# Patient Record
Sex: MALE | Race: WHITE | NOT HISPANIC OR LATINO | Employment: FULL TIME | URBAN - METROPOLITAN AREA
[De-identification: names, ages, dates, MRNs, and addresses within clinical notes are randomized per-mention and may not be internally consistent; named-entity substitution may affect disease eponyms.]

---

## 2017-03-10 ENCOUNTER — TELEPHONE (OUTPATIENT)
Dept: FAMILY MEDICINE | Facility: CLINIC | Age: 47
End: 2017-03-10

## 2017-03-10 NOTE — TELEPHONE ENCOUNTER
Patient called asking to have order for 3 cpap masks signed by PCP.  We do not sign orders for CPAP machines/supplies.  Will forward the fax to Sleep Medicine to address.      Shraddha WILLIS    [facility]

## 2017-04-25 DIAGNOSIS — E78.5 HYPERLIPIDEMIA LDL GOAL <160: ICD-10-CM

## 2017-04-25 NOTE — TELEPHONE ENCOUNTER
simvastatin (ZOCOR) 20 MG     Last Written Prescription Date: 4/25/16  Last Fill Quantity: 90, # refills: 3  Last Office Visit with G, P or [facility] prescribing provider: 4/20/16       Lab Results   Component Value Date    CHOL 160 04/20/2016     Lab Results   Component Value Date    HDL 40 04/20/2016     Lab Results   Component Value Date    LDL 77 04/20/2016     Lab Results   Component Value Date    TRIG 216 04/20/2016     Lab Results   Component Value Date    CHOLHDLRATIO 3.1 03/09/2015

## 2017-04-26 RX ORDER — SIMVASTATIN 20 MG
TABLET ORAL
Qty: 30 TABLET | Refills: 1 | Status: SHIPPED | OUTPATIENT
Start: 2017-04-26 | End: 2020-10-19

## 2017-04-26 NOTE — TELEPHONE ENCOUNTER
Medication is being filled for 1 time refill only due to:  Patient needs to be seen because it has been more than one year since last visit.   Due for annual visit and fasting labs. Please call patient to schedule this.   Rosa Moon RN  Triage Nurse

## 2017-04-28 NOTE — TELEPHONE ENCOUNTER
Called patient.  Informed him of refill and that he needed to come in for a med check and labs.  He stated he will call back to schedule.    Shraddha WILLIS    [facility]

## 2020-02-23 ENCOUNTER — HEALTH MAINTENANCE LETTER (OUTPATIENT)
Age: 50
End: 2020-02-23

## 2020-06-24 ENCOUNTER — TELEPHONE (OUTPATIENT)
Dept: FAMILY MEDICINE | Facility: CLINIC | Age: 50
End: 2020-06-24

## 2020-06-25 NOTE — TELEPHONE ENCOUNTER
Reason for call:  Other   Patient called regarding (reason for call): call back  Additional comments: Patient's Spouse called, the family would like to know if they can return to get care with Venecia Carpenter.     Phone number to reach patient:  Home number on file [phone] (home)     Best Time:  Any     Can we leave a detailed message on this number?  YES     Travel screening: Not Applicable

## 2020-06-25 NOTE — TELEPHONE ENCOUNTER
LVM to Florina, mother to advise that Venecia NOEL is leaving [facility] as of 06/26/20.    Can offer Kyler MACKEY, Faby Allen NP or Padmini MACKEY.

## 2020-10-19 DIAGNOSIS — F41.9 ANXIETY: Primary | ICD-10-CM

## 2020-10-19 DIAGNOSIS — E78.2 MIXED HYPERLIPIDEMIA: ICD-10-CM

## 2020-10-19 NOTE — TELEPHONE ENCOUNTER
Patient is switching from [facility]     There are no more refill remaining. Only has two day left. Wondering if we could get a new script at least enough till his appointment.  Our Pharmacy can not give him any, since he hasn't filled with us.     Kenzie Santos, [facility]  [address] [place] [address] [phone]

## 2020-10-19 NOTE — TELEPHONE ENCOUNTER
simvastatin (ZOCOR) 20 MG tablet   lexapro   Last Written Prescription Date:  4/26/17  Last Fill Quantity: 30,   # refills: 1  Last Office Visit: 4/25/17 Patient is transferring care from [facility] and is asking for a fill to get him to his scheduled visit  Future Office visit:    Next 5 appointments (look out 90 days)    Nov 02, 2020  3:40 PM  PHYSICAL with Padmini Thompson PA-C  [facility] ([address] [address] [phone]           Routing refill request to provider for review/approval because:  Medication is historical    Ita Grajeda RN on 10/19/2020 at 1:38 PM

## 2020-10-20 ENCOUNTER — TELEPHONE (OUTPATIENT)
Dept: FAMILY MEDICINE | Facility: CLINIC | Age: 50
End: 2020-10-20

## 2020-10-20 RX ORDER — SIMVASTATIN 20 MG
TABLET ORAL
Qty: 30 TABLET | Refills: 0 | Status: SHIPPED | OUTPATIENT
Start: 2020-10-20 | End: 2020-11-04

## 2020-10-20 RX ORDER — ESCITALOPRAM OXALATE 10 MG/1
10 TABLET ORAL DAILY
Qty: 30 TABLET | Refills: 0 | Status: SHIPPED | OUTPATIENT
Start: 2020-10-20 | End: 2020-11-02

## 2020-10-20 NOTE — TELEPHONE ENCOUNTER
Reason for call:  Medication   If this is a refill request, has the caller requested the refill from the pharmacy already? No  Will the patient be using a [facility]? Yes  Name of [facility] the [facility]    Name of the medication requested: simvastatin (ZOCOR)    Other request: Pt also requested RX for citalopram  Not listed under current med    Phone number to reach patient:  Cell number on file:    Telephone Information:   Mobile [phone]       Best Time:  Any time    Can we leave a detailed message on this number?  YES    Travel screening: Not Applicable     Naomi Guardado on 10/20/2020 at 1:39 PM

## 2020-10-20 NOTE — TELEPHONE ENCOUNTER
Pt last seen here in 2016.  Would need to go to previous provider for refills until seen here.  Has an appt scheduled 11/2/2020.      Called the pt to advise.

## 2020-11-02 ENCOUNTER — OFFICE VISIT (OUTPATIENT)
Dept: FAMILY MEDICINE | Facility: CLINIC | Age: 50
End: 2020-11-02
Payer: COMMERCIAL

## 2020-11-02 DIAGNOSIS — N50.811 RIGHT TESTICULAR PAIN: ICD-10-CM

## 2020-11-02 DIAGNOSIS — Z00.00 ROUTINE GENERAL MEDICAL EXAMINATION AT A HEALTH CARE FACILITY: Primary | ICD-10-CM

## 2020-11-02 DIAGNOSIS — R03.0 WHITE COAT SYNDROME WITHOUT DIAGNOSIS OF HYPERTENSION: ICD-10-CM

## 2020-11-02 DIAGNOSIS — E78.5 HYPERLIPIDEMIA LDL GOAL <160: ICD-10-CM

## 2020-11-02 DIAGNOSIS — G47.33 OSA ON CPAP: ICD-10-CM

## 2020-11-02 DIAGNOSIS — E66.01 MORBID OBESITY (H): ICD-10-CM

## 2020-11-02 DIAGNOSIS — F41.9 ANXIETY: ICD-10-CM

## 2020-11-02 LAB — HBA1C MFR BLD: 5.4 % (ref 0–5.6)

## 2020-11-02 PROCEDURE — 87389 HIV-1 AG W/HIV-1&-2 AB AG IA: CPT | Performed by: PHYSICIAN ASSISTANT

## 2020-11-02 PROCEDURE — 80061 LIPID PANEL: CPT | Performed by: PHYSICIAN ASSISTANT

## 2020-11-02 PROCEDURE — 86803 HEPATITIS C AB TEST: CPT | Performed by: PHYSICIAN ASSISTANT

## 2020-11-02 PROCEDURE — 36415 COLL VENOUS BLD VENIPUNCTURE: CPT | Performed by: PHYSICIAN ASSISTANT

## 2020-11-02 PROCEDURE — 99386 PREV VISIT NEW AGE 40-64: CPT | Performed by: PHYSICIAN ASSISTANT

## 2020-11-02 PROCEDURE — 84443 ASSAY THYROID STIM HORMONE: CPT | Performed by: PHYSICIAN ASSISTANT

## 2020-11-02 PROCEDURE — 80053 COMPREHEN METABOLIC PANEL: CPT | Performed by: PHYSICIAN ASSISTANT

## 2020-11-02 PROCEDURE — 83036 HEMOGLOBIN GLYCOSYLATED A1C: CPT | Performed by: PHYSICIAN ASSISTANT

## 2020-11-02 PROCEDURE — 99213 OFFICE O/P EST LOW 20 MIN: CPT | Mod: 25 | Performed by: PHYSICIAN ASSISTANT

## 2020-11-02 PROCEDURE — 96127 BRIEF EMOTIONAL/BEHAV ASSMT: CPT | Performed by: PHYSICIAN ASSISTANT

## 2020-11-02 RX ORDER — ESCITALOPRAM OXALATE 10 MG/1
10 TABLET ORAL DAILY
Qty: 90 TABLET | Refills: 3 | Status: SHIPPED | OUTPATIENT
Start: 2020-11-02 | End: 2021-11-16

## 2020-11-02 ASSESSMENT — ENCOUNTER SYMPTOMS
NAUSEA: 0
EYE PAIN: 0
COUGH: 0
ABDOMINAL PAIN: 0
WEAKNESS: 0
DYSURIA: 0
SHORTNESS OF BREATH: 0
PALPITATIONS: 0
CONSTIPATION: 0
FEVER: 0
ARTHRALGIAS: 0
FREQUENCY: 0
MYALGIAS: 0
DIARRHEA: 0
SORE THROAT: 0
JOINT SWELLING: 0
CHILLS: 0
HEADACHES: 0
NERVOUS/ANXIOUS: 0
PARESTHESIAS: 0
HEMATOCHEZIA: 0
DIZZINESS: 0
HEARTBURN: 0
HEMATURIA: 0

## 2020-11-02 ASSESSMENT — ANXIETY QUESTIONNAIRES
7. FEELING AFRAID AS IF SOMETHING AWFUL MIGHT HAPPEN: SEVERAL DAYS
IF YOU CHECKED OFF ANY PROBLEMS ON THIS QUESTIONNAIRE, HOW DIFFICULT HAVE THESE PROBLEMS MADE IT FOR YOU TO DO YOUR WORK, TAKE CARE OF THINGS AT HOME, OR GET ALONG WITH OTHER PEOPLE: NOT DIFFICULT AT ALL
GAD7 TOTAL SCORE: 6
2. NOT BEING ABLE TO STOP OR CONTROL WORRYING: SEVERAL DAYS
5. BEING SO RESTLESS THAT IT IS HARD TO SIT STILL: SEVERAL DAYS
1. FEELING NERVOUS, ANXIOUS, OR ON EDGE: SEVERAL DAYS
3. WORRYING TOO MUCH ABOUT DIFFERENT THINGS: SEVERAL DAYS
6. BECOMING EASILY ANNOYED OR IRRITABLE: NOT AT ALL

## 2020-11-02 ASSESSMENT — PATIENT HEALTH QUESTIONNAIRE - PHQ9
5. POOR APPETITE OR OVEREATING: SEVERAL DAYS
SUM OF ALL RESPONSES TO PHQ QUESTIONS 1-9: 0

## 2020-11-02 ASSESSMENT — MIFFLIN-ST. JEOR: SCORE: 1925.75

## 2020-11-02 NOTE — PROGRESS NOTES
SUBJECTIVE:   CC: Neptali Ospina is an 50 year old male who presents for preventative health visit.     Patient has been advised of split billing requirements and indicates understanding: Yes  Healthy Habits:     Getting at least 3 servings of Calcium per day:  Yes    Bi-annual eye exam:  Yes    Dental care twice a year:  Yes    Sleep apnea or symptoms of sleep apnea:  Sleep apnea    Diet:  Regular (no restrictions)    Frequency of exercise:  2-3 days/week    Duration of exercise:  45-60 minutes    Taking medications regularly:  Yes    Medication side effects:  None    PHQ-2 Total Score: 0    Additional concerns today:  Yes    History of elevated blood pressure readings without diagnosis of HTN. He feels significant anxiety coming to clinic and blood pressures are usually elevated. He did see cardiology in 2018 who felt he had white coat HTN due to anxiety. He monitors his BP at home and readings are variable, sometimes 130s/80s but sometimes higher. He feels that checking his blood pressure at home causes more anxiety and then blood pressure rises which increases anxiety more. BP last night was 135/88. BP was 146/104 at home today - he has been anxious all day anticipating this clinic visit this afternoon. No chest pain, shortness of breath, swelling in the extremities, palpitations, dizziness, or headaches. No blurred vision. He would like to try a 24 hour ambulatory blood pressure monitor to see if he really has high blood pressure or not. He is open to taking medication if needed but wants to know if he actually has high blood pressure or if it is just white coat hypertension or related to anxiety.    BP Readings from Last 4 Encounters:   11/02/20 146/88  09/24/19 150/92 (pulled from [facility])  10/31/18 164/92 (pulled from [facility])  09/06/18 132/92 (pulled from [facility])  03/13/18 188/96 (pulled from [facility])    History of anxiety. Taking Lexapro 10 mg daily with good control. No side  effects. No suicidal or homicidal ideation. Would like to continue medication without change.    PHQ 11/2/2020   PHQ-9 Total Score 0   Q9: Thoughts of better off dead/self-harm past 2 weeks Not at all     WALDO-7 SCORE 6/4/2013 6/4/2013 11/2/2020   Total Score 10 10 -   Total Score - - 6     Today's PHQ-2 Score:   PHQ-2 ( 1999 [employer]) 11/2/2020   Q1: Little interest or pleasure in doing things 0   Q2: Feeling down, depressed or hopeless 0   PHQ-2 Score 0   Q1: Little interest or pleasure in doing things Not at all   Q2: Feeling down, depressed or hopeless Not at all   PHQ-2 Score 0     Reports having occasional mild right testicular pain after working out. No bulges or lumps. No abdominal pain, nausea, vomiting, bowel or urinary changes. His wife is concerned maybe he has a hernia but he is not concerned about this and does not want work up at this time. States he worked out today and has no pain or discomfort.    Abuse: Current or Past(Physical, Sexual or Emotional)- No  Do you feel safe in your environment? Yes        Social History     Tobacco Use     Smoking status: Never Smoker     Smokeless tobacco: Never Used   Substance Use Topics     Alcohol use: Yes     Alcohol/week: 0.0 standard drinks     Comment: very occ.         Alcohol Use 11/2/2020   Prescreen: >3 drinks/day or >7 drinks/week? No   Prescreen: >3 drinks/day or >7 drinks/week? -       Last PSA: No results found for: PSA    Reviewed orders with patient. Reviewed health maintenance and updated orders accordingly - Yes  Lab work is in process  Labs reviewed in EPIC  BP Readings from Last 3 Encounters:   11/02/20 (!) 146/88   04/20/16 162/74   03/09/15 134/86    Wt Readings from Last 3 Encounters:   11/02/20 108.2 kg (238 lb 9.6 oz)   04/20/16 95.3 kg (210 lb 3.2 oz)   03/09/15 93.4 kg (206 lb)                  Patient Active Problem List   Diagnosis     Obesity     DANIELLE on CPAP     Vitamin D deficiency     Left thyroid nodule     Anxiety     White coat  syndrome without diagnosis of hypertension     CARDIOVASCULAR SCREENING; LDL GOAL LESS THAN 160     Morbid obesity (H)     Past Surgical History:   Procedure Laterality Date     PE TUBES       TONSILLECTOMY & ADENOIDECTOMY       VASECTOMY         Social History     Tobacco Use     Smoking status: Never Smoker     Smokeless tobacco: Never Used   Substance Use Topics     Alcohol use: Yes     Alcohol/week: 0.0 standard drinks     Comment: very occ.     Family History   Problem Relation Age of Onset     Circulatory Father         sleep apnea,   in sleep 58     C.A.D. Mother      Cancer Mother         brain tumor     Gynecology Sister         x2         Current Outpatient Medications   Medication Sig Dispense Refill     aspirin 81 MG tablet Take 1 tablet by mouth daily. 90 tablet 3     escitalopram (LEXAPRO) 10 MG tablet Take 1 tablet (10 mg) by mouth daily 90 tablet 3     fish oil-omega-3 fatty acids (OMEGA 3) 1000 MG capsule Take 1 capsule by mouth 2 times daily. Lavazo fish oil 180 capsule 1     fluticasone (FLONASE) 50 MCG/ACT nasal spray Spray 1-2 sprays into both nostrils daily 1 Package 1     Multiple Vitamin (MULTI-VITAMIN) per tablet Take 1 tablet by mouth daily. 90 tablet 3     simvastatin (ZOCOR) 20 MG tablet TAKE ONE TABLET BY MOUTH EVERY NIGHT AT BEDTIME 30 tablet 0     Allergies   Allergen Reactions     Nkda [No Known Drug Allergies]      Recent Labs   Lab Test 20  1628 16  0821 03/09/15  0836 13  0946 13  1005   A1C 5.4 5.5  --  5.3  --    LDL  --  77 66  --  106   HDL  --  40 42  --  36*   TRIG  --  216* 120  --  141   ALT  --  57  --   --   --    CR  --  0.92  --   --   --    GFRESTIMATED  --  89  --   --   --    GFRESTBLACK  --  >90   GFR Calc    --   --   --    POTASSIUM  --  3.5  --   --   --    TSH  --  2.34 2.54  --   --         Reviewed and updated as needed this visit by clinical staff  Tobacco  Allergies  Meds  Problems  Med Hx  Surg Hx  Fam Hx   "Soc Hx          Reviewed and updated as needed this visit by Provider  Tobacco  Allergies  Meds  Problems  Med Hx  Surg Hx  Fam Hx             Review of Systems   Constitutional: Negative for chills and fever.   HENT: Negative for congestion, ear pain, hearing loss and sore throat.    Eyes: Negative for pain and visual disturbance.   Respiratory: Negative for cough and shortness of breath.    Cardiovascular: Negative for chest pain, palpitations and peripheral edema.   Gastrointestinal: Negative for abdominal pain, constipation, diarrhea, heartburn, hematochezia, nausea and vomiting.   Genitourinary: Negative for discharge, dysuria, frequency, genital sores, hematuria, impotence and urgency.   Musculoskeletal: Negative for arthralgias, joint swelling and myalgias.   Skin: Negative for rash.   Neurological: Negative for dizziness, weakness, headaches and paresthesias.   Psychiatric/Behavioral: Negative for mood changes. The patient is not nervous/anxious.        OBJECTIVE:   BP (!) 146/88   Pulse 96   Temp 98  F (36.7  C) (Oral)   Resp 16   Ht 1.734 m (5' 8.25\")   Wt 108.2 kg (238 lb 9.6 oz)   SpO2 99%   BMI 36.01 kg/m      Physical Exam  GENERAL: healthy, alert and no distress  EYES: Eyes grossly normal to inspection, PERRL and conjunctivae and sclerae normal  HENT: ear canals and TM's normal, nose and mouth without ulcers or lesions  NECK: no adenopathy, no asymmetry, masses, or scars and thyroid normal to palpation  RESP: lungs clear to auscultation - no rales, rhonchi or wheezes  CV: regular rate and rhythm, normal S1 S2, no S3 or S4, no murmur, click or rub, no peripheral edema and peripheral pulses strong  ABDOMEN: soft, nontender, no hepatosplenomegaly, no masses and bowel sounds normal   (male): normal male genitalia without lesions or urethral discharge, no hernia  MS: no gross musculoskeletal defects noted, no edema  SKIN: no suspicious lesions or rashes. Several small skin tags under right " axilla. Seborrheic keratosis on abdomen and back.  NEURO: Normal strength and tone, mentation intact and speech normal  PSYCH: mentation appears normal, affect normal/bright, anxious initially but calmed down towards end of visit    Diagnostic Test Results:  Labs reviewed in Epic    ASSESSMENT/PLAN:   1. Routine general medical examination at a health care facility  Reviewed personal and family history. Reviewed age appropriate screenings. Recommended any needed vaccinations. Continue to focus on well balanced diet and exercise.   - Comprehensive metabolic panel (BMP + Alb, Alk Phos, ALT, AST, Total. Bili, TP)  - Lipid panel reflex to direct LDL Fasting  - Hemoglobin A1c  - HIV Antigen Antibody Combo  - **Hepatitis C Screen Reflex to RNA FUTURE anytime  - TSH with Free T4 Reflex    2. Hyperlipidemia LDL goal <160  Chronic issue. Taking Simvastatin 20 mg without side effects. Will check labs, refill meds.  - Lipid panel reflex to direct LDL Fasting    3. DANIELLE on CPAP  Chronic issue, doing well with CPAP.    4. White coat syndrome without diagnosis of hypertension  History of elevated blood pressure readings without diagnosis of HTN. He did see cardiology in 2018 who felt he had white coat HTN due to anxiety (significant anxiety coming to clinic). He monitors his BP at home and readings are variable, sometimes 130s/80s but sometimes higher - seems the more he checks his BP the more anxious he gets and then his BP gets higher. BP in clinic today checked several times throughout the visit and did come down quite a bit on recheck after he was feeling less anxious, though still elevated at 146/88. He would like to try a 24 hour ambulatory blood pressure monitor to see if he really has high blood pressure or not. He is open to taking medication if needed but wants to know if he actually has high blood pressure or if it is just white coat hypertension or related to anxiety.  - 24 Hour Blood Pressure Monitor - Adult;  "Future    5. Morbid obesity (H)  Chronic issue. Discussed healthy diet/exercise, weight loss.    6. Anxiety  Chronic issue, stable. PHQ-9/WALDO-7 updated. Tolerating medication without side effects. Continue present management.  - escitalopram (LEXAPRO) 10 MG tablet; Take 1 tablet (10 mg) by mouth daily  Dispense: 90 tablet; Refill: 3    7. Right testicular pain  Intermittent mild discomfort after working out. No bulging or lumps. No current pain. No evidence of hernia on exam. Discussed options for work up including testicular US but he does not want to pursue work up at this time. Recommend monitoring and follow-up if persistent or worsening.    Patient has been advised of split billing requirements and indicates understanding: Yes  COUNSELING:   Reviewed preventive health counseling, as reflected in patient instructions    Estimated body mass index is 36.01 kg/m  as calculated from the following:    Height as of this encounter: 1.734 m (5' 8.25\").    Weight as of this encounter: 108.2 kg (238 lb 9.6 oz).     Weight management plan: Discussed healthy diet and exercise guidelines    He reports that he has never smoked. He has never used smokeless tobacco.      Counseling Resources:  ATP IV Guidelines  Pooled Cohorts Equation Calculator  FRAX Risk Assessment  ICSI Preventive Guidelines  Dietary Guidelines for Americans, 2010  USDA's MyPlate  ASA Prophylaxis  Lung CA Screening    [facility]  "

## 2020-11-03 VITALS
BODY MASS INDEX: 36.16 KG/M2 | TEMPERATURE: 98 F | WEIGHT: 238.6 LBS | SYSTOLIC BLOOD PRESSURE: 146 MMHG | OXYGEN SATURATION: 99 % | RESPIRATION RATE: 16 BRPM | DIASTOLIC BLOOD PRESSURE: 88 MMHG | HEIGHT: 68 IN | HEART RATE: 96 BPM

## 2020-11-03 LAB
ALBUMIN SERPL-MCNC: 4.2 G/DL (ref 3.4–5)
ALP SERPL-CCNC: 63 U/L (ref 40–150)
ALT SERPL W P-5'-P-CCNC: 84 U/L (ref 0–70)
ANION GAP SERPL CALCULATED.3IONS-SCNC: 4 MMOL/L (ref 3–14)
AST SERPL W P-5'-P-CCNC: 45 U/L (ref 0–45)
BILIRUB SERPL-MCNC: 0.6 MG/DL (ref 0.2–1.3)
BUN SERPL-MCNC: 13 MG/DL (ref 7–30)
CALCIUM SERPL-MCNC: 9.3 MG/DL (ref 8.5–10.1)
CHLORIDE SERPL-SCNC: 103 MMOL/L (ref 94–109)
CHOLEST SERPL-MCNC: 180 MG/DL
CO2 SERPL-SCNC: 31 MMOL/L (ref 20–32)
CREAT SERPL-MCNC: 0.87 MG/DL (ref 0.66–1.25)
GFR SERPL CREATININE-BSD FRML MDRD: >90 ML/MIN/{1.73_M2}
GLUCOSE SERPL-MCNC: 99 MG/DL (ref 70–99)
HDLC SERPL-MCNC: 37 MG/DL
LDLC SERPL CALC-MCNC: 96 MG/DL
NONHDLC SERPL-MCNC: 143 MG/DL
POTASSIUM SERPL-SCNC: 4.1 MMOL/L (ref 3.4–5.3)
PROT SERPL-MCNC: 7.9 G/DL (ref 6.8–8.8)
SODIUM SERPL-SCNC: 138 MMOL/L (ref 133–144)
TRIGL SERPL-MCNC: 235 MG/DL
TSH SERPL DL<=0.005 MIU/L-ACNC: 2.7 MU/L (ref 0.4–4)

## 2020-11-03 ASSESSMENT — ANXIETY QUESTIONNAIRES: GAD7 TOTAL SCORE: 6

## 2020-11-03 ASSESSMENT — ENCOUNTER SYMPTOMS: VOMITING: 0

## 2020-11-04 DIAGNOSIS — E78.2 MIXED HYPERLIPIDEMIA: ICD-10-CM

## 2020-11-04 DIAGNOSIS — R74.01 ELEVATED ALT MEASUREMENT: Primary | ICD-10-CM

## 2020-11-04 LAB
HCV AB SERPL QL IA: NONREACTIVE
HIV 1+2 AB+HIV1 P24 AG SERPL QL IA: NONREACTIVE

## 2020-11-04 RX ORDER — SIMVASTATIN 20 MG
TABLET ORAL
Qty: 90 TABLET | Refills: 3 | Status: SHIPPED | OUTPATIENT
Start: 2020-11-04 | End: 2021-11-16

## 2021-03-12 ENCOUNTER — OFFICE VISIT (OUTPATIENT)
Dept: PODIATRY | Facility: CLINIC | Age: 51
End: 2021-03-12
Payer: COMMERCIAL

## 2021-03-12 VITALS
WEIGHT: 240 LBS | DIASTOLIC BLOOD PRESSURE: 78 MMHG | HEIGHT: 68 IN | BODY MASS INDEX: 36.37 KG/M2 | SYSTOLIC BLOOD PRESSURE: 128 MMHG

## 2021-03-12 DIAGNOSIS — L60.0 INGROWN NAIL OF GREAT TOE OF LEFT FOOT: ICD-10-CM

## 2021-03-12 DIAGNOSIS — M79.672 LEFT FOOT PAIN: Primary | ICD-10-CM

## 2021-03-12 PROCEDURE — 99202 OFFICE O/P NEW SF 15 MIN: CPT | Mod: 25 | Performed by: PODIATRIST

## 2021-03-12 PROCEDURE — 11730 AVULSION NAIL PLATE SIMPLE 1: CPT | Mod: TA | Performed by: PODIATRIST

## 2021-03-12 ASSESSMENT — MIFFLIN-ST. JEOR: SCORE: 1932.1

## 2021-03-12 ASSESSMENT — PAIN SCALES - GENERAL: PAINLEVEL: MODERATE PAIN (5)

## 2021-03-12 NOTE — LETTER
3/12/2021         RE: Neptali Ospina  [address] [address]        Dear Colleague,    Thank you for referring your patient, Neptali Ospina, to the [facility]. Please see a copy of my visit note below.    PATIENT HISTORY:  Neptali Ospina is a 50 year old male who presents to clinic for pain to the left great toenail.  Notes is been going on for about a month.  Pain can be 5 out of 10 at its worst.  Worse with the sheets on it or with pressure.  Denies specific injury.  He thinks he cut his nail wrong as he is never had an ingrown nail before.  Wondering what can be done for it.    Review of Systems:  Patient denies fever, chills, rash, wound, stiffness, limping, numbness, weakness, heart burn, blood in stool, chest pain with activity, calf pain when walking, shortness of breath with activity, chronic cough, easy bleeding/bruising, swelling of ankles, excessive thirst, fatigue, depression, anxiety.       PAST MEDICAL HISTORY:   Past Medical History:   Diagnosis Date     Anxiety      Elevated fasting glucose      High triglycerides      DARLING (nonalcoholic steatohepatitis)     12/10      DANIELLE on CPAP      Strabismus      Thyroid nodule 2/2012    biopsy thyroglossal ductal cyst.  see Endo     Vitamin D deficiency         PAST SURGICAL HISTORY:   Past Surgical History:   Procedure Laterality Date     PE TUBES       TONSILLECTOMY & ADENOIDECTOMY       VASECTOMY          MEDICATIONS:   Current Outpatient Medications:      aspirin 81 MG tablet, Take 1 tablet by mouth daily., Disp: 90 tablet, Rfl: 3     escitalopram (LEXAPRO) 10 MG tablet, Take 1 tablet (10 mg) by mouth daily, Disp: 90 tablet, Rfl: 3     fish oil-omega-3 fatty acids (OMEGA 3) 1000 MG capsule, Take 1 capsule by mouth 2 times daily. Lavazo fish oil, Disp: 180 capsule, Rfl: 1     fluticasone (FLONASE) 50 MCG/ACT nasal spray, Spray 1-2 sprays into both nostrils daily, Disp: 1 Package, Rfl: 1      Multiple Vitamin (MULTI-VITAMIN) per tablet, Take 1 tablet by mouth daily., Disp: 90 tablet, Rfl: 3     simvastatin (ZOCOR) 20 MG tablet, TAKE ONE TABLET BY MOUTH EVERY NIGHT AT BEDTIME, Disp: 90 tablet, Rfl: 3     ALLERGIES:    Allergies   Allergen Reactions     Nkda [No Known Drug Allergies]         SOCIAL HISTORY:   Social History     Socioeconomic History     Marital status:      Spouse name: Not on file     Number of children: 4     Years of education: Not on file     Highest education level: Not on file   Occupational History     Not on file   Social Needs     Financial resource strain: Not on file     Food insecurity     Worry: Not on file     Inability: Not on file     Transportation needs     Medical: Not on file     Non-medical: Not on file   Tobacco Use     Smoking status: Never Smoker     Smokeless tobacco: Never Used   Substance and Sexual Activity     Alcohol use: Yes     Alcohol/week: 0.0 standard drinks     Comment: very occ.     Drug use: No     Sexual activity: Yes     Partners: Female     Birth control/protection: Surgical   Lifestyle     Physical activity     Days per week: Not on file     Minutes per session: Not on file     Stress: Not on file   Relationships     Social connections     Talks on phone: Not on file     Gets together: Not on file     Attends Restoration service: Not on file     Active member of club or organization: Not on file     Attends meetings of clubs or organizations: Not on file     Relationship status: Not on file     Intimate partner violence     Fear of current or ex partner: Not on file     Emotionally abused: Not on file     Physically abused: Not on file     Forced sexual activity: Not on file   Other Topics Concern     Parent/sibling w/ CABG, MI or angioplasty before 65F 55M? Yes   Social History Narrative     Not on file        FAMILY HISTORY:   Family History   Problem Relation Age of Onset     Circulatory Father         sleep apnea,   in sleep 58      "CONSUELOABRANDT Mother      Cancer Mother         brain tumor     Gynecology Sister         x2        EXAM:Vitals: /78   Ht 1.734 m (5' 8.25\")   Wt 108.9 kg (240 lb)   BMI 36.23 kg/m    BMI= Body mass index is 36.23 kg/m .    General appearance: Patient is alert and fully cooperative with history & exam.  No sign of distress is noted during the visit.     Psychiatric: Affect is pleasant & appropriate.  Patient appears motivated to improve health.     Respiratory: Breathing is regular & unlabored while sitting.     HEENT: Hearing is intact to spoken word.  Speech is clear.  No gross evidence of visual impairment that would impact ambulation.     Dermatologic: lateral border of the left great toenail is incurvated.  Localized redness and pain on palpation     Vascular: DP & PT pulses are intact & regular bilaterally.  No significant edema or varicosities noted.  CFT and skin temperature is normal to both lower extremities.     Neurologic: Lower extremity sensation is intact to light touch.  No evidence of weakness or contracture in the lower extremities.  No evidence of neuropathy.     Musculoskeletal: Patient is ambulatory without assistive device or brace.  No gross ankle deformity noted.  No foot or ankle joint effusion is noted.     ASSESSMENT:    Left foot pain  Ingrown nail of great toe of left foot     Medical Decision Making/Plan:  Reviewed patient's chart in [facility]. The potential causes and nature of an ingrown toenail were discussed with the patient.  We reviewed the natural history/prognosis of the condition and potential risks if no treatment is provided.      Treatment options discussed included conservative management (oral antibiotics, soaking of foot, adequate width shoes)  as well as surgical management (partial or total nail removal).  The pros and cons of both forms of treatment were reviewed.      After thorough discussion and answering all questions, the patient elected to have the border removed.  " He will soak the foot twice a day for 2 weeks and apply antibiotic cream and a Band-Aid.  All questions were answered to patient satisfaction he will call further questions or concerns.     Procedure:  After verbal consent, the left big toe was anesthetized with 5cc's of 1% lidocaine plain. A tourniquet was applied to the toe. The medial border was then raised from the nail bed and then cut the length of the nail.  The offending nail border was then removed.  T Bacitracin was applied to the nail bed.  The tourniquet was removed.  Bandage was applied to the toe.  The patient tolerated the procedure and anesthesia well.    .    Patient risk factor: Patient is at low risk for infection.        Thalia Prado DPM, Podiatry/Foot and Ankle Surgery    Recommended to Neptali Ospina to follow up with Primary Care provider regarding elevated blood pressure.          Again, thank you for allowing me to participate in the care of your patient.        Sincerely,        Thalia Prado DPM, Podiatry/Foot and Ankle Surgery

## 2021-03-12 NOTE — PATIENT INSTRUCTIONS
Thank you for choosing [facility] Podiatry / Foot & Ankle Surgery!    DR. TAVARES'S CLINIC:  [facility] SCHEDULE SURGERY: [phone]   [address] BILLING QUESTIONS: [phone]   [place] [address] APPOINTMENTS: [phone]   PH: [phone] CONSUMER PRICE LINE:[phone]   FAX: [phone]      Follow up: as needed  Diagnosis: Left ingrown great toe nail     INGROWN TOENAILS  When a toenail is ingrown, it is curved and grows into the skin, usually at the nail borders (the sides of the nail). This  digging in  of the nail irritates the skin, often creating pain, redness, swelling, and warmth in the toe.  If an ingrown nail causes a break in the skin, bacteria may enter and cause an infection in the area, which is often marked by drainage and a foul odor. However, even if the toe isn t painful, red, swollen, or warm, a nail that curves downward into the skin can progress to an infection.  CAUSES:  Heredity: In many people, the tendency for ingrown toenails is inherited.   Trauma: Sometimes an ingrown toenail is the result of trauma, such as stubbing your toe, having an object fall on your toe, or engaging in activities that involve repeated pressure on the toes, such as kicking or running.   Improper Trimming:  The most common cause of ingrown toenails is cutting your nails too short. This encourages the skin next to the nail to fold over the nail.   Improperly Sized Footwear: Ingrown toenails can result from wearing socks and shoes that are tight or short.   Nail Conditions: Ingrown toenails can be caused by nail problems, such as fungal infections or losing a nail due to trauma.   TREATMENT: Sometimes initial treatment for ingrown toenails can be safely performed at home. However, home treatment is strongly discouraged if an infection is suspected, or for those who have medical conditions that put feet at high risk, such as diabetes, nerve damage in the foot, or poor  circulation.  Home care: If you don t have an infection or any of the above medical conditions, you can soak your foot in room-temperature water (adding Epsom s salt may be recommended by your doctor), and gently massage the side of the nail fold to help reduce the inflammation.  Avoid attempting  bathroom surgery.  Repeated cutting of the nail can cause the condition to worsen over time. If your symptoms fail to improve, it s time to see a foot and ankle surgeon.  Physician care: After examining the toe, the foot and ankle surgeon will select the treatment best suited for you. If an infection is present, an oral antibiotic may be prescribed.  Sometimes a minor surgical procedure, often performed in the office, will ease the pain and remove the offending nail. After applying a local anesthetic, the doctor removes part of the nail s side border. Some nails may become ingrown again, requiring removal of the nail root.  Following the nail procedure, a light bandage will be applied. Most people experience very little pain after surgery and may resume normal activity the next day. If your surgeon has prescribed an oral antibiotic, be sure to take all the medication, even if your symptoms have improved.  PREVENTION:  Proper Trimming: Cut toenails in a fairly straight line, and don t cut them too short. You should be able to get your fingernail under the sides and end of the nail.   Well-fitting Footwear: Don t wear shoes that are short or tight in the toe area. Avoid shoes that are loose, because they too cause pressure on the toes, especially when running or walking briskly.     INGROWN TOENAIL REMOVAL AFTERCARE     Go directly home and elevate the affected foot on one or two pillows for the remainder of the day/evening if possible. Your toe may stay numb anywhere from 2-8 hours.     Take Tylenol, ibuprofen or another anti-inflammatory as needed for pain.     Take antibiotic if that has been prescribed. Finish the entire  prescribed antibiotic even if your symptoms have improved.     The evening of the procedure, soak/wash the affected area in warm water (you may add Epsom salt) for 5 to 10 minutes. Do this twice a day for 2-4 weeks (6-8 weeks if you had phenol) (you may count showering/bathing as one soak).  After soaks, pat the area dry and then allow to airdry for a few minutes. Apply antibiotic ointment to the area and cover with 2 X 2 gauze and paper tape or band-aid.    You may pursue everyday activities as tolerated with either an open toe shoe or cut-out shoe as needed or you may wear regular shoes if no pain is noted.    Watch for any signs and symptoms of infection such as: redness, red streaks going up the foot/leg, swelling, pus or foul odor. Those that have had the phenol procedure, the toe will drain longer and will look like it is infected because it is a chemical burn.     Please call with questions.          Neptali to follow up with Primary Care provider regarding elevated blood pressure. (if equal or above 140/90)

## 2021-03-12 NOTE — PROGRESS NOTES
PATIENT HISTORY:  Neptali Ospina is a 50 year old male who presents to clinic for pain to the left great toenail.  Notes is been going on for about a month.  Pain can be 5 out of 10 at its worst.  Worse with the sheets on it or with pressure.  Denies specific injury.  He thinks he cut his nail wrong as he is never had an ingrown nail before.  Wondering what can be done for it.    Review of Systems:  Patient denies fever, chills, rash, wound, stiffness, limping, numbness, weakness, heart burn, blood in stool, chest pain with activity, calf pain when walking, shortness of breath with activity, chronic cough, easy bleeding/bruising, swelling of ankles, excessive thirst, fatigue, depression, anxiety.       PAST MEDICAL HISTORY:   Past Medical History:   Diagnosis Date     Anxiety      Elevated fasting glucose      High triglycerides      DARLING (nonalcoholic steatohepatitis)     12/10      DANIELLE on CPAP      Strabismus      Thyroid nodule 2/2012    biopsy thyroglossal ductal cyst.  see Endo     Vitamin D deficiency         PAST SURGICAL HISTORY:   Past Surgical History:   Procedure Laterality Date     PE TUBES       TONSILLECTOMY & ADENOIDECTOMY       VASECTOMY          MEDICATIONS:   Current Outpatient Medications:      aspirin 81 MG tablet, Take 1 tablet by mouth daily., Disp: 90 tablet, Rfl: 3     escitalopram (LEXAPRO) 10 MG tablet, Take 1 tablet (10 mg) by mouth daily, Disp: 90 tablet, Rfl: 3     fish oil-omega-3 fatty acids (OMEGA 3) 1000 MG capsule, Take 1 capsule by mouth 2 times daily. Lavazo fish oil, Disp: 180 capsule, Rfl: 1     fluticasone (FLONASE) 50 MCG/ACT nasal spray, Spray 1-2 sprays into both nostrils daily, Disp: 1 Package, Rfl: 1     Multiple Vitamin (MULTI-VITAMIN) per tablet, Take 1 tablet by mouth daily., Disp: 90 tablet, Rfl: 3     simvastatin (ZOCOR) 20 MG tablet, TAKE ONE TABLET BY MOUTH EVERY NIGHT AT BEDTIME, Disp: 90 tablet, Rfl: 3     ALLERGIES:    Allergies   Allergen Reactions     Nkda  "[No Known Drug Allergies]         SOCIAL HISTORY:   Social History     Socioeconomic History     Marital status:      Spouse name: Not on file     Number of children: 4     Years of education: Not on file     Highest education level: Not on file   Occupational History     Not on file   Social Needs     Financial resource strain: Not on file     Food insecurity     Worry: Not on file     Inability: Not on file     Transportation needs     Medical: Not on file     Non-medical: Not on file   Tobacco Use     Smoking status: Never Smoker     Smokeless tobacco: Never Used   Substance and Sexual Activity     Alcohol use: Yes     Alcohol/week: 0.0 standard drinks     Comment: very occ.     Drug use: No     Sexual activity: Yes     Partners: Female     Birth control/protection: Surgical   Lifestyle     Physical activity     Days per week: Not on file     Minutes per session: Not on file     Stress: Not on file   Relationships     Social connections     Talks on phone: Not on file     Gets together: Not on file     Attends Mormonism service: Not on file     Active member of club or organization: Not on file     Attends meetings of clubs or organizations: Not on file     Relationship status: Not on file     Intimate partner violence     Fear of current or ex partner: Not on file     Emotionally abused: Not on file     Physically abused: Not on file     Forced sexual activity: Not on file   Other Topics Concern     Parent/sibling w/ CABG, MI or angioplasty before 65F 55M? Yes   Social History Narrative     Not on file        FAMILY HISTORY:   Family History   Problem Relation Age of Onset     Circulatory Father         sleep apnea,   in sleep 58     C.A.D. Mother      Cancer Mother         brain tumor     Gynecology Sister         x2        EXAM:Vitals: /78   Ht 1.734 m (5' 8.25\")   Wt 108.9 kg (240 lb)   BMI 36.23 kg/m    BMI= Body mass index is 36.23 kg/m .    General appearance: Patient is alert and fully " cooperative with history & exam.  No sign of distress is noted during the visit.     Psychiatric: Affect is pleasant & appropriate.  Patient appears motivated to improve health.     Respiratory: Breathing is regular & unlabored while sitting.     HEENT: Hearing is intact to spoken word.  Speech is clear.  No gross evidence of visual impairment that would impact ambulation.     Dermatologic: lateral border of the left great toenail is incurvated.  Localized redness and pain on palpation     Vascular: DP & PT pulses are intact & regular bilaterally.  No significant edema or varicosities noted.  CFT and skin temperature is normal to both lower extremities.     Neurologic: Lower extremity sensation is intact to light touch.  No evidence of weakness or contracture in the lower extremities.  No evidence of neuropathy.     Musculoskeletal: Patient is ambulatory without assistive device or brace.  No gross ankle deformity noted.  No foot or ankle joint effusion is noted.     ASSESSMENT:    Left foot pain  Ingrown nail of great toe of left foot     Medical Decision Making/Plan:  Reviewed patient's chart in [facility]. The potential causes and nature of an ingrown toenail were discussed with the patient.  We reviewed the natural history/prognosis of the condition and potential risks if no treatment is provided.      Treatment options discussed included conservative management (oral antibiotics, soaking of foot, adequate width shoes)  as well as surgical management (partial or total nail removal).  The pros and cons of both forms of treatment were reviewed.      After thorough discussion and answering all questions, the patient elected to have the border removed.  He will soak the foot twice a day for 2 weeks and apply antibiotic cream and a Band-Aid.  All questions were answered to patient satisfaction he will call further questions or concerns.     Procedure:  After verbal consent, the left big toe was anesthetized with 5cc's of  1% lidocaine plain. A tourniquet was applied to the toe. The medial border was then raised from the nail bed and then cut the length of the nail.  The offending nail border was then removed.  T Bacitracin was applied to the nail bed.  The tourniquet was removed.  Bandage was applied to the toe.  The patient tolerated the procedure and anesthesia well.    .    Patient risk factor: Patient is at low risk for infection.        Thalia Prado DPM, Podiatry/Foot and Ankle Surgery    Recommended to Neptali Ospina to follow up with Primary Care provider regarding elevated blood pressure.

## 2021-03-23 ENCOUNTER — IMMUNIZATION (OUTPATIENT)
Dept: NURSING | Facility: CLINIC | Age: 51
End: 2021-03-23
Payer: COMMERCIAL

## 2021-03-23 PROCEDURE — 0001A PR COVID VAC PFIZER DIL RECON 30 MCG/0.3 ML IM: CPT

## 2021-03-23 PROCEDURE — 91300 PR COVID VAC PFIZER DIL RECON 30 MCG/0.3 ML IM: CPT

## 2021-04-13 ENCOUNTER — IMMUNIZATION (OUTPATIENT)
Dept: NURSING | Facility: CLINIC | Age: 51
End: 2021-04-13
Attending: INTERNAL MEDICINE
Payer: COMMERCIAL

## 2021-04-13 PROCEDURE — 0002A PR COVID VAC PFIZER DIL RECON 30 MCG/0.3 ML IM: CPT

## 2021-04-13 PROCEDURE — 91300 PR COVID VAC PFIZER DIL RECON 30 MCG/0.3 ML IM: CPT

## 2021-09-23 ENCOUNTER — OFFICE VISIT (OUTPATIENT)
Dept: DERMATOLOGY | Facility: CLINIC | Age: 51
End: 2021-09-23
Payer: COMMERCIAL

## 2021-09-23 VITALS — BODY MASS INDEX: 36.23 KG/M2 | WEIGHT: 240 LBS

## 2021-09-23 DIAGNOSIS — D22.9 NEVUS: ICD-10-CM

## 2021-09-23 DIAGNOSIS — L91.8 SKIN TAG: Primary | ICD-10-CM

## 2021-09-23 DIAGNOSIS — D23.9 DERMAL NEVUS: ICD-10-CM

## 2021-09-23 DIAGNOSIS — L82.1 SEBORRHEIC KERATOSIS: ICD-10-CM

## 2021-09-23 DIAGNOSIS — L81.4 LENTIGO: ICD-10-CM

## 2021-09-23 DIAGNOSIS — D18.01 ANGIOMA OF SKIN: ICD-10-CM

## 2021-09-23 DIAGNOSIS — D48.5 NEOPLASM OF UNCERTAIN BEHAVIOR OF SKIN: ICD-10-CM

## 2021-09-23 PROCEDURE — 11102 TANGNTL BX SKIN SINGLE LES: CPT | Performed by: DERMATOLOGY

## 2021-09-23 PROCEDURE — 11200 RMVL SKIN TAGS UP TO&INC 15: CPT | Mod: 59 | Performed by: DERMATOLOGY

## 2021-09-23 PROCEDURE — 88305 TISSUE EXAM BY PATHOLOGIST: CPT | Performed by: PATHOLOGY

## 2021-09-23 PROCEDURE — 99203 OFFICE O/P NEW LOW 30 MIN: CPT | Mod: 25 | Performed by: DERMATOLOGY

## 2021-09-23 PROCEDURE — 88342 IMHCHEM/IMCYTCHM 1ST ANTB: CPT | Performed by: PATHOLOGY

## 2021-09-23 NOTE — LETTER
2021         RE: Neptali Ospina  [address] [address]        Dear Colleague,    Thank you for referring your patient, Neptali Ospina, to the [facility]. Please see a copy of my visit note below.    Neptali Ospina is an extremely pleasant 51 year old year old male patient here today for skin tags on neck.   .   Patient states this has been present for a whiole.  Patient reports the following symptoms:  itching.  Patient reports the following previous treatments none.  These treatments did not work.  Patient reports the following modifying factors none.  Associated symptoms: none.  Patient has no other skin complaints today.  Remainder of the HPI, Meds, PMH, Allergies, FH, and SH was reviewed in chart.      Past Medical History:   Diagnosis Date     Anxiety      Elevated fasting glucose      High triglycerides      DARLING (nonalcoholic steatohepatitis)     12/10      DANIELLE on CPAP      Strabismus      Thyroid nodule 2012    biopsy thyroglossal ductal cyst.  see Endo     Vitamin D deficiency        Past Surgical History:   Procedure Laterality Date     PE TUBES       TONSILLECTOMY & ADENOIDECTOMY       VASECTOMY          Family History   Problem Relation Age of Onset     Circulatory Father         sleep apnea,   in sleep 58     C.A.D. Mother      Cancer Mother         brain tumor     Gynecology Sister         x2       Social History     Socioeconomic History     Marital status:      Spouse name: Not on file     Number of children: 4     Years of education: Not on file     Highest education level: Not on file   Occupational History     Not on file   Tobacco Use     Smoking status: Never Smoker     Smokeless tobacco: Never Used   Substance and Sexual Activity     Alcohol use: Yes     Alcohol/week: 0.0 standard drinks     Comment: very occ.     Drug use: No     Sexual activity: Yes     Partners: Female     Birth control/protection: Surgical    Other Topics Concern     Parent/sibling w/ CABG, MI or angioplasty before 65F 55M? Yes   Social History Narrative     Not on file     Social Determinants of Health     Financial Resource Strain:      Difficulty of Paying Living Expenses:    Food Insecurity:      Worried About Running Out of Food in the Last Year:      Ran Out of Food in the Last Year:    Transportation Needs:      Lack of Transportation (Medical):      Lack of Transportation (Non-Medical):    Physical Activity:      Days of Exercise per Week:      Minutes of Exercise per Session:    Stress:      Feeling of Stress :    Social Connections:      Frequency of Communication with Friends and Family:      Frequency of Social Gatherings with Friends and Family:      Attends Yazidi Services:      Active Member of Clubs or Organizations:      Attends Club or Organization Meetings:      Marital Status:    Intimate Partner Violence:      Fear of Current or Ex-Partner:      Emotionally Abused:      Physically Abused:      Sexually Abused:        Outpatient Encounter Medications as of 9/23/2021   Medication Sig Dispense Refill     aspirin 81 MG tablet Take 1 tablet by mouth daily. 90 tablet 3     escitalopram (LEXAPRO) 10 MG tablet Take 1 tablet (10 mg) by mouth daily 90 tablet 3     fish oil-omega-3 fatty acids (OMEGA 3) 1000 MG capsule Take 1 capsule by mouth 2 times daily. Lavazo fish oil 180 capsule 1     fluticasone (FLONASE) 50 MCG/ACT nasal spray Spray 1-2 sprays into both nostrils daily 1 Package 1     Multiple Vitamin (MULTI-VITAMIN) per tablet Take 1 tablet by mouth daily. 90 tablet 3     simvastatin (ZOCOR) 20 MG tablet TAKE ONE TABLET BY MOUTH EVERY NIGHT AT BEDTIME 90 tablet 3     No facility-administered encounter medications on file as of 9/23/2021.             O:   NAD, WDWN, Alert & Oriented, Mood & Affect wnl, Vitals stable   Here today alone   Wt 108.9 kg (240 lb)   BMI 36.23 kg/m     General appearance normal   Vitals stable   Alert,  oriented and in no acute distress      Following lymph nodes palpated: Occipital, Cervical, Supraclavicular no lad   R lower back irregularly pigmented macule  Tags on neck   Pigmented macule son trunk and ext with regular borders and pigment networks      Stuck on papules and brown macules on trunk and ext   Red papules on trunk  Flesh colored papules on trunk     The remainder of the full exam was normal; the following areas were examined:  conjunctiva/lids, oral mucosa, neck, peripheral vascular system, abdomen, lymph nodes, digits/nails, eccrine and apocrine glands, scalp/hair, face, neck, chest, abdomen, buttocks, back, RUE, LUE, RLE, LLE       Eyes: Conjunctivae/lids:Normal     ENT: Lips, buccal mucosa, tongue: normal    MSK:Normal    Cardiovascular: peripheral edema none    Pulm: Breathing Normal    Lymph Nodes: No Head and Neck Lymphadenopathy     Neuro/Psych: Orientation:Alert and Orientedx3 ; Mood/Affect:normal       A/P:  1. Seborrheic keratosis, lentigo, angioma, dermal nevus, nevi   2. Tags on neck x12  LN2:  Treated with LN2 for 5s for 1-2 cycles. Warned risks of blistering, pain, pigment change, scarring, and incomplete resolution.  Advised patient to return if lesions do not completely resolve.  Wound care sheet given.  3. R lower back r/o atypical nevus  TANGENTIAL BIOPSY SENT OUT:  After consent, anesthesia with LEC and prep, tangential excision performed and specimen sent out for permanent section histology.  No complications and routine wound care. Patient told to call our office in 1-2 weeks for result.         It was a pleasure speaking to Neptali Ospina today.  Nature and genetics of benign skin lesions dicussed with patient.  Signs and Symptoms of skin cancer discussed with patient.  Patient encouraged to perform monthly skin exams.  UV precautions reviewed with patient.  Risks of non-melanoma skin cancer discussed with patient   Return to clinic pending path         Again, thank you for  allowing me to participate in the care of your patient.        Sincerely,        Jimi Becerra MD

## 2021-09-23 NOTE — PATIENT INSTRUCTIONS
Wound Care Instructions     FOR SUPERFICIAL WOUNDS     [facility] [phone]                       AFTER 24 HOURS YOU SHOULD REMOVE THE BANDAGE AND BEGIN DAILY DRESSING CHANGES AS FOLLOWS:     1) Remove Dressing.     2) Clean and dry the area with tap water using a Q-tip or sterile gauze pad.     3) Apply Vaseline, Aquaphor, Polysporin ointment or Bacitracin ointment over entire wound.  Do NOT use Neosporin ointment.     4) Cover the wound with a band-aid, or a sterile non-stick gauze pad and micropore paper tape      REPEAT THESE INSTRUCTIONS AT LEAST ONCE A DAY UNTIL THE WOUND HAS COMPLETELY HEALED.    It is an old wives tale that a wound heals better when it is exposed to air and allowed to dry out. The wound will heal faster with a better cosmetic result if it is kept moist with ointment and covered with a bandage.    **Do not let the wound dry out.**      Supplies Needed:      *Cotton tipped applicators (Q-tips)    *Polysporin Ointment or Bacitracin Ointment (NOT NEOSPORIN)    *Band-aids or non-stick gauze pads and micropore paper tape.      PATIENT INFORMATION:    During the healing process you will notice a number of changes. All wounds develop a small halo of redness surrounding the wound.  This means healing is occurring. Severe itching with extensive redness usually indicates sensitivity to the ointment or bandage tape used to dress the wound.  You should call our office if this develops.      Swelling  and/or discoloration around your surgical site is common, particularly when performed around the eye.    All wounds normally drain.  The larger the wound the more drainage there will be.  After 7-10 days, you will notice the wound beginning to shrink and new skin will begin to grow.  The wound is healed when you can see skin has formed over the entire area.  A healed wound has a healthy, shiny look to the surface and is red to dark pink in color to normalize.  Wounds may take  approximately 4-6 weeks to heal.  Larger wounds may take 6-8 weeks.  After the wound is healed you may discontinue dressing changes.    You may experience a sensation of tightness as your wound heals. This is normal and will gradually subside.    Your healed wound may be sensitive to temperature changes. This sensitivity improves with time, but if you re having a lot of discomfort, try to avoid temperature extremes.    Patients frequently experience itching after their wound appears to have healed because of the continue healing under the skin.  Plain Vaseline will help relieve the itching.        POSSIBLE COMPLICATIONS    BLEEDIN. Leave the bandage in place.  2. Use tightly rolled up gauze or a cloth to apply direct pressure over the bandage for 30  minutes.  3. Reapply pressure for an additional 30 minutes if necessary  4. Use additional gauze and tape to maintain pressure once the bleeding has stopped.

## 2021-09-23 NOTE — PROGRESS NOTES
Neptali Ospina is an extremely pleasant 51 year old year old male patient here today for skin tags on neck.   .   Patient states this has been present for a whiole.  Patient reports the following symptoms:  itching.  Patient reports the following previous treatments none.  These treatments did not work.  Patient reports the following modifying factors none.  Associated symptoms: none.  Patient has no other skin complaints today.  Remainder of the HPI, Meds, PMH, Allergies, FH, and SH was reviewed in chart.      Past Medical History:   Diagnosis Date     Anxiety      Elevated fasting glucose      High triglycerides      DARLING (nonalcoholic steatohepatitis)     12/10      DANIELLE on CPAP      Strabismus      Thyroid nodule 2012    biopsy thyroglossal ductal cyst.  see Endo     Vitamin D deficiency        Past Surgical History:   Procedure Laterality Date     PE TUBES       TONSILLECTOMY & ADENOIDECTOMY       VASECTOMY          Family History   Problem Relation Age of Onset     Circulatory Father         sleep apnea,   in sleep 58     C.A.D. Mother      Cancer Mother         brain tumor     Gynecology Sister         x2       Social History     Socioeconomic History     Marital status:      Spouse name: Not on file     Number of children: 4     Years of education: Not on file     Highest education level: Not on file   Occupational History     Not on file   Tobacco Use     Smoking status: Never Smoker     Smokeless tobacco: Never Used   Substance and Sexual Activity     Alcohol use: Yes     Alcohol/week: 0.0 standard drinks     Comment: very occ.     Drug use: No     Sexual activity: Yes     Partners: Female     Birth control/protection: Surgical   Other Topics Concern     Parent/sibling w/ CABG, MI or angioplasty before 65F 55M? Yes   Social History Narrative     Not on file     Social Determinants of Health     Financial Resource Strain:      Difficulty of Paying Living Expenses:    Food Insecurity:       Worried About Running Out of Food in the Last Year:      Ran Out of Food in the Last Year:    Transportation Needs:      Lack of Transportation (Medical):      Lack of Transportation (Non-Medical):    Physical Activity:      Days of Exercise per Week:      Minutes of Exercise per Session:    Stress:      Feeling of Stress :    Social Connections:      Frequency of Communication with Friends and Family:      Frequency of Social Gatherings with Friends and Family:      Attends Hindu Services:      Active Member of Clubs or Organizations:      Attends Club or Organization Meetings:      Marital Status:    Intimate Partner Violence:      Fear of Current or Ex-Partner:      Emotionally Abused:      Physically Abused:      Sexually Abused:        Outpatient Encounter Medications as of 9/23/2021   Medication Sig Dispense Refill     aspirin 81 MG tablet Take 1 tablet by mouth daily. 90 tablet 3     escitalopram (LEXAPRO) 10 MG tablet Take 1 tablet (10 mg) by mouth daily 90 tablet 3     fish oil-omega-3 fatty acids (OMEGA 3) 1000 MG capsule Take 1 capsule by mouth 2 times daily. Lavazo fish oil 180 capsule 1     fluticasone (FLONASE) 50 MCG/ACT nasal spray Spray 1-2 sprays into both nostrils daily 1 Package 1     Multiple Vitamin (MULTI-VITAMIN) per tablet Take 1 tablet by mouth daily. 90 tablet 3     simvastatin (ZOCOR) 20 MG tablet TAKE ONE TABLET BY MOUTH EVERY NIGHT AT BEDTIME 90 tablet 3     No facility-administered encounter medications on file as of 9/23/2021.             O:   NAD, WDWN, Alert & Oriented, Mood & Affect wnl, Vitals stable   Here today alone   Wt 108.9 kg (240 lb)   BMI 36.23 kg/m     General appearance normal   Vitals stable   Alert, oriented and in no acute distress      Following lymph nodes palpated: Occipital, Cervical, Supraclavicular no lad   R lower back irregularly pigmented macule  Tags on neck   Pigmented macule son trunk and ext with regular borders and pigment networks      Stuck on  papules and brown macules on trunk and ext   Red papules on trunk  Flesh colored papules on trunk     The remainder of the full exam was normal; the following areas were examined:  conjunctiva/lids, oral mucosa, neck, peripheral vascular system, abdomen, lymph nodes, digits/nails, eccrine and apocrine glands, scalp/hair, face, neck, chest, abdomen, buttocks, back, RUE, LUE, RLE, LLE       Eyes: Conjunctivae/lids:Normal     ENT: Lips, buccal mucosa, tongue: normal    MSK:Normal    Cardiovascular: peripheral edema none    Pulm: Breathing Normal    Lymph Nodes: No Head and Neck Lymphadenopathy     Neuro/Psych: Orientation:Alert and Orientedx3 ; Mood/Affect:normal       A/P:  1. Seborrheic keratosis, lentigo, angioma, dermal nevus, nevi   2. Tags on neck x12  LN2:  Treated with LN2 for 5s for 1-2 cycles. Warned risks of blistering, pain, pigment change, scarring, and incomplete resolution.  Advised patient to return if lesions do not completely resolve.  Wound care sheet given.  3. R lower back r/o atypical nevus  TANGENTIAL BIOPSY SENT OUT:  After consent, anesthesia with LEC and prep, tangential excision performed and specimen sent out for permanent section histology.  No complications and routine wound care. Patient told to call our office in 1-2 weeks for result.         It was a pleasure speaking to Neptali Ospina today.  Nature and genetics of benign skin lesions dicussed with patient.  Signs and Symptoms of skin cancer discussed with patient.  Patient encouraged to perform monthly skin exams.  UV precautions reviewed with patient.  Risks of non-melanoma skin cancer discussed with patient   Return to clinic pending path

## 2021-09-23 NOTE — NURSING NOTE
"Initial Wt 108.9 kg (240 lb)   BMI 36.23 kg/m   Estimated body mass index is 36.23 kg/m  as calculated from the following:    Height as of 3/12/21: 1.734 m (5' 8.25\").    Weight as of this encounter: 108.9 kg (240 lb).     DOMONIQUE Edwards-BSN-N  [facility]  [phone]  "

## 2021-09-25 ENCOUNTER — HEALTH MAINTENANCE LETTER (OUTPATIENT)
Age: 51
End: 2021-09-25

## 2021-09-29 LAB
PATH REPORT.COMMENTS IMP SPEC: NORMAL
PATH REPORT.COMMENTS IMP SPEC: NORMAL
PATH REPORT.FINAL DX SPEC: NORMAL
PATH REPORT.GROSS SPEC: NORMAL
PATH REPORT.MICROSCOPIC SPEC OTHER STN: NORMAL
PATH REPORT.RELEVANT HX SPEC: NORMAL

## 2021-11-16 ENCOUNTER — OFFICE VISIT (OUTPATIENT)
Dept: FAMILY MEDICINE | Facility: CLINIC | Age: 51
End: 2021-11-16
Payer: COMMERCIAL

## 2021-11-16 VITALS
HEIGHT: 68 IN | TEMPERATURE: 98.1 F | BODY MASS INDEX: 36.98 KG/M2 | OXYGEN SATURATION: 98 % | SYSTOLIC BLOOD PRESSURE: 144 MMHG | HEART RATE: 88 BPM | RESPIRATION RATE: 18 BRPM | DIASTOLIC BLOOD PRESSURE: 92 MMHG | WEIGHT: 244 LBS

## 2021-11-16 DIAGNOSIS — Z23 COVID-19 VACCINE ADMINISTERED: ICD-10-CM

## 2021-11-16 DIAGNOSIS — R00.2 PALPITATIONS: ICD-10-CM

## 2021-11-16 DIAGNOSIS — G47.33 OSA ON CPAP: ICD-10-CM

## 2021-11-16 DIAGNOSIS — E78.2 MIXED HYPERLIPIDEMIA: ICD-10-CM

## 2021-11-16 DIAGNOSIS — R03.0 WHITE COAT SYNDROME WITHOUT DIAGNOSIS OF HYPERTENSION: ICD-10-CM

## 2021-11-16 DIAGNOSIS — E66.01 MORBID OBESITY (H): ICD-10-CM

## 2021-11-16 DIAGNOSIS — F41.9 ANXIETY: ICD-10-CM

## 2021-11-16 DIAGNOSIS — Z00.00 ROUTINE GENERAL MEDICAL EXAMINATION AT A HEALTH CARE FACILITY: Primary | ICD-10-CM

## 2021-11-16 DIAGNOSIS — Z12.5 SCREENING FOR PROSTATE CANCER: ICD-10-CM

## 2021-11-16 DIAGNOSIS — Z23 ENCOUNTER FOR ADMINISTRATION OF VACCINE: ICD-10-CM

## 2021-11-16 DIAGNOSIS — Z12.11 SCREEN FOR COLON CANCER: ICD-10-CM

## 2021-11-16 LAB
ALBUMIN SERPL-MCNC: 4 G/DL (ref 3.4–5)
ALP SERPL-CCNC: 63 U/L (ref 40–150)
ALT SERPL W P-5'-P-CCNC: 72 U/L (ref 0–70)
ANION GAP SERPL CALCULATED.3IONS-SCNC: 3 MMOL/L (ref 3–14)
AST SERPL W P-5'-P-CCNC: 40 U/L (ref 0–45)
BILIRUB SERPL-MCNC: 0.6 MG/DL (ref 0.2–1.3)
BUN SERPL-MCNC: 18 MG/DL (ref 7–30)
CALCIUM SERPL-MCNC: 9.9 MG/DL (ref 8.5–10.1)
CHLORIDE BLD-SCNC: 107 MMOL/L (ref 94–109)
CHOLEST SERPL-MCNC: 183 MG/DL
CO2 SERPL-SCNC: 30 MMOL/L (ref 20–32)
CREAT SERPL-MCNC: 0.94 MG/DL (ref 0.66–1.25)
FASTING STATUS PATIENT QL REPORTED: YES
GFR SERPL CREATININE-BSD FRML MDRD: >90 ML/MIN/1.73M2
GLUCOSE BLD-MCNC: 108 MG/DL (ref 70–99)
HBA1C MFR BLD: 5.5 % (ref 0–5.6)
HDLC SERPL-MCNC: 34 MG/DL
LDLC SERPL CALC-MCNC: 96 MG/DL
NONHDLC SERPL-MCNC: 149 MG/DL
POTASSIUM BLD-SCNC: 4.6 MMOL/L (ref 3.4–5.3)
PROT SERPL-MCNC: 7.9 G/DL (ref 6.8–8.8)
PSA SERPL-MCNC: 0.56 UG/L (ref 0–4)
SODIUM SERPL-SCNC: 140 MMOL/L (ref 133–144)
TRIGL SERPL-MCNC: 267 MG/DL
TSH SERPL DL<=0.005 MIU/L-ACNC: 2.49 MU/L (ref 0.4–4)

## 2021-11-16 PROCEDURE — G0103 PSA SCREENING: HCPCS | Performed by: PHYSICIAN ASSISTANT

## 2021-11-16 PROCEDURE — 84443 ASSAY THYROID STIM HORMONE: CPT | Performed by: PHYSICIAN ASSISTANT

## 2021-11-16 PROCEDURE — 99214 OFFICE O/P EST MOD 30 MIN: CPT | Mod: 25 | Performed by: PHYSICIAN ASSISTANT

## 2021-11-16 PROCEDURE — 80053 COMPREHEN METABOLIC PANEL: CPT | Performed by: PHYSICIAN ASSISTANT

## 2021-11-16 PROCEDURE — 0004A COVID-19,PF,PFIZER (12+ YRS): CPT | Performed by: PHYSICIAN ASSISTANT

## 2021-11-16 PROCEDURE — 90682 RIV4 VACC RECOMBINANT DNA IM: CPT | Performed by: PHYSICIAN ASSISTANT

## 2021-11-16 PROCEDURE — 80061 LIPID PANEL: CPT | Performed by: PHYSICIAN ASSISTANT

## 2021-11-16 PROCEDURE — 90750 HZV VACC RECOMBINANT IM: CPT | Performed by: PHYSICIAN ASSISTANT

## 2021-11-16 PROCEDURE — 99396 PREV VISIT EST AGE 40-64: CPT | Mod: 25 | Performed by: PHYSICIAN ASSISTANT

## 2021-11-16 PROCEDURE — 90471 IMMUNIZATION ADMIN: CPT | Performed by: PHYSICIAN ASSISTANT

## 2021-11-16 PROCEDURE — 83036 HEMOGLOBIN GLYCOSYLATED A1C: CPT | Performed by: PHYSICIAN ASSISTANT

## 2021-11-16 PROCEDURE — 36415 COLL VENOUS BLD VENIPUNCTURE: CPT | Performed by: PHYSICIAN ASSISTANT

## 2021-11-16 PROCEDURE — 90472 IMMUNIZATION ADMIN EACH ADD: CPT | Performed by: PHYSICIAN ASSISTANT

## 2021-11-16 PROCEDURE — 91300 COVID-19,PF,PFIZER (12+ YRS): CPT | Performed by: PHYSICIAN ASSISTANT

## 2021-11-16 RX ORDER — ESCITALOPRAM OXALATE 10 MG/1
10 TABLET ORAL DAILY
Qty: 90 TABLET | Refills: 3 | Status: SHIPPED | OUTPATIENT
Start: 2021-11-16 | End: 2022-12-12

## 2021-11-16 RX ORDER — SIMVASTATIN 20 MG
TABLET ORAL
Qty: 90 TABLET | Refills: 3 | Status: SHIPPED | OUTPATIENT
Start: 2021-11-16 | End: 2022-12-15

## 2021-11-16 ASSESSMENT — ENCOUNTER SYMPTOMS
HEARTBURN: 0
NAUSEA: 0
SORE THROAT: 0
EYE PAIN: 0
JOINT SWELLING: 0
WEAKNESS: 0
CHILLS: 0
FEVER: 0
ARTHRALGIAS: 0
ABDOMINAL PAIN: 0
HEADACHES: 0
MYALGIAS: 0
DIZZINESS: 0
HEMATURIA: 0
SHORTNESS OF BREATH: 0
PALPITATIONS: 0
CONSTIPATION: 0
NERVOUS/ANXIOUS: 1
PARESTHESIAS: 0
HEMATOCHEZIA: 0
COUGH: 0
DIARRHEA: 0
DYSURIA: 0
FREQUENCY: 0

## 2021-11-16 ASSESSMENT — PAIN SCALES - GENERAL: PAINLEVEL: NO PAIN (0)

## 2021-11-16 ASSESSMENT — MIFFLIN-ST. JEOR: SCORE: 1945.25

## 2021-11-16 NOTE — PROGRESS NOTES
SUBJECTIVE:   CC: Neptali Ospina is an 51 year old male who presents for preventative health visit.     Patient has been advised of split billing requirements and indicates understanding: Yes  Healthy Habits:     Getting at least 3 servings of Calcium per day:  Yes    Bi-annual eye exam:  Yes    Dental care twice a year:  Yes    Sleep apnea or symptoms of sleep apnea:  Sleep apnea    Diet:  Regular (no restrictions)    Frequency of exercise:  4-5 days/week    Duration of exercise:  Greater than 60 minutes    Taking medications regularly:  Yes    Medication side effects:  None    PHQ-2 Total Score: 0    Additional concerns today:  Yes      History of elevated blood pressure readings without diagnosis of HTN. He feels significant anxiety coming to clinic and blood pressures are usually elevated. He did see cardiology in 2018 who felt he had white coat HTN due to anxiety. He monitors his BP at home and readings are variable, sometimes 130s/80s but sometimes higher. He feels that checking his blood pressure at home causes more anxiety and then blood pressure rises which increases anxiety more. BP last night was 116/70. BP was 144/88 at home today - he has been anxious all day anticipating this clinic visit this morning. No chest pain, shortness of breath, swelling in the extremities, dizziness, blurred vision, or headaches. He would like to try a 24 hour ambulatory blood pressure monitor to see if he really has high blood pressure or not. He is open to taking medication if needed but wants to know if he actually has high blood pressure or if it is just white coat hypertension or related to anxiety.    He does notice some palpitations at times when he is anxious. No chest pain, shortness of breath, dizziness, lightheadedness. He exercises regularly without symptoms.     BP Readings from Last 4 Encounters:   11/16/21 170/104, 144/92 on recheck  11/02/20 146/88  09/24/19 150/92 (pulled from [facility])  10/31/18  164/92 (pulled from [employer])  09/06/18 132/92 (pulled from [employer])  03/13/18 188/96 (pulled from [employer])     History of anxiety. Taking Lexapro 10 mg daily with good control. No side effects. No suicidal or homicidal ideation. Would like to continue medication without change.    Today's PHQ-2 Score:   PHQ-2 ( 1999 Pfizer) 11/16/2021   Q1: Little interest or pleasure in doing things 0   Q2: Feeling down, depressed or hopeless 0   PHQ-2 Score 0   Q1: Little interest or pleasure in doing things Not at all   Q2: Feeling down, depressed or hopeless Not at all   PHQ-2 Score 0       Abuse: Current or Past(Physical, Sexual or Emotional)- No  Do you feel safe in your environment? Yes    Have you ever done Advance Care Planning? (For example, a Health Directive, POLST, or a discussion with a medical provider or your loved ones about your wishes): No, advance care planning information given to patient to review.  Patient declined advance care planning discussion at this time.    Social History     Tobacco Use     Smoking status: Never Smoker     Smokeless tobacco: Never Used   Substance Use Topics     Alcohol use: Yes     Alcohol/week: 0.0 standard drinks     Comment: very occ.     If you drink alcohol do you typically have >3 drinks per day or >7 drinks per week? No    Alcohol Use 11/16/2021   Prescreen: >3 drinks/day or >7 drinks/week? No   Prescreen: >3 drinks/day or >7 drinks/week? -       Last PSA: No results found for: PSA    Reviewed orders with patient. Reviewed health maintenance and updated orders accordingly - Yes  Lab work is in process  Labs reviewed in EPIC  BP Readings from Last 3 Encounters:   11/16/21 (!) 144/92   03/12/21 128/78   11/02/20 (!) 146/88    Wt Readings from Last 3 Encounters:   11/16/21 110.7 kg (244 lb)   09/23/21 108.9 kg (240 lb)   03/12/21 108.9 kg (240 lb)                  Patient Active Problem List   Diagnosis     Obesity     DANIELLE on CPAP     Vitamin D deficiency      Left thyroid nodule     Anxiety     White coat syndrome without diagnosis of hypertension     CARDIOVASCULAR SCREENING; LDL GOAL LESS THAN 160     Morbid obesity (H)     Past Surgical History:   Procedure Laterality Date     PE TUBES       TONSILLECTOMY & ADENOIDECTOMY       VASECTOMY         Social History     Tobacco Use     Smoking status: Never Smoker     Smokeless tobacco: Never Used   Substance Use Topics     Alcohol use: Yes     Alcohol/week: 0.0 standard drinks     Comment: very occ.     Family History   Problem Relation Age of Onset     C.A.D. Mother      Cancer Mother         brain tumor     Circulatory Father         sleep apnea,   in sleep 58     Gynecology Sister         x2         Current Outpatient Medications   Medication Sig Dispense Refill     aspirin 81 MG tablet Take 1 tablet by mouth daily. 90 tablet 3     escitalopram (LEXAPRO) 10 MG tablet Take 1 tablet (10 mg) by mouth daily 90 tablet 3     fish oil-omega-3 fatty acids (OMEGA 3) 1000 MG capsule Take 1 capsule by mouth 2 times daily. Lavazo fish oil 180 capsule 1     fluticasone (FLONASE) 50 MCG/ACT nasal spray Spray 1-2 sprays into both nostrils daily 1 Package 1     Multiple Vitamin (MULTI-VITAMIN) per tablet Take 1 tablet by mouth daily. 90 tablet 3     simvastatin (ZOCOR) 20 MG tablet TAKE ONE TABLET BY MOUTH EVERY NIGHT AT BEDTIME 90 tablet 3     Allergies   Allergen Reactions     Nkda [No Known Drug Allergies]      Recent Labs   Lab Test 21  0927 20  1628 16  0821 03/09/15  0836   A1C 5.5 5.4 5.5  --    LDL  --  96 77 66   HDL  --  37* 40 42   TRIG  --  235* 216* 120   ALT  --  84* 57  --    CR  --  0.87 0.92  --    GFRESTIMATED  --  >90 89  --    GFRESTBLACK  --  >90 >90  African American GFR Calc    --    POTASSIUM  --  4.1 3.5  --    TSH  --  2.70 2.34 2.54        Reviewed and updated as needed this visit by clinical staff  Tobacco  Allergies  Meds  Problems  Med Hx  Surg Hx  Fam Hx         Reviewed and  "updated as needed this visit by Provider  Tobacco  Allergies  Meds  Problems  Med Hx  Surg Hx  Fam Hx            Review of Systems   Constitutional: Negative for chills and fever.   HENT: Negative for congestion, ear pain, hearing loss and sore throat.    Eyes: Negative for pain and visual disturbance.   Respiratory: Negative for cough and shortness of breath.    Cardiovascular: Negative for chest pain, palpitations and peripheral edema.   Gastrointestinal: Negative for abdominal pain, constipation, diarrhea, heartburn, hematochezia and nausea.   Genitourinary: Negative for dysuria, frequency, genital sores, hematuria, impotence, penile discharge and urgency.   Musculoskeletal: Negative for arthralgias, joint swelling and myalgias.   Skin: Negative for rash.   Neurological: Negative for dizziness, weakness, headaches and paresthesias.   Psychiatric/Behavioral: Negative for mood changes. The patient is nervous/anxious.        OBJECTIVE:   BP (!) 144/92   Pulse 88   Temp 98.1  F (36.7  C) (Oral)   Resp 18   Ht 1.734 m (5' 8.25\")   Wt 110.7 kg (244 lb)   SpO2 98%   BMI 36.83 kg/m      Physical Exam  GENERAL: healthy, alert and no distress  EYES: Eyes grossly normal to inspection, PERRL and conjunctivae and sclerae normal  HENT: ear canals and TM's normal, nose and mouth without ulcers or lesions  NECK: no adenopathy, no asymmetry, masses, or scars and thyroid normal to palpation  RESP: lungs clear to auscultation - no rales, rhonchi or wheezes  CV: regular rate and rhythm, normal S1 S2, no S3 or S4, no murmur, click or rub, no peripheral edema and peripheral pulses strong  ABDOMEN: soft, nontender, no hepatosplenomegaly, no masses and bowel sounds normal  MS: no gross musculoskeletal defects noted, no edema  SKIN: no suspicious lesions or rashes  NEURO: Normal strength and tone, mentation intact and speech normal  PSYCH: mentation appears normal, affect normal/bright    Diagnostic Test Results:  Labs " reviewed in Epic    ASSESSMENT/PLAN:   1. Routine general medical examination at a health care facility  Reviewed personal and family history. Reviewed age appropriate screenings. Recommended any needed vaccinations. Continue to focus on well balanced diet and exercise.   - Lipid panel reflex to direct LDL Fasting; Future  - Comprehensive metabolic panel (BMP + Alb, Alk Phos, ALT, AST, Total. Bili, TP); Future  - Hemoglobin A1c; Future  - TSH with free T4 reflex; Future    2. Screen for colon cancer  - Adult Gastro Ref - Procedure Only; Future    3. Palpitations  Intermittent palpitations that seem related to anxiety without associated red flag symptoms. Will check heart monitor and follow-up with results. ER if any red flag symptoms we discussed.  - Leadless EKG Monitor 3 to 7 Days; Future    4. White coat syndrome without diagnosis of hypertension  History of elevated blood pressure readings without diagnosis of HTN. He did see cardiology in 2018 who felt he had white coat HTN due to anxiety (significant anxiety coming to clinic). He monitors his BP at home and readings are variable, sometimes 130s/80s but sometimes higher - seems the more he checks his BP the more anxious he gets and then his BP gets higher. BP in clinic today checked throughout the visit and did come down quite a bit on recheck after he was feeling less anxious, though still elevated at 144/92. He would like to try a 24 hour ambulatory blood pressure monitor to see if he really has high blood pressure or not. He is open to taking medication if needed but wants to know if he actually has high blood pressure or if it is just white coat hypertension or related to anxiety.  - 24 Hour Blood Pressure Monitor - Adult; Future    5. Mixed hyperlipidemia  Chronic issue. Taking Simvastatin 20 mg without side effects. Will check labs, refill meds.  - Lipid panel reflex to direct LDL Fasting; Future  - Comprehensive metabolic panel (BMP + Alb, Alk Phos, ALT,  "AST, Total. Bili, TP); Future  - simvastatin (ZOCOR) 20 MG tablet; TAKE ONE TABLET BY MOUTH EVERY NIGHT AT BEDTIME  Dispense: 90 tablet; Refill: 3    6. Anxiety  Chronic, stable. Refills given.  - escitalopram (LEXAPRO) 10 MG tablet; Take 1 tablet (10 mg) by mouth daily  Dispense: 90 tablet; Refill: 3    7. Screening for prostate cancer  Discussed risk vs benefit of screening and patient would like to proceed.  - PSA, screen; Future    8. COVID-19 vaccine administered  - COVID-19,PF,PFIZER (12+ Yrs PURPLE LABEL)    9. Encounter for administration of vaccine  - ZOSTER VACCINE RECOMBINANT ADJUVANTED IM NJX    10. Morbid obesity (H)  Discussed healthy lifestyle    11. DANIELLE on CPAP  Chronic issue, doing well with CPAP.      Patient has been advised of split billing requirements and indicates understanding: Yes  COUNSELING:   Reviewed preventive health counseling, as reflected in patient instructions    Estimated body mass index is 36.83 kg/m  as calculated from the following:    Height as of this encounter: 1.734 m (5' 8.25\").    Weight as of this encounter: 110.7 kg (244 lb).     Weight management plan: Discussed healthy diet and exercise guidelines    He reports that he has never smoked. He has never used smokeless tobacco.      Counseling Resources:  ATP IV Guidelines  Pooled Cohorts Equation Calculator  FRAX Risk Assessment  ICSI Preventive Guidelines  Dietary Guidelines for Americans, 2010  USDA's MyPlate  ASA Prophylaxis  Lung CA Screening    [facility]  "

## 2021-11-23 ENCOUNTER — TRANSFERRED RECORDS (OUTPATIENT)
Dept: HEALTH INFORMATION MANAGEMENT | Facility: CLINIC | Age: 51
End: 2021-11-23

## 2021-11-23 ENCOUNTER — HOSPITAL ENCOUNTER (OUTPATIENT)
Dept: CARDIOLOGY | Facility: CLINIC | Age: 51
Discharge: HOME | End: 2021-11-23
Attending: PHYSICIAN ASSISTANT | Admitting: PHYSICIAN ASSISTANT
Payer: COMMERCIAL

## 2021-11-23 DIAGNOSIS — R00.2 PALPITATIONS: ICD-10-CM

## 2021-11-23 PROCEDURE — 93244 EXT ECG>48HR<7D REV&INTERPJ: CPT | Performed by: INTERNAL MEDICINE

## 2021-11-23 PROCEDURE — 93242 EXT ECG>48HR<7D RECORDING: CPT

## 2021-12-05 DIAGNOSIS — Z11.59 ENCOUNTER FOR SCREENING FOR OTHER VIRAL DISEASES: ICD-10-CM

## 2021-12-09 ENCOUNTER — TELEPHONE (OUTPATIENT)
Dept: CARDIOLOGY | Facility: CLINIC | Age: 51
End: 2021-12-09
Payer: COMMERCIAL

## 2021-12-09 ENCOUNTER — DOCUMENTATION ONLY (OUTPATIENT)
Dept: CARDIOLOGY | Facility: CLINIC | Age: 51
End: 2021-12-09

## 2021-12-09 ENCOUNTER — OFFICE VISIT (OUTPATIENT)
Dept: CARDIOLOGY | Facility: CLINIC | Age: 51
End: 2021-12-09
Attending: INTERNAL MEDICINE
Payer: COMMERCIAL

## 2021-12-09 ENCOUNTER — HOSPITAL ENCOUNTER (INPATIENT)
Facility: CLINIC | Age: 51
LOS: 6 days | Discharge: HOME | DRG: 274 | End: 2021-12-15
Attending: EMERGENCY MEDICINE | Admitting: STUDENT IN AN ORGANIZED HEALTH CARE EDUCATION/TRAINING PROGRAM
Payer: COMMERCIAL

## 2021-12-09 VITALS
HEART RATE: 112 BPM | DIASTOLIC BLOOD PRESSURE: 89 MMHG | BODY MASS INDEX: 36.22 KG/M2 | WEIGHT: 239 LBS | SYSTOLIC BLOOD PRESSURE: 192 MMHG | HEIGHT: 68 IN

## 2021-12-09 DIAGNOSIS — I47.29 PAROXYSMAL VENTRICULAR TACHYCARDIA (H): ICD-10-CM

## 2021-12-09 DIAGNOSIS — I10 BENIGN ESSENTIAL HYPERTENSION: Primary | ICD-10-CM

## 2021-12-09 DIAGNOSIS — R00.2 PALPITATIONS: Primary | ICD-10-CM

## 2021-12-09 DIAGNOSIS — R00.2 PALPITATIONS: ICD-10-CM

## 2021-12-09 LAB
ALBUMIN SERPL-MCNC: 4 G/DL (ref 3.4–5)
ALP SERPL-CCNC: 63 U/L (ref 40–150)
ALT SERPL W P-5'-P-CCNC: 67 U/L (ref 0–70)
ANION GAP SERPL CALCULATED.3IONS-SCNC: 8 MMOL/L (ref 3–14)
AST SERPL W P-5'-P-CCNC: 32 U/L (ref 0–45)
ATRIAL RATE - MUSE: 106 BPM
BASOPHILS # BLD AUTO: 0 10E3/UL (ref 0–0.2)
BASOPHILS NFR BLD AUTO: 0 %
BILIRUB SERPL-MCNC: 0.4 MG/DL (ref 0.2–1.3)
BUN SERPL-MCNC: 18 MG/DL (ref 7–30)
CALCIUM SERPL-MCNC: 9.4 MG/DL (ref 8.5–10.1)
CHLORIDE BLD-SCNC: 107 MMOL/L (ref 94–109)
CO2 SERPL-SCNC: 26 MMOL/L (ref 20–32)
CREAT SERPL-MCNC: 0.86 MG/DL (ref 0.66–1.25)
DIASTOLIC BLOOD PRESSURE - MUSE: NORMAL MMHG
EOSINOPHIL # BLD AUTO: 0 10E3/UL (ref 0–0.7)
EOSINOPHIL NFR BLD AUTO: 0 %
ERYTHROCYTE [DISTWIDTH] IN BLOOD BY AUTOMATED COUNT: 12.2 % (ref 10–15)
GFR SERPL CREATININE-BSD FRML MDRD: >90 ML/MIN/1.73M2
GLUCOSE BLD-MCNC: 124 MG/DL (ref 70–99)
HCT VFR BLD AUTO: 44.9 % (ref 40–53)
HGB BLD-MCNC: 15.1 G/DL (ref 13.3–17.7)
IMM GRANULOCYTES # BLD: 0.1 10E3/UL
IMM GRANULOCYTES NFR BLD: 1 %
INTERPRETATION ECG - MUSE: NORMAL
LYMPHOCYTES # BLD AUTO: 1.1 10E3/UL (ref 0.8–5.3)
LYMPHOCYTES NFR BLD AUTO: 10 %
MAGNESIUM SERPL-MCNC: 2 MG/DL (ref 1.6–2.3)
MCH RBC QN AUTO: 29.7 PG (ref 26.5–33)
MCHC RBC AUTO-ENTMCNC: 33.6 G/DL (ref 31.5–36.5)
MCV RBC AUTO: 88 FL (ref 78–100)
MONOCYTES # BLD AUTO: 0.6 10E3/UL (ref 0–1.3)
MONOCYTES NFR BLD AUTO: 6 %
NEUTROPHILS # BLD AUTO: 8.6 10E3/UL (ref 1.6–8.3)
NEUTROPHILS NFR BLD AUTO: 83 %
NRBC # BLD AUTO: 0 10E3/UL
NRBC BLD AUTO-RTO: 0 /100
P AXIS - MUSE: 41 DEGREES
PLATELET # BLD AUTO: 178 10E3/UL (ref 150–450)
POTASSIUM BLD-SCNC: 3.8 MMOL/L (ref 3.4–5.3)
PR INTERVAL - MUSE: 184 MS
PROT SERPL-MCNC: 7.8 G/DL (ref 6.8–8.8)
QRS DURATION - MUSE: 68 MS
QT - MUSE: 336 MS
QTC - MUSE: 446 MS
R AXIS - MUSE: 25 DEGREES
RBC # BLD AUTO: 5.08 10E6/UL (ref 4.4–5.9)
SARS-COV-2 RNA RESP QL NAA+PROBE: NEGATIVE
SODIUM SERPL-SCNC: 141 MMOL/L (ref 133–144)
SYSTOLIC BLOOD PRESSURE - MUSE: NORMAL MMHG
T AXIS - MUSE: 54 DEGREES
TROPONIN I SERPL HS-MCNC: 6 NG/L
TSH SERPL DL<=0.005 MIU/L-ACNC: 2.38 MU/L (ref 0.4–4)
VENTRICULAR RATE- MUSE: 106 BPM
WBC # BLD AUTO: 10.3 10E3/UL (ref 4–11)

## 2021-12-09 PROCEDURE — 99285 EMERGENCY DEPT VISIT HI MDM: CPT

## 2021-12-09 PROCEDURE — 84484 ASSAY OF TROPONIN QUANT: CPT | Performed by: EMERGENCY MEDICINE

## 2021-12-09 PROCEDURE — 87635 SARS-COV-2 COVID-19 AMP PRB: CPT | Performed by: EMERGENCY MEDICINE

## 2021-12-09 PROCEDURE — 99223 1ST HOSP IP/OBS HIGH 75: CPT | Mod: AI | Performed by: STUDENT IN AN ORGANIZED HEALTH CARE EDUCATION/TRAINING PROGRAM

## 2021-12-09 PROCEDURE — 36415 COLL VENOUS BLD VENIPUNCTURE: CPT | Performed by: EMERGENCY MEDICINE

## 2021-12-09 PROCEDURE — 84443 ASSAY THYROID STIM HORMONE: CPT | Performed by: EMERGENCY MEDICINE

## 2021-12-09 PROCEDURE — 250N000013 HC RX MED GY IP 250 OP 250 PS 637: Performed by: EMERGENCY MEDICINE

## 2021-12-09 PROCEDURE — 210N000002 HC R&B HEART CARE

## 2021-12-09 PROCEDURE — 93005 ELECTROCARDIOGRAM TRACING: CPT

## 2021-12-09 PROCEDURE — 80053 COMPREHEN METABOLIC PANEL: CPT | Performed by: EMERGENCY MEDICINE

## 2021-12-09 PROCEDURE — C9803 HOPD COVID-19 SPEC COLLECT: HCPCS

## 2021-12-09 PROCEDURE — 83735 ASSAY OF MAGNESIUM: CPT | Performed by: EMERGENCY MEDICINE

## 2021-12-09 PROCEDURE — 250N000013 HC RX MED GY IP 250 OP 250 PS 637: Performed by: STUDENT IN AN ORGANIZED HEALTH CARE EDUCATION/TRAINING PROGRAM

## 2021-12-09 PROCEDURE — 85004 AUTOMATED DIFF WBC COUNT: CPT | Performed by: EMERGENCY MEDICINE

## 2021-12-09 PROCEDURE — 93000 ELECTROCARDIOGRAM COMPLETE: CPT | Performed by: INTERNAL MEDICINE

## 2021-12-09 PROCEDURE — 5A09357 ASSISTANCE WITH RESPIRATORY VENTILATION, LESS THAN 24 CONSECUTIVE HOURS, CONTINUOUS POSITIVE AIRWAY PRESSURE: ICD-10-PCS | Performed by: STUDENT IN AN ORGANIZED HEALTH CARE EDUCATION/TRAINING PROGRAM

## 2021-12-09 PROCEDURE — 99203 OFFICE O/P NEW LOW 30 MIN: CPT | Performed by: INTERNAL MEDICINE

## 2021-12-09 RX ORDER — ONDANSETRON 2 MG/ML
4 INJECTION INTRAMUSCULAR; INTRAVENOUS EVERY 6 HOURS PRN
Status: DISCONTINUED | OUTPATIENT
Start: 2021-12-09 | End: 2021-12-15 | Stop reason: HOSPADM

## 2021-12-09 RX ORDER — ONDANSETRON 4 MG/1
4 TABLET, ORALLY DISINTEGRATING ORAL EVERY 6 HOURS PRN
Status: DISCONTINUED | OUTPATIENT
Start: 2021-12-09 | End: 2021-12-15 | Stop reason: HOSPADM

## 2021-12-09 RX ORDER — ESCITALOPRAM OXALATE 10 MG/1
10 TABLET ORAL EVERY EVENING
Status: DISCONTINUED | OUTPATIENT
Start: 2021-12-09 | End: 2021-12-10

## 2021-12-09 RX ORDER — ACETAMINOPHEN 500 MG
1000 TABLET ORAL ONCE
Status: COMPLETED | OUTPATIENT
Start: 2021-12-09 | End: 2021-12-09

## 2021-12-09 RX ORDER — LORAZEPAM 0.5 MG/1
0.5 TABLET ORAL EVERY 4 HOURS PRN
Status: DISCONTINUED | OUTPATIENT
Start: 2021-12-09 | End: 2021-12-15 | Stop reason: HOSPADM

## 2021-12-09 RX ORDER — SIMVASTATIN 20 MG
20 TABLET ORAL AT BEDTIME
Status: DISCONTINUED | OUTPATIENT
Start: 2021-12-09 | End: 2021-12-15 | Stop reason: HOSPADM

## 2021-12-09 RX ADMIN — ESCITALOPRAM OXALATE 10 MG: 10 TABLET ORAL at 21:41

## 2021-12-09 RX ADMIN — ACETAMINOPHEN 1000 MG: 500 TABLET, FILM COATED ORAL at 18:49

## 2021-12-09 RX ADMIN — SIMVASTATIN 20 MG: 20 TABLET, FILM COATED ORAL at 21:41

## 2021-12-09 RX ADMIN — LORAZEPAM 0.5 MG: 0.5 TABLET ORAL at 16:09

## 2021-12-09 ASSESSMENT — ACTIVITIES OF DAILY LIVING (ADL)
ADLS_ACUITY_SCORE: 4

## 2021-12-09 ASSESSMENT — ENCOUNTER SYMPTOMS
LIGHT-HEADEDNESS: 1
PALPITATIONS: 1

## 2021-12-09 ASSESSMENT — MIFFLIN-ST. JEOR
SCORE: 1922.85
SCORE: 1923.13

## 2021-12-09 NOTE — TELEPHONE ENCOUNTER
Received message from [employer] regarding an urgent report. Patient had 7 day Zio patch monitor placed by primary Dr for palpitations. Per report patient had 494 VT runs with an average rate of 263bpm.     Reviewed episodes with Dr Hutton. Dr Hutton would like patient to be seen in EP clinic today. Attempted to reach patient and wife, voicemail left with call back number provided.    When patient calls back will review Zio patch findings, Dr Hutton's request for clinic visit today and patient should not drive or operated machinery at this time.     ++Addendum++  Wife(Florina) returned call to clinic. Above information was reviewed with the patient. Patient and wife will come to [facility] [place] location at 11:15am.

## 2021-12-09 NOTE — PROGRESS NOTES
Called patient placement attempted to get patient directly admitted per Dr. Hutton's request. Unfortunately no cardiac telemetry beds were available at this time. After that update Dr. Hutton requested patient to brought down to the emergency department.    Called [facility] Emergency Department and gave them a heads up that a patient would be brought down from the heart clinic as he was unable to be directly admitted. Additionally a copy of patient's zio patch results was provided as final copy is not yet available to view in patient's chart.    Will place final signed copy in stat scan bin today.

## 2021-12-09 NOTE — PROGRESS NOTES
"HPI and Plan:   Delightful but quite anxious patient with known history of PVCs but otherwise in excellent state of health on no meds. Exercises daily with weights lifting without any sob, cp, or palpitations. Recently, noted to have \"fluttering along with flushed sensation down in his thighs\" requested zio for documentation.     I was made aware of Zio finding of sustained fast VT by one of our RNs and asked the patient to see me on an urgent basis. Pt triggered multiple times for VT with duration from 10-15 beats to over 3 minutes long with avg rate of ~250 bpm or higher. Did not have lightheadedness or cp during these episodes. ECG here shows sinus rhythm with singlet pvc consists of LBBB and inferior axis. Sustained VT was triggered by a different PVCs.     Recurrent sustained monomorphic VT quite rapid > 250 bpm in patient with unknown PVCs. Unclear if sustained VT episodes are from same PVCs. Pt does have a family hx of SCD - his father  suddenly in his sleep in his 60's and was told to be from a MI. In light of the above findings it would be best for patient to be admitted directly to [facility] for evaluation as VT was quite rapid and potentially could be unstable hemodynamically if lasing longer.    Pt would benefit from echo, coronary angiogram and EP study with potential ablation if VT is considered idiopathic and easily inducible provided normal cardiac structure and function.    Today's clinic visit entailed:  The following tests were independently interpreted by me as noted in my documentation: ecg, zio  20 minutes spent on the date of the encounter doing chart review   Provider  Link to [facility] Help Grid     The level of medical decision making during this visit was of moderate complexity.      Orders Placed This Encounter   Procedures     EKG 12-lead complete w/read - Clinics (performed today)       No orders of the defined types were placed in this encounter.      There are no discontinued " medications.      Encounter Diagnosis   Name Primary?     Palpitations        CURRENT MEDICATIONS:  Current Outpatient Medications   Medication Sig Dispense Refill     aspirin 81 MG tablet Take 1 tablet by mouth daily. 90 tablet 3     escitalopram (LEXAPRO) 10 MG tablet Take 1 tablet (10 mg) by mouth daily 90 tablet 3     fish oil-omega-3 fatty acids (OMEGA 3) 1000 MG capsule Take 1 capsule by mouth 2 times daily. Lavazo fish oil 180 capsule 1     fluticasone (FLONASE) 50 MCG/ACT nasal spray Spray 1-2 sprays into both nostrils daily 1 Package 1     Multiple Vitamin (MULTI-VITAMIN) per tablet Take 1 tablet by mouth daily. 90 tablet 3     simvastatin (ZOCOR) 20 MG tablet TAKE ONE TABLET BY MOUTH EVERY NIGHT AT BEDTIME 90 tablet 3       ALLERGIES     Allergies   Allergen Reactions     Nkda [No Known Drug Allergies]        PAST MEDICAL HISTORY:  Past Medical History:   Diagnosis Date     Anxiety      Elevated fasting glucose      High triglycerides      DARLING (nonalcoholic steatohepatitis)     12/10      DANIELLE on CPAP      Strabismus      Thyroid nodule 2012    biopsy thyroglossal ductal cyst.  see Endo     Vitamin D deficiency        PAST SURGICAL HISTORY:  Past Surgical History:   Procedure Laterality Date     PE TUBES       TONSILLECTOMY & ADENOIDECTOMY       VASECTOMY         FAMILY HISTORY:  Family History   Problem Relation Age of Onset     C.A.D. Mother      Cancer Mother         brain tumor     Circulatory Father         sleep apnea,   in sleep 58     Gynecology Sister         x2       SOCIAL HISTORY:  Social History     Socioeconomic History     Marital status:      Spouse name: None     Number of children: 4     Years of education: None     Highest education level: None   Occupational History     None   Tobacco Use     Smoking status: Never Smoker     Smokeless tobacco: Never Used   Substance and Sexual Activity     Alcohol use: Yes     Alcohol/week: 0.0 standard drinks     Comment: very occ.      "Drug use: No     Sexual activity: Yes     Partners: Female     Birth control/protection: Surgical   Other Topics Concern     Parent/sibling w/ CABG, MI or angioplasty before 65F 55M? Yes   Social History Narrative     None     Social Determinants of Health     Financial Resource Strain: Not on file   Food Insecurity: Not on file   Transportation Needs: Not on file   Physical Activity: Not on file   Stress: Not on file   Social Connections: Not on file   Intimate Partner Violence: Not on file   Housing Stability: Not on file       Review of Systems:  Skin:  Negative       Eyes:  Negative      ENT:  Negative      Respiratory:  Negative       Cardiovascular:  Negative for;chest pain palpitations;Positive for    Gastroenterology: Negative      Genitourinary:  Negative      Musculoskeletal:  Negative      Neurologic:  Negative      Psychiatric:  Negative      Heme/Lymph/Imm:  Negative      Endocrine:  Negative        Physical Exam:  Vitals: BP (!) 192/89   Pulse 112   Ht 1.734 m (5' 8.27\")   Wt 108.4 kg (239 lb)   BMI 36.06 kg/m      Constitutional:  cooperative, alert and oriented, well developed, well nourished, in no acute distress        Skin:  warm and dry to the touch, no apparent skin lesions or masses noted          Head:  normocephalic, no masses or lesions        Eyes:  pupils equal and round, conjunctivae and lids unremarkable, sclera white, no xanthalasma, EOMS intact, no nystagmus        Lymph:No Cervical lymphadenopathy present     ENT:           Neck:  carotid pulses are full and equal bilaterally, JVP normal, no carotid bruit        Respiratory:  normal breath sounds, clear to auscultation, normal A-P diameter, normal symmetry, normal respiratory excursion, no use of accessory muscles         Cardiac: regular rhythm;normal S1 and S2 frequent premature beats              pulses full and equal, no bruits auscultated                                        GI:  abdomen soft, non-tender, BS normoactive, no " mass, no HSM, no bruits obese      Extremities and Muscular Skeletal:  no deformities, clubbing, cyanosis, erythema observed              Neurological:  no gross motor deficits        Psych:  Alert and Oriented x 3        CC  Uzair Fletcher Hutton MD  [address]  [place] [id number]

## 2021-12-09 NOTE — H&P
[facility]    History and Physical - Hospitalist Service       Date of Admission:  12/9/2021    Assessment & Plan      Neptali Ospina is a 51 year old male with past medical history significant for anxiety, dyslipidemia, DANIELLE, DARLING, and obesity admitted on 12/9/2021 with ventricular tachycardia.      Sustained Monomorphic Ventricular Tachycardia   The patient was initially seen in clinic on 11/16 with anxiety and palpitations. He was placed on a Zio patch at that time. The zio patch data was interpreted today and he was found to have 494 runs of VT with duration from 10 beats to over three minutes, with an average rate of ~250bmp. Dr. Hutton with EP saw the patient urgently in clinic today and recommended admission to the hospital for evaluation with ECHO, coronary angiogram and EP study. EKG here shows sinus rhythm with occasional PVCs.  - Cardiac monitoring  - Cardiology consulted   - TTE tomorrow   - NPO at midnight for possible angiogram     Anxiety   Pt reports a hx of severe anxiety, that is currently exacerbated by his current situation and hospitalization.   - Continue PTA escitalopram   - Lorazepam 0.5mg po q4h PRN for anxiety while in the hospital   - Psych consulted     Dyslipidemia  Low HDL and elevated triglycerides on recent testing   - Continue PTA ASA and simvastatin when verified     Elevated blood pressures  No previous diagnosis of HTN, not currently on any medications. BP elevated to 177/107 in ED. ?white coat hypertension given severe anxiety.   - Monitor     Obesity   DANIELLE  Body mass index is 36.49 kg/m .   - CPAP at night     Diet: Regular diet, NPO at midnight   DVT Prophylaxis: Pneumatic Compression Devices  Thomas Catheter: Not present  Central Lines: None  Code Status: Full Code     Disposition Plan   Expected Discharge: TBD  Anticipated discharge location:  Awaiting care coordination huddle    The patient's care was discussed with the Patient.    Jaja MOSS  [facility]  Securely message with the [employer] Web Console (learn more here)  Text page via [employer] Paging/Directory        ______________________________________________________________________    Chief Complaint   Palpitations     History is obtained from the patient    History of Present Illness   Neptali Ospina is a 51 year old male who was initially seen in clinic on 11/16 with anxiety and palpitations. He was placed on a Zio patch at that time. The zio patch data was interpreted today and he was found to have 494 runs of VT with duration from 10 beats to over three minutes, with an average rate of ~250bmp. Dr. Hutton with EP saw the patient urgently in clinic today and recommended admission to the hospital for urgent evaluation.     The patient is currently feeling ok other than being very anxious. He reports a long history of brief palpitations that have not been particularly symptomatic otherwise. He denies associated chest pain, shortness of breath or light-headedness. He has no known prior cardiac disease. He denies tobacco or significant alcohol use. He drinks one large espresso drink daily. No herbs or supplements. He works out several times per week and is very active.     Review of Systems    The 10 point Review of Systems is negative other than noted in the HPI or here.     Past Medical History    I have reviewed this patient's medical history and updated it with pertinent information if needed.   Past Medical History:   Diagnosis Date     Anxiety      Elevated fasting glucose      High triglycerides      DARLING (nonalcoholic steatohepatitis)     12/10      DANIELLE on CPAP      Strabismus      Thyroid nodule 2/2012    biopsy thyroglossal ductal cyst.  see Endo     Vitamin D deficiency        Past Surgical History   I have reviewed this patient's surgical history and updated it with pertinent information if needed.  Past Surgical History:   Procedure Laterality Date     PE  TUBES       TONSILLECTOMY & ADENOIDECTOMY       VASECTOMY         Social History   I have reviewed this patient's social history and updated it with pertinent information if needed.  Social History     Tobacco Use     Smoking status: Former Smoker     Smokeless tobacco: Never Used   Substance Use Topics     Alcohol use: Yes     Alcohol/week: 0.0 standard drinks     Comment: very occ.     Drug use: No       Family History   I have reviewed this patient's family history and updated it with pertinent information if needed.  Family History   Problem Relation Age of Onset     C.A.D. Mother      Cancer Mother         brain tumor     Circulatory Father         sleep apnea,   in sleep 58     Gynecology Sister         x2       Prior to Admission Medications   Prior to Admission Medications   Prescriptions Last Dose Informant Patient Reported? Taking?   Multiple Vitamin (MULTI-VITAMIN) per tablet   Yes No   Sig: Take 1 tablet by mouth daily.   aspirin 81 MG tablet   Yes No   Sig: Take 1 tablet by mouth daily.   escitalopram (LEXAPRO) 10 MG tablet   No No   Sig: Take 1 tablet (10 mg) by mouth daily   fish oil-omega-3 fatty acids (OMEGA 3) 1000 MG capsule   No No   Sig: Take 1 capsule by mouth 2 times daily. Lavazo fish oil   fluticasone (FLONASE) 50 MCG/ACT nasal spray   No No   Sig: Spray 1-2 sprays into both nostrils daily   simvastatin (ZOCOR) 20 MG tablet   No No   Sig: TAKE ONE TABLET BY MOUTH EVERY NIGHT AT BEDTIME      Facility-Administered Medications: None     Allergies   Allergies   Allergen Reactions     Nkda [No Known Drug Allergies]        Physical Exam   Vital Signs: Temp: 98.1  F (36.7  C) Temp src: Temporal BP: (!) 177/107 Pulse: 110   Resp: 18 SpO2: 97 % O2 Device: None (Room air)    Weight: 240 lbs 0 oz    Constitutional: Awake, alert, cooperative, no apparent distress.  Eyes: Conjunctiva and pupils examined and normal.  Respiratory: Clear to auscultation bilaterally, no crackles or  wheezing.  Cardiovascular: tachycardic and irregular, normal S1 and S2, and no murmur noted.  GI: Soft, non-distended, non-tender, normal bowel sounds.  Lymph/Hematologic: No anterior cervical or supraclavicular adenopathy.  Skin: No rashes, no cyanosis, no edema.  Musculoskeletal: No joint swelling, erythema or tenderness.  Neurologic: Cranial nerves 2-12 intact, normal strength and sensation.  Psychiatric: Alert, oriented to person, place and time, no obvious anxiety or depression.      Data   Data reviewed today: I reviewed all medications, new labs and imaging results over the last 24 hours. I personally reviewed the EKG tracing showing normal sinus rhythm with occasional PVCs .    Recent Labs   Lab 12/09/21  1321   WBC 10.3   HGB 15.1   MCV 88         POTASSIUM 3.8   CHLORIDE 107   CO2 26   BUN 18   CR 0.86   ANIONGAP 8   TD 9.4   *   ALBUMIN 4.0   PROTTOTAL 7.8   BILITOTAL 0.4   ALKPHOS 63   ALT 67   AST 32     No results found for this or any previous visit (from the past 24 hour(s)).

## 2021-12-09 NOTE — LETTER
"2021    [facility]  [address]  [place] [address]    RE: Neptali Ospina       Dear Colleague,     I had the pleasure of seeing Neptali Ospina in the [facility].  HPI and Plan:   Delightful but quite anxious patient with known history of PVCs but otherwise in excellent state of health on no meds. Exercises daily with weights lifting without any sob, cp, or palpitations. Recently, noted to have \"fluttering along with flushed sensation down in his thighs\" requested zio for documentation.     I was made aware of Zio finding of sustained fast VT by one of our RNs and asked the patient to see me on an urgent basis. Pt triggered multiple times for VT with duration from 10-15 beats to over 3 minutes long with avg rate of ~250 bpm or higher. Did not have lightheadedness or cp during these episodes. ECG here shows sinus rhythm with singlet pvc consists of LBBB and inferior axis. Sustained VT was triggered by a different PVCs.     Recurrent sustained monomorphic VT quite rapid > 250 bpm in patient with unknown PVCs. Unclear if sustained VT episodes are from same PVCs. Pt does have a family hx of SCD - his father  suddenly in his sleep in his 60's and was told to be from a MI. In light of the above findings it would be best for patient to be admitted directly to [facility] for evaluation as VT was quite rapid and potentially could be unstable hemodynamically if lasing longer.    Pt would benefit from echo, coronary angiogram and EP study with potential ablation if VT is considered idiopathic and easily inducible provided normal cardiac structure and function.    Today's clinic visit entailed:  The following tests were independently interpreted by me as noted in my documentation: ecg, zio  20 minutes spent on the date of the encounter doing chart review   Provider  Link to [facility] Help Grid     The level of medical decision making during this visit was of moderate " complexity.      Orders Placed This Encounter   Procedures     EKG 12-lead complete w/read - Clinics (performed today)       No orders of the defined types were placed in this encounter.      There are no discontinued medications.      Encounter Diagnosis   Name Primary?     Palpitations        CURRENT MEDICATIONS:  Current Outpatient Medications   Medication Sig Dispense Refill     aspirin 81 MG tablet Take 1 tablet by mouth daily. 90 tablet 3     escitalopram (LEXAPRO) 10 MG tablet Take 1 tablet (10 mg) by mouth daily 90 tablet 3     fish oil-omega-3 fatty acids (OMEGA 3) 1000 MG capsule Take 1 capsule by mouth 2 times daily. Lavazo fish oil 180 capsule 1     fluticasone (FLONASE) 50 MCG/ACT nasal spray Spray 1-2 sprays into both nostrils daily 1 Package 1     Multiple Vitamin (MULTI-VITAMIN) per tablet Take 1 tablet by mouth daily. 90 tablet 3     simvastatin (ZOCOR) 20 MG tablet TAKE ONE TABLET BY MOUTH EVERY NIGHT AT BEDTIME 90 tablet 3       ALLERGIES     Allergies   Allergen Reactions     Nkda [No Known Drug Allergies]        PAST MEDICAL HISTORY:  Past Medical History:   Diagnosis Date     Anxiety      Elevated fasting glucose      High triglycerides      DARLING (nonalcoholic steatohepatitis)     12/10      DANIELLE on CPAP      Strabismus      Thyroid nodule 2012    biopsy thyroglossal ductal cyst.  see Endo     Vitamin D deficiency        PAST SURGICAL HISTORY:  Past Surgical History:   Procedure Laterality Date     PE TUBES       TONSILLECTOMY & ADENOIDECTOMY       VASECTOMY         FAMILY HISTORY:  Family History   Problem Relation Age of Onset     C.A.D. Mother      Cancer Mother         brain tumor     Circulatory Father         sleep apnea,   in sleep 58     Gynecology Sister         x2       SOCIAL HISTORY:  Social History     Socioeconomic History     Marital status:      Spouse name: None     Number of children: 4     Years of education: None     Highest education level: None   Occupational  "History     None   Tobacco Use     Smoking status: Never Smoker     Smokeless tobacco: Never Used   Substance and Sexual Activity     Alcohol use: Yes     Alcohol/week: 0.0 standard drinks     Comment: very occ.     Drug use: No     Sexual activity: Yes     Partners: Female     Birth control/protection: Surgical   Other Topics Concern     Parent/sibling w/ CABG, MI or angioplasty before 65F 55M? Yes   Social History Narrative     None     Social Determinants of Health     Financial Resource Strain: Not on file   Food Insecurity: Not on file   Transportation Needs: Not on file   Physical Activity: Not on file   Stress: Not on file   Social Connections: Not on file   Intimate Partner Violence: Not on file   Housing Stability: Not on file       Review of Systems:  Skin:  Negative       Eyes:  Negative      ENT:  Negative      Respiratory:  Negative       Cardiovascular:  Negative for;chest pain palpitations;Positive for    Gastroenterology: Negative      Genitourinary:  Negative      Musculoskeletal:  Negative      Neurologic:  Negative      Psychiatric:  Negative      Heme/Lymph/Imm:  Negative      Endocrine:  Negative        Physical Exam:  Vitals: BP (!) 192/89   Pulse 112   Ht 1.734 m (5' 8.27\")   Wt 108.4 kg (239 lb)   BMI 36.06 kg/m      Constitutional:  cooperative, alert and oriented, well developed, well nourished, in no acute distress        Skin:  warm and dry to the touch, no apparent skin lesions or masses noted          Head:  normocephalic, no masses or lesions        Eyes:  pupils equal and round, conjunctivae and lids unremarkable, sclera white, no xanthalasma, EOMS intact, no nystagmus        Lymph:No Cervical lymphadenopathy present     ENT:           Neck:  carotid pulses are full and equal bilaterally, JVP normal, no carotid bruit        Respiratory:  normal breath sounds, clear to auscultation, normal A-P diameter, normal symmetry, normal respiratory excursion, no use of accessory muscles     "     Cardiac: regular rhythm;normal S1 and S2 frequent premature beats              pulses full and equal, no bruits auscultated                                        GI:  abdomen soft, non-tender, BS normoactive, no mass, no HSM, no bruits obese      Extremities and Muscular Skeletal:  no deformities, clubbing, cyanosis, erythema observed              Neurological:  no gross motor deficits        Psych:  Alert and Oriented x 3          Uzair Sherman MD   [facility]

## 2021-12-09 NOTE — ED TRIAGE NOTES
Pt sent to ED for admission for intermittent bouts of V tach. Pt finished ZIO patch and sent it in- which is how this was discovered. Pt denies any chest pain, SOB or palpitations at this time.

## 2021-12-09 NOTE — ED PROVIDER NOTES
"  History   Chief Complaint:  Palpitations       The history is provided by the patient.      Neptali Ospina is a 51 year old male with history of anxiety, DANIELLE, and hyperlipidemia who presents with palpitations. The patient was seen at his primary clinic 3 weeks ago and mentioned to his physician that he has been experiencing palpitations for the past couple years. He asked if he could wear a hear monitor to check his rhythms. He wore a monitor for 1 week and turned it in 2 weeks ago. Today the patient received a call from his cardiologist explaining that his results showed he had intermittent ventricular tachycardia and was referred to the ED for admission. Upon arrival he reports some minor light headedness with his palpitations, but denies any syncope or chest pain. In the past the patient's dad did pass away suddenly in his sleep for unknown reasons.     Review of Systems   Cardiovascular: Positive for palpitations. Negative for chest pain.   Neurological: Positive for light-headedness. Negative for syncope.   All other systems reviewed and are negative.        Allergies:  No Known Drug Allergies    Medications:  aspirin 81 mg  escitalopram   fish oil-omega-3 fatty acids   fluticasone   simvastatin     Past Medical History:     Anxiety  High triglycerides  DARLING (nonalcoholic steatohepatitis)  DANIELLE on CPAP  Strabismus  Vitamin D deficiency  Obesity  Left thyroid nodule    Adiposity  Hyperlipidemia    Past Surgical History:    PE tubes  Tonsillectomy & adenoidectomy  Vasectomy      Family History:    CAD  Brain cancer  Sleep apnea  Gynecology   Coronary artery disease  Hyperlipidemia  Hypertension  Diabetes type II    Social History:  Presents with wife.   PCP: Stephanie Fisher   Denies tobacco usage.     Physical Exam     Patient Vitals for the past 24 hrs:   BP Temp Temp src Pulse Resp SpO2 Height Weight   12/09/21 1246 (!) 177/107 98.1  F (36.7  C) Temporal 110 18 97 % 1.727 m (5' 8\") 108.9 kg (240 " lb)       Physical Exam    GENERAL: well developed, pleasant  HEAD: atraumatic  EYES: pupils reactive, extraocular muscles intact, conjunctivae normal  ENT:  mucus membranes moist  NECK:  trachea midline, normal range of motion  RESPIRATORY: no tachypnea, breath sounds clear to auscultation   CVS: normal S1/S2, no murmurs, intact distal pulses  ABDOMEN: soft, nontender, nondistention  MUSCULOSKELETAL: no deformities  SKIN: warm and dry, no acute rashes or ulceration  NEURO: GCS 15, cranial nerves intact, alert and oriented x3  PSYCH:  Mood/affect normal    Emergency Department Course   ECG  ECG obtained at 1257, ECG read at 1304  Sinus tachycardia with occasional premature ventricular complexes. Possible left atrial enlargement. Borderline ECG.   No prior ECG to compare.   Rate 106 bpm. NV interval 184 ms. QRS duration 68 ms. QT/QTc 336/446 ms. P-R-T axes 41 25 54.     Laboratory:  Labs Ordered and Resulted from Time of ED Arrival to Time of ED Departure   CBC WITH PLATELETS AND DIFFERENTIAL - Abnormal       Result Value    WBC Count 10.3      RBC Count 5.08      Hemoglobin 15.1      Hematocrit 44.9      MCV 88      MCH 29.7      MCHC 33.6      RDW 12.2      Platelet Count 178      % Neutrophils 83      % Lymphocytes 10      % Monocytes 6      % Eosinophils 0      % Basophils 0      % Immature Granulocytes 1      NRBCs per 100 WBC 0      Absolute Neutrophils 8.6 (*)     Absolute Lymphocytes 1.1      Absolute Monocytes 0.6      Absolute Eosinophils 0.0      Absolute Basophils 0.0      Absolute Immature Granulocytes 0.1      Absolute NRBCs 0.0     COMPREHENSIVE METABOLIC PANEL   TROPONIN I   MAGNESIUM   TSH WITH FREE T4 REFLEX   COVID-19 VIRUS (CORONAVIRUS) BY PCR       Emergency Department Course:    Reviewed:  I reviewed nursing notes, vitals, past medical history and Care Everywhere    Assessments:  1306 I obtained history and examined the patient as noted above.    I rechecked the patient and explained findings.      Consults:  4596 I consulted with Dr. Sanchez of the hospitalist services who is in agreement to accept the patient for admission.     Disposition:  The patient was admitted to the hospital under the care of Dr. Sanchez.     Impression & Plan   CMS Diagnoses:       Medical Decision Making:  Presents with intermittent palpitations that sounds have been longstanding.  He recently wore out monitor and the monitor results show intermittent VT several beats long but nothing sustained.  He did see cardiology today and given his history and family history of sudden death work-up was ensued.  He was directed here for admission.  He has had a few beats here of for 5 beats of nonsustained monomorphic VT.  Spoke with the hospitalist regarding admission.      Diagnosis:    ICD-10-CM    1. Paroxysmal ventricular tachycardia (H)  I47.2        Scribe Disclosure:  I, Sanaz Hinton, am serving as a scribe at 1:05 PM on 12/9/2021 to document services personally performed by Leonard Beckham MD based on my observations and the provider's statements to me.            Leonard Beckham MD  12/09/21 1057

## 2021-12-09 NOTE — ED NOTES
[facility]  ED Nurse Handoff Report    ED Chief complaint: Palpitations      ED Diagnosis:   Final diagnoses:   Paroxysmal ventricular tachycardia (H)       Code Status: not discussed by ED RN     Allergies:   Allergies   Allergen Reactions     Nkda [No Known Drug Allergies]        Patient Story: 51M sent in after having a zio patch a month ago that showed pt going into runs of vtach  Focused Assessment:  Pt A&Ox4. No CP. NS on the monitor with occasional 4-8 beats of vtach.     Treatments and/or interventions provided:   Patient's response to treatments and/or interventions:     To be done/followed up on inpatient unit:      Does this patient have any cognitive concerns?: N/A    Activity level - Baseline/Home:  Independent  Activity Level - Current:   Independent    Patient's Preferred language: English   Needed?: No    Isolation: None  Infection: Not Applicable    Patient tested for COVID 19 prior to admission: YES  Bariatric?: No    Vital Signs:   Vitals:    12/09/21 1345 12/09/21 1400 12/09/21 1430 12/09/21 1445   BP:  134/89 (!) 145/85    Pulse: 100 103 99 88   Resp: 14 10 16 15   Temp:       TempSrc:       SpO2: 92% 95% 94% 94%   Weight:       Height:           Cardiac Rhythm:     Was the PSS-3 completed:   Yes  What interventions are required if any?               Family Comments: wife at bedside  OBS brochure/video discussed/provided to patient/family: N/A              Name of person given brochure if not patient:               Relationship to patient:     For the majority of the shift this patient's behavior was Green.   Behavioral interventions performed were .    ED NURSE PHONE NUMBER: [phone]

## 2021-12-09 NOTE — PHARMACY-ADMISSION MEDICATION HISTORY
Pharmacy Medication History  Admission medication history interview status for the 12/9/2021  admission is complete. See EPIC admission navigator for prior to admission medications     Location of Interview: Patient room  Medication history sources: Patient, spouse, and outside med report    Significant changes made to the medication list:  None    In the past week, patient estimated taking medication this percent of the time: greater than 90%    Additional medication history information:   None    Medication reconciliation completed by provider prior to medication history? No    Time spent in this activity: 20 mins    Prior to Admission medications    Medication Sig Last Dose Taking? Auth Provider   aspirin 81 MG tablet Take 1 tablet by mouth daily. 12/9/2021 at am Yes Beth Alvarado MD   escitalopram (LEXAPRO) 10 MG tablet Take 1 tablet (10 mg) by mouth daily 12/8/2021 at pm Yes Padmini Thompson PA-C   fish oil-omega-3 fatty acids (OMEGA 3) 1000 MG capsule Take 1 capsule by mouth 2 times daily. Lavazo fish oil 12/9/2021 at am Yes Beth Alvarado MD   fluticasone (FLONASE) 50 MCG/ACT nasal spray Spray 1-2 sprays into both nostrils daily  Patient taking differently: Spray 1-2 sprays into both nostrils daily as needed  prn at prn Yes Mary Stewart MD   Magnesium Oxide 200mg Take 1 tablet by mouth daily. 12/9/2021 at am Yes    Multiple Vitamin (MULTI-VITAMIN) per tablet Take 1 tablet by mouth daily. 12/9/2021 at am Yes Beth Alvarado MD   simvastatin (ZOCOR) 20 MG tablet TAKE ONE TABLET BY MOUTH EVERY NIGHT AT BEDTIME 12/8/2021 at pm Yes Padmini Thompson PA-C       The information provided in this note is only as accurate as the sources available at the time of update(s)

## 2021-12-10 ENCOUNTER — APPOINTMENT (OUTPATIENT)
Dept: CARDIOLOGY | Facility: CLINIC | Age: 51
DRG: 274 | End: 2021-12-10
Attending: STUDENT IN AN ORGANIZED HEALTH CARE EDUCATION/TRAINING PROGRAM
Payer: COMMERCIAL

## 2021-12-10 LAB
CREAT SERPL-MCNC: 0.93 MG/DL (ref 0.66–1.25)
GFR SERPL CREATININE-BSD FRML MDRD: >90 ML/MIN/1.73M2
LVEF ECHO: NORMAL

## 2021-12-10 PROCEDURE — B2111ZZ FLUOROSCOPY OF MULTIPLE CORONARY ARTERIES USING LOW OSMOLAR CONTRAST: ICD-10-PCS | Performed by: INTERNAL MEDICINE

## 2021-12-10 PROCEDURE — 258N000003 HC RX IP 258 OP 636: Performed by: STUDENT IN AN ORGANIZED HEALTH CARE EDUCATION/TRAINING PROGRAM

## 2021-12-10 PROCEDURE — 250N000011 HC RX IP 250 OP 636: Performed by: INTERNAL MEDICINE

## 2021-12-10 PROCEDURE — 99222 1ST HOSP IP/OBS MODERATE 55: CPT | Mod: 25 | Performed by: INTERNAL MEDICINE

## 2021-12-10 PROCEDURE — 99222 1ST HOSP IP/OBS MODERATE 55: CPT | Performed by: NURSE PRACTITIONER

## 2021-12-10 PROCEDURE — C1760 CLOSURE DEV, VASC: HCPCS | Performed by: INTERNAL MEDICINE

## 2021-12-10 PROCEDURE — 999N000208 ECHOCARDIOGRAM COMPLETE

## 2021-12-10 PROCEDURE — 99232 SBSQ HOSP IP/OBS MODERATE 35: CPT | Mod: 25 | Performed by: INTERNAL MEDICINE

## 2021-12-10 PROCEDURE — 82565 ASSAY OF CREATININE: CPT | Performed by: INTERNAL MEDICINE

## 2021-12-10 PROCEDURE — 250N000009 HC RX 250: Performed by: INTERNAL MEDICINE

## 2021-12-10 PROCEDURE — 93454 CORONARY ARTERY ANGIO S&I: CPT | Mod: 26 | Performed by: INTERNAL MEDICINE

## 2021-12-10 PROCEDURE — 99152 MOD SED SAME PHYS/QHP 5/>YRS: CPT | Performed by: INTERNAL MEDICINE

## 2021-12-10 PROCEDURE — 250N000013 HC RX MED GY IP 250 OP 250 PS 637: Performed by: STUDENT IN AN ORGANIZED HEALTH CARE EDUCATION/TRAINING PROGRAM

## 2021-12-10 PROCEDURE — 255N000002 HC RX 255 OP 636: Performed by: STUDENT IN AN ORGANIZED HEALTH CARE EDUCATION/TRAINING PROGRAM

## 2021-12-10 PROCEDURE — 210N000002 HC R&B HEART CARE

## 2021-12-10 PROCEDURE — 93306 TTE W/DOPPLER COMPLETE: CPT | Mod: 26 | Performed by: INTERNAL MEDICINE

## 2021-12-10 PROCEDURE — 272N000001 HC OR GENERAL SUPPLY STERILE: Performed by: INTERNAL MEDICINE

## 2021-12-10 PROCEDURE — 250N000013 HC RX MED GY IP 250 OP 250 PS 637: Performed by: INTERNAL MEDICINE

## 2021-12-10 PROCEDURE — 93454 CORONARY ARTERY ANGIO S&I: CPT | Performed by: INTERNAL MEDICINE

## 2021-12-10 PROCEDURE — 99232 SBSQ HOSP IP/OBS MODERATE 35: CPT | Performed by: INTERNAL MEDICINE

## 2021-12-10 PROCEDURE — 99207 PR CONSULT E&M CHANGED TO INITIAL LEVEL: CPT | Performed by: NURSE PRACTITIONER

## 2021-12-10 PROCEDURE — C1894 INTRO/SHEATH, NON-LASER: HCPCS | Performed by: INTERNAL MEDICINE

## 2021-12-10 PROCEDURE — 36415 COLL VENOUS BLD VENIPUNCTURE: CPT | Performed by: INTERNAL MEDICINE

## 2021-12-10 PROCEDURE — 250N000013 HC RX MED GY IP 250 OP 250 PS 637: Performed by: NURSE PRACTITIONER

## 2021-12-10 DEVICE — CLOSURE ANGIOSEAL 6FR 610130: Type: IMPLANTABLE DEVICE | Status: FUNCTIONAL

## 2021-12-10 RX ORDER — LIDOCAINE 40 MG/G
CREAM TOPICAL
Status: DISCONTINUED | OUTPATIENT
Start: 2021-12-10 | End: 2021-12-10

## 2021-12-10 RX ORDER — FLUMAZENIL 0.1 MG/ML
0.2 INJECTION, SOLUTION INTRAVENOUS
Status: ACTIVE | OUTPATIENT
Start: 2021-12-10 | End: 2021-12-10

## 2021-12-10 RX ORDER — NALOXONE HYDROCHLORIDE 0.4 MG/ML
0.2 INJECTION, SOLUTION INTRAMUSCULAR; INTRAVENOUS; SUBCUTANEOUS
Status: DISCONTINUED | OUTPATIENT
Start: 2021-12-10 | End: 2021-12-15 | Stop reason: HOSPADM

## 2021-12-10 RX ORDER — NALOXONE HYDROCHLORIDE 0.4 MG/ML
0.4 INJECTION, SOLUTION INTRAMUSCULAR; INTRAVENOUS; SUBCUTANEOUS
Status: DISCONTINUED | OUTPATIENT
Start: 2021-12-10 | End: 2021-12-15 | Stop reason: HOSPADM

## 2021-12-10 RX ORDER — LIDOCAINE 40 MG/G
CREAM TOPICAL
Status: DISCONTINUED | OUTPATIENT
Start: 2021-12-10 | End: 2021-12-13

## 2021-12-10 RX ORDER — LISINOPRIL 5 MG/1
5 TABLET ORAL DAILY
Status: DISCONTINUED | OUTPATIENT
Start: 2021-12-10 | End: 2021-12-15

## 2021-12-10 RX ORDER — LORAZEPAM 0.5 MG/1
0.5 TABLET ORAL
Status: DISCONTINUED | OUTPATIENT
Start: 2021-12-10 | End: 2021-12-10 | Stop reason: HOSPADM

## 2021-12-10 RX ORDER — ASPIRIN 81 MG/1
81 TABLET ORAL DAILY
Status: DISCONTINUED | OUTPATIENT
Start: 2021-12-10 | End: 2021-12-11

## 2021-12-10 RX ORDER — OXYCODONE HYDROCHLORIDE 5 MG/1
5 TABLET ORAL EVERY 4 HOURS PRN
Status: DISCONTINUED | OUTPATIENT
Start: 2021-12-10 | End: 2021-12-15 | Stop reason: HOSPADM

## 2021-12-10 RX ORDER — NALOXONE HYDROCHLORIDE 0.4 MG/ML
0.2 INJECTION, SOLUTION INTRAMUSCULAR; INTRAVENOUS; SUBCUTANEOUS
Status: DISCONTINUED | OUTPATIENT
Start: 2021-12-10 | End: 2021-12-10

## 2021-12-10 RX ORDER — ASPIRIN 81 MG/1
243 TABLET, CHEWABLE ORAL ONCE
Status: COMPLETED | OUTPATIENT
Start: 2021-12-10 | End: 2021-12-10

## 2021-12-10 RX ORDER — SODIUM CHLORIDE 9 MG/ML
INJECTION, SOLUTION INTRAVENOUS CONTINUOUS
Status: DISCONTINUED | OUTPATIENT
Start: 2021-12-10 | End: 2021-12-10 | Stop reason: HOSPADM

## 2021-12-10 RX ORDER — POTASSIUM CHLORIDE 1500 MG/1
20 TABLET, EXTENDED RELEASE ORAL
Status: COMPLETED | OUTPATIENT
Start: 2021-12-10 | End: 2021-12-10

## 2021-12-10 RX ORDER — FENTANYL CITRATE 50 UG/ML
25 INJECTION, SOLUTION INTRAMUSCULAR; INTRAVENOUS
Status: DISCONTINUED | OUTPATIENT
Start: 2021-12-10 | End: 2021-12-15 | Stop reason: HOSPADM

## 2021-12-10 RX ORDER — CARVEDILOL 3.12 MG/1
3.12 TABLET ORAL 2 TIMES DAILY WITH MEALS
Status: DISCONTINUED | OUTPATIENT
Start: 2021-12-10 | End: 2021-12-10

## 2021-12-10 RX ORDER — IOPAMIDOL 755 MG/ML
INJECTION, SOLUTION INTRAVASCULAR
Status: DISCONTINUED | OUTPATIENT
Start: 2021-12-10 | End: 2021-12-10 | Stop reason: HOSPADM

## 2021-12-10 RX ORDER — ATROPINE SULFATE 0.1 MG/ML
0.5 INJECTION INTRAVENOUS
Status: ACTIVE | OUTPATIENT
Start: 2021-12-10 | End: 2021-12-10

## 2021-12-10 RX ORDER — SODIUM CHLORIDE 9 MG/ML
75 INJECTION, SOLUTION INTRAVENOUS CONTINUOUS
Status: ACTIVE | OUTPATIENT
Start: 2021-12-10 | End: 2021-12-10

## 2021-12-10 RX ORDER — LISINOPRIL 10 MG/1
10 TABLET ORAL DAILY
Status: DISCONTINUED | OUTPATIENT
Start: 2021-12-10 | End: 2021-12-10

## 2021-12-10 RX ORDER — HEPARIN SODIUM 10000 [USP'U]/100ML
100-1000 INJECTION, SOLUTION INTRAVENOUS CONTINUOUS PRN
Status: DISCONTINUED | OUTPATIENT
Start: 2021-12-10 | End: 2021-12-10 | Stop reason: HOSPADM

## 2021-12-10 RX ORDER — ASPIRIN 325 MG
325 TABLET ORAL ONCE
Status: DISCONTINUED | OUTPATIENT
Start: 2021-12-10 | End: 2021-12-10

## 2021-12-10 RX ORDER — ACETAMINOPHEN 325 MG/1
650 TABLET ORAL EVERY 4 HOURS PRN
Status: DISCONTINUED | OUTPATIENT
Start: 2021-12-10 | End: 2021-12-15 | Stop reason: HOSPADM

## 2021-12-10 RX ORDER — NALOXONE HYDROCHLORIDE 0.4 MG/ML
0.4 INJECTION, SOLUTION INTRAMUSCULAR; INTRAVENOUS; SUBCUTANEOUS
Status: DISCONTINUED | OUTPATIENT
Start: 2021-12-10 | End: 2021-12-10

## 2021-12-10 RX ORDER — LORAZEPAM 2 MG/ML
0.5 INJECTION INTRAMUSCULAR
Status: DISCONTINUED | OUTPATIENT
Start: 2021-12-10 | End: 2021-12-10 | Stop reason: HOSPADM

## 2021-12-10 RX ORDER — SODIUM CHLORIDE 9 MG/ML
INJECTION, SOLUTION INTRAVENOUS CONTINUOUS
Status: DISCONTINUED | OUTPATIENT
Start: 2021-12-10 | End: 2021-12-15 | Stop reason: HOSPADM

## 2021-12-10 RX ORDER — FENTANYL CITRATE 50 UG/ML
INJECTION, SOLUTION INTRAMUSCULAR; INTRAVENOUS
Status: DISCONTINUED | OUTPATIENT
Start: 2021-12-10 | End: 2021-12-10 | Stop reason: HOSPADM

## 2021-12-10 RX ORDER — OXYCODONE HYDROCHLORIDE 5 MG/1
10 TABLET ORAL EVERY 4 HOURS PRN
Status: DISCONTINUED | OUTPATIENT
Start: 2021-12-10 | End: 2021-12-15 | Stop reason: HOSPADM

## 2021-12-10 RX ADMIN — LORAZEPAM 0.5 MG: 0.5 TABLET ORAL at 12:48

## 2021-12-10 RX ADMIN — SIMVASTATIN 20 MG: 20 TABLET, FILM COATED ORAL at 20:25

## 2021-12-10 RX ADMIN — SODIUM CHLORIDE: 9 INJECTION, SOLUTION INTRAVENOUS at 11:27

## 2021-12-10 RX ADMIN — ASPIRIN 81 MG: 81 TABLET, COATED ORAL at 11:29

## 2021-12-10 RX ADMIN — HUMAN ALBUMIN MICROSPHERES AND PERFLUTREN 9 ML: 10; .22 INJECTION, SOLUTION INTRAVENOUS at 11:10

## 2021-12-10 RX ADMIN — POTASSIUM CHLORIDE 20 MEQ: 1500 TABLET, EXTENDED RELEASE ORAL at 12:41

## 2021-12-10 RX ADMIN — LISINOPRIL 5 MG: 5 TABLET ORAL at 13:54

## 2021-12-10 RX ADMIN — ESCITALOPRAM 15 MG: 5 TABLET, FILM COATED ORAL at 20:25

## 2021-12-10 RX ADMIN — ASPIRIN 81 MG CHEWABLE TABLET 243 MG: 81 TABLET CHEWABLE at 12:41

## 2021-12-10 ASSESSMENT — ACTIVITIES OF DAILY LIVING (ADL)
ADLS_ACUITY_SCORE: 4

## 2021-12-10 NOTE — PROGRESS NOTES
"Cardiology Progress Note   Date of service: 12/10/21       Assessment and Plan:     1. Paroxysmal rapid VT    Saw Dr. Hutton who recommended inpatient eval, echo, cath and possible EP study if cath unrevealing for source of VT.    Hopefully cath today and possible EP study Monday. Dr. Saavedra notified.                   Interval History:   Discussed his visit yesterday with Dr. Hutton regarding VT and ischemic eval              Review of Systems:   As per subjective, otherwise 5 systems reviewed and negative.           Physical Exam:     Vitals were reviewed  Blood pressure (!) 153/99, pulse 84, temperature 98.1  F (36.7  C), temperature source Oral, resp. rate 8, height 1.727 m (5' 8\"), weight 108.9 kg (240 lb), SpO2 96 %.  Temperatures:  Current - Temp: 98.1  F (36.7  C); Max - Temp  Av.1  F (36.7  C)  Min: 98.1  F (36.7  C)  Max: 98.1  F (36.7  C)  Respiration range: Resp  Av.8  Min: 0  Max: 20  Pulse range: Pulse  Av.9  Min: 61  Max: 112  Blood pressure range: Systolic (24hrs), Av , Min:123 , Max:192   ; Diastolic (24hrs), Av, Min:73, Max:110    Pulse oximetry range: SpO2  Av.4 %  Min: 89 %  Max: 97 %    Intake/Output Summary (Last 24 hours) at 12/10/2021 1046  Last data filed at 2021 1900  Gross per 24 hour   Intake 200 ml   Output --   Net 200 ml       Constitutional:   awake, alert, cooperative, no apparent distress, and appears stated age     Eyes:   Lids and lashes normal, pupils equal, round and reactive to light, extra ocular muscles intact, sclera clear, conjunctiva normal     Neck:   supple, symmetrical, trachea midline, no JVD     Back:   symmetric     Lungs:   symmetric     Cardiovascular:   RRR     Abdomen:   benign     Musculoskeletal:   motor strength is 5 out of 5 all extremities bilaterally     Neurologic:   Grossly nonfocal     Skin:   normal skin color, texture, turgor                Medications:     Current Facility-Administered Medications Ordered in Epic   Medication " Dose Route Frequency Last Rate Last Admin     aspirin EC tablet 81 mg  81 mg Oral Daily         escitalopram (LEXAPRO) tablet 10 mg  10 mg Oral QPM   10 mg at 12/09/21 2141     lidocaine (LMX4) cream   Topical Q1H PRN         lidocaine 1 % 0.1-1 mL  0.1-1 mL Other Q1H PRN         LORazepam (ATIVAN) tablet 0.5 mg  0.5 mg Oral Q4H PRN   0.5 mg at 12/09/21 1609     melatonin tablet 1 mg  1 mg Oral At Bedtime PRN         ondansetron (ZOFRAN-ODT) ODT tab 4 mg  4 mg Oral Q6H PRN        Or     ondansetron (ZOFRAN) injection 4 mg  4 mg Intravenous Q6H PRN         simvastatin (ZOCOR) tablet 20 mg  20 mg Oral At Bedtime   20 mg at 12/09/21 2141     sodium chloride (PF) 0.9% PF flush 3 mL  3 mL Intracatheter Q8H         sodium chloride (PF) 0.9% PF flush 3 mL  3 mL Intracatheter q1 min prn         sodium chloride 0.9% infusion   Intravenous Continuous         No current Epic-ordered outpatient medications on file.                Data:   All laboratory data reviewed  Results for orders placed or performed during the hospital encounter of 12/09/21 (from the past 24 hour(s))   EKG 12-lead, tracing only   Result Value Ref Range    Systolic Blood Pressure  mmHg    Diastolic Blood Pressure  mmHg    Ventricular Rate 106 BPM    Atrial Rate 106 BPM    NM Interval 184 ms    QRS Duration 68 ms     ms    QTc 446 ms    P Axis 41 degrees    R AXIS 25 degrees    T Axis 54 degrees    Interpretation ECG       Sinus tachycardia with occasional Premature ventricular complexes  Possible Left atrial enlargement  Borderline ECG  No previous ECGs available  Confirmed by GENERATED REPORT, COMPUTER ([id number]),  DEIRDRE DAVISON ([id number]) on 12/9/2021 1:50:48 PM     CBC with platelets differential    Narrative    The following orders were created for panel order CBC with platelets differential.  Procedure                               Abnormality         Status                     ---------                               -----------         ------                      CBC with platelets and d...[[id number]]  Abnormal            Final result                 Please view results for these tests on the individual orders.   Comprehensive metabolic panel   Result Value Ref Range    Sodium 141 133 - 144 mmol/L    Potassium 3.8 3.4 - 5.3 mmol/L    Chloride 107 94 - 109 mmol/L    Carbon Dioxide (CO2) 26 20 - 32 mmol/L    Anion Gap 8 3 - 14 mmol/L    Urea Nitrogen 18 7 - 30 mg/dL    Creatinine 0.86 0.66 - 1.25 mg/dL    Calcium 9.4 8.5 - 10.1 mg/dL    Glucose 124 (H) 70 - 99 mg/dL    Alkaline Phosphatase 63 40 - 150 U/L    AST 32 0 - 45 U/L    ALT 67 0 - 70 U/L    Protein Total 7.8 6.8 - 8.8 g/dL    Albumin 4.0 3.4 - 5.0 g/dL    Bilirubin Total 0.4 0.2 - 1.3 mg/dL    GFR Estimate >90 >60 mL/min/1.73m2   Troponin I   Result Value Ref Range    Troponin I High Sensitivity 6 <79 ng/L   Magnesium   Result Value Ref Range    Magnesium 2.0 1.6 - 2.3 mg/dL   TSH with free T4 reflex   Result Value Ref Range    TSH 2.38 0.40 - 4.00 mU/L   CBC with platelets and differential   Result Value Ref Range    WBC Count 10.3 4.0 - 11.0 10e3/uL    RBC Count 5.08 4.40 - 5.90 10e6/uL    Hemoglobin 15.1 13.3 - 17.7 g/dL    Hematocrit 44.9 40.0 - 53.0 %    MCV 88 78 - 100 fL    MCH 29.7 26.5 - 33.0 pg    MCHC 33.6 31.5 - 36.5 g/dL    RDW 12.2 10.0 - 15.0 %    Platelet Count 178 150 - 450 10e3/uL    % Neutrophils 83 %    % Lymphocytes 10 %    % Monocytes 6 %    % Eosinophils 0 %    % Basophils 0 %    % Immature Granulocytes 1 %    NRBCs per 100 WBC 0 <1 /100    Absolute Neutrophils 8.6 (H) 1.6 - 8.3 10e3/uL    Absolute Lymphocytes 1.1 0.8 - 5.3 10e3/uL    Absolute Monocytes 0.6 0.0 - 1.3 10e3/uL    Absolute Eosinophils 0.0 0.0 - 0.7 10e3/uL    Absolute Basophils 0.0 0.0 - 0.2 10e3/uL    Absolute Immature Granulocytes 0.1 <=0.4 10e3/uL    Absolute NRBCs 0.0 10e3/uL   Asymptomatic COVID-19 Virus (Coronavirus) by PCR Nasopharyngeal    Specimen: Nasopharyngeal; Swab   Result Value Ref Range    SARS CoV2  PCR Negative Negative    Narrative    Testing was performed using the gaye  SARS-CoV-2 & Influenza A/B Assay on the gaye  [employer]illian  System.  This test should be ordered for the detection of SARS-COV-2 in individuals who meet SARS-CoV-2 clinical and/or epidemiological criteria. Test performance is unknown in asymptomatic patients.  This test is for in vitro diagnostic use under the FDA EUA for laboratories certified under CLIA to perform moderate and/or high complexity testing. This test has not been FDA cleared or approved.  A negative test does not rule out the presence of PCR inhibitors in the specimen or target RNA in concentration below the limit of detection for the assay. The possibility of a false negative should be considered if the patient's recent exposure or clinical presentation suggests COVID-19.  [facility] are certified under the Clinical Laboratory Improvement Amendments of 1988 (CLIA-88) as qualified to perform moderate and/or high complexity laboratory testing.       All laboratory data reviewed  Lab Results   Component Value Date    CHOL 183 11/16/2021    CHOL 180 11/02/2020     Lab Results   Component Value Date    HDL 34 11/16/2021    HDL 37 11/02/2020     Lab Results   Component Value Date    LDL 96 11/16/2021    LDL 96 11/02/2020     Lab Results   Component Value Date    TRIG 267 11/16/2021    TRIG 235 11/02/2020     Lab Results   Component Value Date    CHOLHDLRATIO 3.1 03/09/2015     TSH   Date Value Ref Range Status   12/09/2021 2.38 0.40 - 4.00 mU/L Final   11/02/2020 2.70 0.40 - 4.00 mU/L Final     Last Basic Metabolic Panel:  Lab Results   Component Value Date     12/09/2021     11/02/2020      Lab Results   Component Value Date    POTASSIUM 3.8 12/09/2021    POTASSIUM 4.1 11/02/2020     Lab Results   Component Value Date    CHLORIDE 107 12/09/2021    CHLORIDE 103 11/02/2020     Lab Results   Component Value Date    TD 9.4 12/09/2021    TD 9.3 11/02/2020      Lab Results   Component Value Date    CO2 26 12/09/2021    CO2 31 11/02/2020     Lab Results   Component Value Date    BUN 18 12/09/2021    BUN 13 11/02/2020     Lab Results   Component Value Date    CR 0.86 12/09/2021    CR 0.87 11/02/2020     Lab Results   Component Value Date     12/09/2021    GLC 99 11/02/2020     Lab Results   Component Value Date    WBC 10.3 12/09/2021    WBC 6.6 04/20/2016     Lab Results   Component Value Date    RBC 5.08 12/09/2021    RBC 5.01 04/20/2016     Lab Results   Component Value Date    HGB 15.1 12/09/2021    HGB 14.9 04/20/2016     Lab Results   Component Value Date    HCT 44.9 12/09/2021    HCT 44.8 04/20/2016     Lab Results   Component Value Date    MCV 88 12/09/2021    MCV 89 04/20/2016     Lab Results   Component Value Date    MCH 29.7 12/09/2021    MCH 29.7 04/20/2016     Lab Results   Component Value Date    MCHC 33.6 12/09/2021    MCHC 33.3 04/20/2016     Lab Results   Component Value Date    RDW 12.2 12/09/2021    RDW 12.4 04/20/2016     Lab Results   Component Value Date     12/09/2021     04/20/2016

## 2021-12-10 NOTE — PROGRESS NOTES
RECEIVING UNIT ED HANDOFF REVIEW    ED Nurse Handoff Report was reviewed by: Olimpia Diana RN on December 10, 2021 at 8:11 AM          clear

## 2021-12-10 NOTE — CONSULTS
Consult Date: 12/10/2021    REFERRING PHYSICIAN:  Dr. Hutton.    REASON FOR CONSULTATION:  Ventricular tachycardia.    HISTORY OF PRESENT ILLNESS:  Mr. Ospina is a 51-year-old  male who has had history of chest fluttering intermittently for years.  He did not pay much attention because he was short-lasting for seconds.  The symptoms are mostly triggered by mental distress or physical exertion.  He has not had syncope or near syncope.  The patient was recently put on a Zio Patch monitor because of the above symptoms.  The Zio Patch monitor detected frequent PVCs and many episodes of fast VT with spontaneous termination.  Surprisingly, the patient did not have major symptoms during the fast VT.  After Cardiology received the Zio Patch result, the patient was called in for an urgent clinic visit with Dr. Hutton yesterday.  He was subsequently sent to the ER for admission.  After the admission, he continues to have short runs of VT and frequent PVCs.  Symptomatically, he has no discomfort at the present time.    PAST MEDICAL HISTORY:  Remarkable for obstructive sleep apnea.    FAMILY HISTORY:  His father  during his sleep suddenly.    REVIEW OF SYSTEMS:  A comprehensive review of the systems showed no fever, cough or diarrhea.    SOCIAL HISTORY:  He does not smoke and drinks alcohol only occasionally.    MEDICATIONS:  Aspirin 81 mg p.o. daily, Lexapro 10 mg p.o. daily, simvastatin 20 mg p.o. daily.    ALLERGIES:  NONE.    PHYSICAL EXAMINATION:    GENERAL:  He is alert and oriented with no acute distress.  VITAL SIGNS:  Blood pressure 153/99, heart rate 84 beats per minute, temperature 98.1 degrees, oxygen 96% on room air.   HEENT:  The eyes and ENT were unremarkable.  SKIN:  There was no skin rash or lymph node enlargement.  NECK:  Jugular vein was not elevated.  LUNGS:  Clear.  HEART:  Rhythm was regular.  Heart sounds were normal without murmur.  ABDOMEN:  Moderate obesity.  EXTREMITIES:  There was no pedal  edema.  NEUROLOGIC:  Showed no focal deficit.    ASSESSMENT AND RECOMMENDATIONS:  Mr. Anderson is a 51-year-old male who had frequent premature ventricular contractions and nonsustained ventricular tachycardia as well as long-lasting, short-terminating, sustained ventricular tachycardia.  He has not had syncope or near syncope.  I reviewed the bedside echo, which appeared to be normal.  At this point, I strongly suspect RV outflow tract VT.  I agree for coronary angiography to rule out coronary artery disease.  For management of the ventricular arrhythmia, I would avoid any beta blocker or antiarrhythmic drug for the time being.  The plan is to proceed with EP study and possible VT ablation next Monday.  If the arrhythmia is not ablatable, I probably would consider antiarrhythmic drug instead of ICD implantation at this point.    Zhen Saavedra MD        D: 12/10/2021   T: 12/10/2021   MT: [id number]    Name:     JOSE ANDERSON  MRN:      [id number]        Account:      [id number]   :      1970           Consult Date: 12/10/2021     Document: [id number]    cc:  Uzair Hutton MD

## 2021-12-10 NOTE — PLAN OF CARE
Neuro- A&OX4,sever anxiety   Most Recent Vitals- Temp: 98.1  F (36.7  C) Temp src: Oral BP: 122/71 Pulse: 78   Resp: 16 SpO2: 90 % O2 Device: None (Room air)   Tele/Cardiac- SR/ST HR   Resp- RA   Activity- Baseline independent, had coronary angiogram today bed rest until 5PM   Pain- Denies   Drips- none   Drains/Tubes- P-V   Skin- Right groin angio site CDI  GI/- WDL   Aggression Color- Green  COVID status- Negative  Plan- EP study and C-MRI on Monday   [facility] [place]     Olimpia Diana RN

## 2021-12-10 NOTE — PROGRESS NOTES
51 year-old  male, chest fluttering for years. Short lasting for seconds, mostly triggered by mental stress or physical exertion. No syncope or near syncope.  Ziopatch documented frequent PVCs and fast spontaneous terminating VT.  Father had sudden death during sleep.  No other chronic medical conditions other than DANIELLE.    Agree for coronary angiography today.  Plan for EP study and VT ablation next Monday.  Please do not give beta blocker or other antiarrhythmic drug over the weekend.

## 2021-12-10 NOTE — PRE-PROCEDURE
GENERAL PRE-PROCEDURE:   Procedure:  Coronary angiogram  Date/Time:  12/10/2021 2:21 PM    Verbal consent obtained?: Yes    Written consent obtained?: Yes    Risks and benefits: Risks, benefits and alternatives were discussed    Consent given by:  Patient  Patient states understanding of procedure being performed: Yes    Patient's understanding of procedure matches consent: Yes    Procedure consent matches procedure scheduled: Yes    Expected level of sedation:  Moderate  Appropriately NPO:  Yes  ASA Class:  3  Mallampati  :  Grade 3- soft palate visible, posterior pharyngeal wall not visible  Lungs:  Lungs clear with good breath sounds bilaterally  Heart:  Normal heart sounds and rate  History & Physical reviewed:  History and physical reviewed and no updates needed  Statement of review:  I have reviewed the lab findings, diagnostic data, medications, and the plan for sedation

## 2021-12-10 NOTE — PROGRESS NOTES
[facility]    Medicine Progress Note - Hospitalist Service       Date of Admission:  12/9/2021    Assessment & Plan         Neptali Ospina is a 51 year old male with past medical history significant for anxiety, dyslipidemia, DANIELLE, DARLING, and obesity admitted on 12/9/2021 with ventricular tachycardia.       Sustained Monomorphic Ventricular Tachycardia, Suspect RV outflow tract VT  HTN - Noted by patient in clinic and occasionally at home. Notes definite component of white coat hypertension.   Seen in clinic on 11/16 with palpitations. He was placed on a Zio patch at that time. The zio patch data was interpreted today and he was found to have 494 runs of VT with duration from 10 beats to over three minutes, with an average rate of ~250bmp. Dr. Hutton with EP saw the patient urgently in clinic today and recommended admission to the hospital for evaluation with ECHO, coronary angiogram and EP study. EKG here shows sinus rhythm with occasional PVCs.  * Echo unremarkable. Nl RV/LV. No significant valve abnormalities. Mild aortic root dilatation.   - Appreciate cardiology consultation: Recommend angiogram to evaluate for CAD and then EP study. Seen by Dr. Saavedra as well.   - Dr. Saavedra recommends avoiding beta-blocker for now. Will plan to start this AFTER EP study for rate and pressure control.   - Continue ASA, simvastatin  - NPO for angiogram.     Addendum: called regarding significant HTN. Start lisinopril. As above, Dr. Saavedra would like to avoid beta-blockers prior to EP study.      Anxiety   Pt reports a hx of severe anxiety, that is currently exacerbated by his current situation and hospitalization.   - Continue PTA escitalopram   - Lorazepam 0.5mg po q4h PRN for anxiety while in the hospital   - Psych consulted      Dyslipidemia  Low HDL and elevated triglycerides on recent testing   - Continue PTA ASA and simvastatin when verified      Obesity   DANIELLE  Body mass index is 36.49 kg/m .   - CPAP at  night        Diet: NPO per Anesthesia Guidelines for Procedure/Surgery Except for: Meds    DVT Prophylaxis: ambulate, on ASA.   Thomas Catheter: Not present  Central Lines: None  Code Status: Full Code      Disposition Plan   Expected Discharge:  within 2 days after cardiac evaluation.    Anticipated discharge location:  Awaiting care coordination huddle  Delays:           The patient's care was discussed with the cardiologits, RN, patient and wife, Florina. .    Josefa Oakes MD  Hospitalist Service  [facility]  Securely message with the Vocera Web Console (learn more here)  Text page via [facility] Paging/Directory        Clinically Significant Risk Factors Present on Admission              # Platelet Defect: home medication list includes an antiplatelet medication      ______________________________________________________________________    Interval History   Patient feeling well, no palpitations since admission. Agreeable to plan  Denies any CP, SOB, N/V/d.     Data reviewed today: I reviewed all medications, new labs and imaging results over the last 24 hours. I personally reviewed telemetry, currently NSR.   Physical Exam   Vital Signs: Temp: 98.1  F (36.7  C) Temp src: Temporal BP: (!) 148/110 Pulse: 98   Resp: 17 SpO2: 96 % O2 Device: None (Room air)    Weight: 240 lbs 0 oz  Constitutional:  NAD,   Neuropsyche:  alert and oriented, answers questions appropriately. Speech normal, face symmetric and moving all 4 extremities without gross focal neurological deficit.   Respiratory:  Breathing comfortably, good air exchange, no wheezes, no crackles.   Cardiovascular:  Regular rate and rhythm, no edema.  GI:  soft, NT/ND, BS normal  Skin/Integumen:  No acute rash or sign of bleeding.         Data   Recent Labs   Lab 12/09/21  1321   WBC 10.3   HGB 15.1   MCV 88         POTASSIUM 3.8   CHLORIDE 107   CO2 26   BUN 18   CR 0.86   ANIONGAP 8   DT 9.4   *   ALBUMIN 4.0    PROTTOTAL 7.8   BILITOTAL 0.4   ALKPHOS 63   ALT 67   AST 32     Recent Results (from the past 24 hour(s))   Echocardiogram Complete   Result Value    LVEF  55-60%    Narrative    [id number]  [id number]  2010^JEFERSON^DEVONTE^A     [facility]  Echocardiography Laboratory  [address] [place] [address]     Name: JOSE ANDERSON  MRN: [id number]  : 1970  Study Date: 12/10/2021 10:51 AM  Age: 51 yrs  Gender: Male  Patient Location: [facility]  Reason For Study: Syncope  Ordering Physician: DEVONTE GOVEA  Referring Physician: [facility]  Performed By: Manolo Anne RDCS     BSA: 2.2 m2  Height: 68 in  Weight: 240 lb  HR: 85  BP: 153/99 mmHg  ______________________________________________________________________________  Procedure  Complete Portable Echo Adult. Optison (NDC #[id number]) given intravenously.  ______________________________________________________________________________  Interpretation Summary     1. Left ventricular systolic function is normal. The visual ejection fraction  is 55-60%.  2. No regional wall motion abnormalities noted.  3. The right ventricle is normal in structure, function and size.  4. No evidence for significant valvular pathology.  5. Mildly dilated aortic root (sinus of valsalva) 4.1 cm and mildly dilated  ascending aorta, 3.8 cm.  ______________________________________________________________________________  Left Ventricle  The left ventricle is normal in size. Left ventricular systolic function is  normal. The visual ejection fraction is 55-60%. Grade I or early diastolic  dysfunction. No regional wall motion abnormalities noted.     Right Ventricle  The right ventricle is normal in structure, function and size.     Atria  Normal left atrial size. Right atrial size is normal. There is no color  Doppler evidence of an atrial shunt.     Mitral Valve  There is mild mitral annular calcification.     Tricuspid Valve  The  tricuspid valve is normal in structure and function. There is trace  tricuspid regurgitation.     Aortic Valve  The aortic valve is normal in structure and function.     Pulmonic Valve  The pulmonic valve is normal in structure and function.     Vessels  Mild aortic root dilatation. The ascending aorta is Mildly dilated.     Pericardium  There is no pericardial effusion.     Rhythm  Sinus rhythm was noted.  ______________________________________________________________________________  MMode/2D Measurements & Calculations  IVSd: 1.2 cm     LVIDd: 4.8 cm  LVIDs: 3.2 cm  LVPWd: 0.83 cm  FS: 34.5 %  LV mass(C)d: 169.4 grams  LV mass(C)dI: 76.7 grams/m2  Ao root diam: 4.1 cm  LA dimension: 4.7 cm  asc Aorta Diam: 3.8 cm  LA/Ao: 1.1  LA Volume (BP): 69.0 ml  LA Volume Index (BP): 31.2 ml/m2  RWT: 0.34     Doppler Measurements & Calculations  MV E max meredith: 86.6 cm/sec  MV A max meredith: 115.0 cm/sec  MV E/A: 0.75  MV max P.7 mmHg  MV mean P.0 mmHg  MV V2 VTI: 33.0 cm  MV dec time: 0.25 sec  PA acc time: 0.10 sec  E/E' av.1  Lateral E/e': 7.7  Medial E/e': 14.6     ______________________________________________________________________________  Report approved by: Denita Eng 12/10/2021 11:54 AM           Medications     - MEDICATION INSTRUCTIONS -       sodium chloride 100 mL/hr at 12/10/21 1127     sodium chloride         aspirin  243 mg Oral Once     aspirin  81 mg Oral Daily     escitalopram  10 mg Oral QPM     simvastatin  20 mg Oral At Bedtime     sodium chloride (PF)  10 mL Intravenous Once     sodium chloride (PF)  3 mL Intracatheter Q8H

## 2021-12-10 NOTE — CONSULTS
[facility]  Initial Psychiatric Consult   Consult date: December 10, 2021         Reason for Consult, requesting source:    Severe anxiety  Requesting source: Jaja Sanchez        HPI:   Neptali Ospina is a 51 year old male who was admitted to [facility] on 12/9/21 at the recommendation of his outpatient physician for evaluation of ventricular tachycardia. PMH significant for anxiety, dyslipidemia, DANIELLE, DARLING, and obesity. Psychiatry has been consulted for evaluation of his anxiety.     Per chart review, patient has been managed on escitalopram 10 mg daily for several years. Tends to experience more anxiety in healthcare settings. Has history of panic attacks.     On my interview today, patient is seen in his hospital room with his wife present. Patient acknowledges heightened anxiety while in the hospital. His wife has noticed that when various providers enter the room, she has noticed on the monitor that he becomes quite tachycardic. When he hears the rhythm strip print out, he becomes increasingly tachycardic. This anxiety has been ongoing for a number of years. Patient apparently stays quite busy during the day, doesn't slow down much after work. No history of belkis. No history of sleep-deprived energy enhancement. He generally sleeps well at night, 8-9 hours. Reports that since starting the escitalopram several years ago, he has noticed improvements in his mood. He is less edgy, less irritable. The physical sensation of anxiety continues to be an issue. Depression is not a concern at this time. Denies any thoughts of wanting to harm himself or others.    Discussed the option of slowly increasing escitalopram and he prefers this since it is a drug he is familiar with. Would also quite a bit longer to experience relief if he were to switch to another antidepressant.         Past Psychiatric History:   No history of psychiatric hospitalizations or suicide  attempts. No current psychiatrist. Has seen a psychologist in the past, last in 2018 per chart review. Has been prescribed escitalopram 10 mg for the last several years. No other known medication trials. No hx of ECT or MH commitment.         Substance Use and History:   Non-smoker. No illicit substance use. Occasional alcohol use. No hx of CD treatment.         Past Medical History:   PAST MEDICAL HISTORY:   Past Medical History:   Diagnosis Date     High triglycerides      DARLING (nonalcoholic steatohepatitis)     12/10      DANIELLE on CPAP      Paroxysmal ventricular tachycardia (H)      Strabismus      Thyroid nodule 2012    biopsy thyroglossal ductal cyst.  see Endo     Vitamin D deficiency        PAST SURGICAL HISTORY:   Past Surgical History:   Procedure Laterality Date     PE TUBES       TONSILLECTOMY & ADENOIDECTOMY       VASECTOMY               Family History:   FAMILY HISTORY:   Family History   Problem Relation Age of Onset     C.A.D. Mother      Cancer Mother         brain tumor     Circulatory Father         sleep apnea,   in sleep 58     Gynecology Sister         x2           Social History:   Lives with wife in [place]. 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 They have 4 children.          Physical ROS:   The patient endorsed anxiety, palpitations. The remainder of 10-point review of systems was negative except as noted in HPI.         PTA Medications:     Medications Prior to Admission   Medication Sig Dispense Refill Last Dose     aspirin 81 MG tablet Take 1 tablet by mouth daily. 90 tablet 3 2021 at am     escitalopram (LEXAPRO) 10 MG tablet Take 1 tablet (10 mg) by mouth daily 90 tablet 3 2021 at pm     fish oil-omega-3 fatty acids (OMEGA 3) 1000 MG capsule Take 1 capsule by mouth 2 times daily. Lavazo fish oil 180 capsule 1 2021 at am     fluticasone (FLONASE) 50 MCG/ACT nasal spray Spray 1-2 sprays into both nostrils daily (Patient taking differently: Spray 1-2 sprays into both  nostrils daily as needed ) 1 Package 1 prn at prn     magnesium oxide 200 MG TABS Take 200 mg by mouth daily   12/9/2021 at am     Multiple Vitamin (MULTI-VITAMIN) per tablet Take 1 tablet by mouth daily. 90 tablet 3 12/9/2021 at am     simvastatin (ZOCOR) 20 MG tablet TAKE ONE TABLET BY MOUTH EVERY NIGHT AT BEDTIME 90 tablet 3 12/8/2021 at pm          Allergies:     Allergies   Allergen Reactions     Nkda [No Known Drug Allergies]           Labs:     Recent Results (from the past 48 hour(s))   EKG 12-lead, tracing only    Collection Time: 12/09/21 12:57 PM   Result Value Ref Range    Systolic Blood Pressure  mmHg    Diastolic Blood Pressure  mmHg    Ventricular Rate 106 BPM    Atrial Rate 106 BPM    OR Interval 184 ms    QRS Duration 68 ms     ms    QTc 446 ms    P Axis 41 degrees    R AXIS 25 degrees    T Axis 54 degrees    Interpretation ECG       Sinus tachycardia with occasional Premature ventricular complexes  Possible Left atrial enlargement  Borderline ECG  No previous ECGs available  Confirmed by GENERATED REPORT, COMPUTER ([id number]),  DEIRDRE ADVISON ([id number]) on 12/9/2021 1:50:48 PM     Comprehensive metabolic panel    Collection Time: 12/09/21  1:21 PM   Result Value Ref Range    Sodium 141 133 - 144 mmol/L    Potassium 3.8 3.4 - 5.3 mmol/L    Chloride 107 94 - 109 mmol/L    Carbon Dioxide (CO2) 26 20 - 32 mmol/L    Anion Gap 8 3 - 14 mmol/L    Urea Nitrogen 18 7 - 30 mg/dL    Creatinine 0.86 0.66 - 1.25 mg/dL    Calcium 9.4 8.5 - 10.1 mg/dL    Glucose 124 (H) 70 - 99 mg/dL    Alkaline Phosphatase 63 40 - 150 U/L    AST 32 0 - 45 U/L    ALT 67 0 - 70 U/L    Protein Total 7.8 6.8 - 8.8 g/dL    Albumin 4.0 3.4 - 5.0 g/dL    Bilirubin Total 0.4 0.2 - 1.3 mg/dL    GFR Estimate >90 >60 mL/min/1.73m2   Troponin I    Collection Time: 12/09/21  1:21 PM   Result Value Ref Range    Troponin I High Sensitivity 6 <79 ng/L   Magnesium    Collection Time: 12/09/21  1:21 PM   Result Value Ref Range    Magnesium 2.0  "1.6 - 2.3 mg/dL   TSH with free T4 reflex    Collection Time: 12/09/21  1:21 PM   Result Value Ref Range    TSH 2.38 0.40 - 4.00 mU/L   CBC with platelets and differential    Collection Time: 12/09/21  1:21 PM   Result Value Ref Range    WBC Count 10.3 4.0 - 11.0 10e3/uL    RBC Count 5.08 4.40 - 5.90 10e6/uL    Hemoglobin 15.1 13.3 - 17.7 g/dL    Hematocrit 44.9 40.0 - 53.0 %    MCV 88 78 - 100 fL    MCH 29.7 26.5 - 33.0 pg    MCHC 33.6 31.5 - 36.5 g/dL    RDW 12.2 10.0 - 15.0 %    Platelet Count 178 150 - 450 10e3/uL    % Neutrophils 83 %    % Lymphocytes 10 %    % Monocytes 6 %    % Eosinophils 0 %    % Basophils 0 %    % Immature Granulocytes 1 %    NRBCs per 100 WBC 0 <1 /100    Absolute Neutrophils 8.6 (H) 1.6 - 8.3 10e3/uL    Absolute Lymphocytes 1.1 0.8 - 5.3 10e3/uL    Absolute Monocytes 0.6 0.0 - 1.3 10e3/uL    Absolute Eosinophils 0.0 0.0 - 0.7 10e3/uL    Absolute Basophils 0.0 0.0 - 0.2 10e3/uL    Absolute Immature Granulocytes 0.1 <=0.4 10e3/uL    Absolute NRBCs 0.0 10e3/uL   Asymptomatic COVID-19 Virus (Coronavirus) by PCR Nasopharyngeal    Collection Time: 12/09/21  1:28 PM    Specimen: Nasopharyngeal; Swab   Result Value Ref Range    SARS CoV2 PCR Negative Negative   Echocardiogram Complete    Collection Time: 12/10/21 11:28 AM   Result Value Ref Range    LVEF  55-60%           Physical and Psychiatric Examination:     BP (!) 153/99   Pulse 84   Temp 98.1  F (36.7  C) (Oral)   Resp 8   Ht 1.727 m (5' 8\")   Wt 108.9 kg (240 lb)   SpO2 96%   BMI 36.49 kg/m    Weight is 240 lbs 0 oz  Body mass index is 36.49 kg/m .    Physical Exam:  I have reviewed the physical exam as documented by by the medical team and agree with findings and assessment and have no additional findings to add at this time.    Mental Status Exam:  Appearance: age-appearing, dressed in hospital gown, appropriate hygiene and grooming, in no acute distress  Behavior: cooperative, pleasant and calm  Eye contact: good  Orientation: " "alert and oriented to time, place and person  Movements: no tics, tremors, or dystonia observed  Mood: \"good\"  Affect: mildly anxious, mood-congruent, stable  Speech: Normal rate, rhythm, tone  Language: fluent, with appropriately placed inflections, good articulation  Memory: no impairment in immediate, recent, or remote memory  Intellectual capacity: average for development based on word choice   Concentration: attentive  Thought process and content: linear, coherent, organized; no delusions or paranoia endorsed or elicited; no evidence of anomalous perceptions.   Associations: tight  Insight: intact  Judgement: intact  Safety: denies suicidal or homicidal ideation           DSM-5 Diagnosis:   #1 Generalized Anxiety Disorder          Assessment:   Neptali Ospina is a 51 year old male who was admitted to [facility] on 12/9/21 at the recommendation of his outpatient physician for evaluation of ventricular tachycardia. PMH significant for anxiety, dyslipidemia, DANIELLE, DARLING, and obesity. Psychiatry has been consulted for evaluation of his anxiety.     Met with Mr. Ospina and his wife in patient's hospital room today. Describes a long history of anxiety. History of panic symptoms. Has been on escitalopram for the last several years at a dose of 10 mg. He has noticed some improvements since starting escitalopram, including feeling less edgy, less irritable. It appears it has provided some benefit but he may need a higher dose. Typically, anxiety tends to respond better to antidepressant doses on the upper end of the range in order to adequately treat. Appears his QTc was 446 on 12/9. Celexa tends to carry more risk for cardiac arrhythmias and prolonged QT interval than Lexapro. Patient is agreeable to try an increased dose. Discussed he may require a dose of 20 mg, but we can start by increasing to 15 mg. Encouraged to follow-up with his outpatient provider in the next few weeks to determine whether " another increase may be warranted. I also spoke to the patient about considering outpatient psychotherapy as the best strategy for anxiety tends to be a combination of medication and therapy. Reviewed with him that lorazepam is available PRN if he feels like his anxiety is becoming unmanageable.           Summary of Recommendations:   1) Recommend to increase escitalopram to 15 mg at bedtime. Can be re-evaluated in a couple of weeks by his outpatient PCP. If tolerating 15 mg, he could go to 20 mg after a week or two.     2) Encouraged patient to consider outpatient psychotherapy as the best treatment for anxiety tends to be a combination of SSRI plus cognitive-behavioral therapy.      3) Agree with short-term use of lorazepam 0.5 mg q4h prn for acute anxiety while in the hospital. Would avoid QT prolonging agents.     4) Re-consult psychiatry as needed, thank you.    Aristides Fairchild, GILMAP-BC, APRN, CNP  Consult/Liaison Psychiatry  [facility]  Provider can be paged via [facility] Paging/Directory  If I am unavailable, please contact [facility] at [phone] to reach the on-call provider.

## 2021-12-11 PROCEDURE — 250N000013 HC RX MED GY IP 250 OP 250 PS 637: Performed by: NURSE PRACTITIONER

## 2021-12-11 PROCEDURE — 99231 SBSQ HOSP IP/OBS SF/LOW 25: CPT | Performed by: INTERNAL MEDICINE

## 2021-12-11 PROCEDURE — 250N000013 HC RX MED GY IP 250 OP 250 PS 637: Performed by: INTERNAL MEDICINE

## 2021-12-11 PROCEDURE — 99232 SBSQ HOSP IP/OBS MODERATE 35: CPT | Performed by: INTERNAL MEDICINE

## 2021-12-11 PROCEDURE — 210N000002 HC R&B HEART CARE

## 2021-12-11 PROCEDURE — 250N000013 HC RX MED GY IP 250 OP 250 PS 637: Performed by: STUDENT IN AN ORGANIZED HEALTH CARE EDUCATION/TRAINING PROGRAM

## 2021-12-11 RX ADMIN — SIMVASTATIN 20 MG: 20 TABLET, FILM COATED ORAL at 21:12

## 2021-12-11 RX ADMIN — ASPIRIN 81 MG: 81 TABLET, COATED ORAL at 09:05

## 2021-12-11 RX ADMIN — LISINOPRIL 5 MG: 5 TABLET ORAL at 13:05

## 2021-12-11 RX ADMIN — ACETAMINOPHEN 650 MG: 325 TABLET, FILM COATED ORAL at 17:47

## 2021-12-11 RX ADMIN — ESCITALOPRAM 15 MG: 5 TABLET, FILM COATED ORAL at 20:53

## 2021-12-11 ASSESSMENT — ACTIVITIES OF DAILY LIVING (ADL)
ADLS_ACUITY_SCORE: 4

## 2021-12-11 ASSESSMENT — MIFFLIN-ST. JEOR: SCORE: 1895.92

## 2021-12-11 NOTE — UTILIZATION REVIEW
[facility] Utilization Review  Admission Status; Secondary Review Determination     Admission Date: 12/9/2021 12:57 PM      Under the authority of the Utilization Management Committee, the utilization review process indicated a secondary review on the above patient.  The review outcome is based on review of the medical records, discussions with staff, and applying clinical experience noted on the date of the review.        (X)      Inpatient Status Appropriate - This patient's medical care is consistent with medical management for inpatient care and reasonable inpatient medical practice.          RATIONALE FOR DETERMINATION   51-year-old male with history of frequent PVCs, nonsustained V. tach, admitted with ventricular tachycardia with chest fluttering intermittently.  Recent Zio patch detected frequent PVCs and many episodes of fast VT with spontaneous termination.  Patient continues to have short runs of VT and frequent PVCs in the hospital, seen by cardiology team and recommend EP study with possible VT ablation on Monday, if arrhythmia is not applicable then plan for antiarrhythmic drug instead of ICD.  Plan for TTE, coronary angiogram.  Patient also evaluated by psychiatry due to anxiety disorder with increasing escitalopram dose and short-term use of lorazepam.  Complex patient with PVCs, recurrent episodes of V. tach, needs close monitoring in the hospital, on telemetry, is at risk of acute decompensation, plan for further work-up including coronary angiogram, VT ablation, recommend continue inpatient status      The severity of illness, intensity of service provided, expected LOS and risk for adverse outcome make the care complex, high risk and appropriate for hospital admission.The patient requires hospital based medical care which is anticipated to require a stay of 2 or more midnights.        The information on this document is developed by the utilization review team in order for the  business office to ensure compliance.  This only denotes the appropriateness of proper admission status and does not reflect the quality of care rendered.              Sincerely,       Day Cohen MD  Physician Advisor  Utilization Review-[place]    Phone: [phone]

## 2021-12-11 NOTE — PLAN OF CARE
A/Ox4. VSS. Right groin site soft, CDI with CMS intact. Lung sounds are clear. Up independently. Plan for EP study and possible ablation Monday. Tele SR.

## 2021-12-11 NOTE — DISCHARGE INSTRUCTIONS
EP Study or Ablation Discharge Instructions - Femoral    After you go home:      Have an adult stay with you until tomorrow.    You may resume your normal diet.       For 24 hours - due to the sedation you received:    Relax and take it easy.    Do NOT make any important or legal decisions.    Do NOT drive or operate machines at home or at work.    Do NOT drink alcohol.    Care of Groin Puncture Site:      For the first 24 hrs - check the puncture site every 1-2 hours while awake.    For 2 days, when you cough, sneeze, laugh or move your bowels, hold your hand over the puncture site and press firmly.    Remove the bandaid after 24 hours. If there is minor oozing, apply another bandaid and remove it after 12 hours.    It is normal to have a small bruise or pea size lump at the site.    You may shower tomorrow. Do NOT take a bath, or use a hot tub or pool for at least 3 days. Do NOT scrub the site. Do not use lotion or powder near the puncture site.    Activity:            For 2 days:    No stooping or squatting    Do NOT do any heavy activity such as exercise, lifting, or straining.     No housework, yard work or any activity that make you sweat    Do NOT lift more than 10 pounds    Bleeding:      If you start bleeding from the site in your groin, lie down flat and press firmly on the site for 10 minutes.     Once bleeding stops, lay flat for 2 hours.    Call [facility] as soon as you can.       Call 911 right away if you have heavy bleeding or bleeding that does not stop.      Medicines:      Take your medications, including blood thinners, unless your provider tells you not to.    If you have stopped any other medicines, check with your provider about when to restart them.    If you have pain or shortness of breath, you may take Advil (ibuprofen) or Tylenol (acetaminophen).    Follow Up Appointments:      Follow up with [facility] Nurse Practitioner at [facility] of patient preference in 7-10  days.    Call the clinic if:      You have increased pain or a large or growing hard lump around the site.    The site is red, swollen, hot or tender.    Blood or fluid is draining from the site.    You have chills or a fever greater than 101 F (38 C).    Your leg turns feels numb, cool or changes color.    Increased pain in the chest and/or groin.    New pain in the back or belly that you cannot control with Tylenol.    Shortness of breath or chest pain that is not relieved by Advil or Tylenol.    Recurrent irregular or fast heart rate lasting over 2 hours.    Any questions or concerns.    Heart rhythms:    You may have some premature heartbeats. These feel very strong. They may make you feel that the fast heart rhythm (tachycardia) is going to start again.  Give it time. The premature beats should occur less often.       [facility] at [place]:    [phone] [facility] (7 days a week)            Cardiac Angiogram Discharge Instructions - Femoral    After you go home:      Have an adult stay with you until tomorrow.    Drink extra fluids for 2 days.    You may resume your normal diet.    No smoking       For 24 hours - due to the sedation you received:    Relax and take it easy.    Do NOT make any important or legal decisions.    Do NOT drive or operate machines at home or at work.    Do NOT drink alcohol.    Care of Groin Puncture Site:      For the first 24 hrs - check the puncture site every 1-2 hours while awake.    For 2 days, when you cough, sneeze, laugh or move your bowels, hold your hand over the puncture site and press firmly.    Remove the bandaid after 24 hours. If there is minor oozing, apply another bandaid and remove it after 12 hours.    It is normal to have a small bruise or pea size lump at the site.    You may shower tomorrow. Do NOT take a bath, or use a hot tub or pool for at least 3 days. Do NOT scrub the site. Do not use lotion or powder near the puncture  site.    Activity:            For 2 days:    No stooping or squatting    Do NOT do any heavy activity such as exercise, lifting, or straining.     No housework, yard work or any activity that make you sweat    Do NOT lift more than 10 pounds    Bleeding:      If you start bleeding from the site in your groin, lie down flat and press firmly on/above the site for 10 minutes.     Once bleeding stops, lay flat for 2 hours.     Call [facility] as soon as you can.       Call 911 right away if you have heavy bleeding or bleeding that does not stop.      Medicines:      If you are taking an antiplatelet medication such as Plavix, Brilinta or Effient, do not stop taking it until you talk to your cardiologist.      If you are on Metformin (Glucophage), do not restart it until you have blood tests (within 2 to 3 days after discharge).  After you have your blood drawn, you may restart the Metformin.     Take your medications, including blood thinners, unless your provider tells you not to.      If you take Coumadin (Warfarin), have your INR checked by your provider in  3-5 days. Call your clinic to schedule this.    If you have stopped any medicines, check with your provider about when to restart them.    Follow Up Appointments:      Follow up with [facility] Nurse Practitioner at [facility] of patient preference in 7-10 days.    Call the clinic if:      You have increased pain or a large or growing hard lump around the site.    The site is red, swollen, hot or tender.    Blood or fluid is draining from the site.    You have chills or a fever greater than 101 F (38 C).    Your leg feels numb, cool or changes color.    You have hives, a rash or unusual itching.    New pain in the back or belly that you cannot control with Tylenol.    Any questions or concerns.          [facility] at [place]:    [phone] [facility] (7 days a week)

## 2021-12-11 NOTE — PROGRESS NOTES
[facility]    Medicine Progress Note - Hospitalist Service       Date of Admission:  12/9/2021    Assessment & Plan         Neptali Ospina is a 51 year old male with past medical history significant for anxiety, dyslipidemia, DANIELLE, DARLING, and obesity admitted on 12/9/2021 with ventricular tachycardia.      Seen in clinic on 11/16 with palpitations. He was placed on a Zio patch at that time. The zio patch data was interpreted today and he was found to have 494 runs of VT with duration from 10 beats to over three minutes, with an average rate of ~250bmp. Dr. Hutton with EP saw the patient urgently in clinic today and recommended admission to the hospital for evaluation with ECHO, coronary angiogram and EP study. EKG here shows sinus rhythm with occasional PVCs.     Sustained Monomorphic Ventricular Tachycardia, Suspect RV outflow tract VT  HTN - Noted by patient in clinic and occasionally at home. Notes definite component of white coat hypertension.     * Echo unremarkable. Nl RV/LV. No significant valve abnormalities. Mild aortic root dilatation.   * Angiogram on 12/10/21 revealed normal coronary arteries.     - Appreciate cardiology consultation:  - Dr. Saavedra recommends avoiding beta-blocker for now. Will plan to start this AFTER EP study for rate and pressure control.   - Started lisinopril 5 mg on 12/11/21 for HTN with good control. Patient is very thankful for this, BMP on Monday.     Anxiety   Pt reports a hx of severe anxiety, that is currently exacerbated by his current situation and hospitalization.   - Psychiatry increased PTA escitalopram to 15 mg, may increase further to 20 mg after 1-2 weeks.   - Recommend cognitive-behavioral therapy post discharge.   - Lorazepam 0.5mg po q4h PRN for anxiety while in the hospital. Has used twice since admission.     Dyslipidemia  Low HDL and elevated triglycerides on recent testing   - Continue PTA ASA and simvastatin      Obesity   DANIELLE  Body mass  index is 36.49 kg/m .   - CPAP at night      Diet: Regular Diet Adult    DVT Prophylaxis: ambulate, on ASA.   Thomas Catheter: Not present  Central Lines: None  Code Status: Full Code      Disposition Plan   Expected Discharge: 12/14/2021 within 2 days after cardiac evaluation.    Anticipated discharge location:  Awaiting care coordination huddle  Delays:         The patient's care was discussed with the cardiologits, RN, patient and wife, Florina. .    Josefa Oakes MD  Hospitalist Service  [facility]  Securely message with the Vocera Web Console (learn more here)  Text page via [facility] Paging/Directory        Clinically Significant Risk Factors Present on Admission                ______________________________________________________________________    Interval History   Patient is very thankful angiogram was clean. He has not noted any palpitations since coming to the hospital.   Denies any CP, SOB, N/V/D.     Data reviewed today: I reviewed all medications, new labs and imaging results over the last 24 hours. I personally reviewed telemetry, currently NSR.   Physical Exam   Vital Signs: Temp: 98.2  F (36.8  C) Temp src: Oral BP: 127/81 Pulse: 102   Resp: 18 SpO2: 96 % O2 Device: None (Room air)    Weight: 234 lbs 0 oz  Constitutional:  NAD,   Neuropsyche:  alert and oriented, answers questions appropriately. Speech normal, face symmetric and moving all 4 extremities without gross focal neurological deficit.   Respiratory:  Breathing comfortably, good air exchange, no wheezes, no crackles.   Cardiovascular:  Regular rate and rhythm, no edema.  GI:  soft, NT/ND, BS normal  Skin/Integumen:  No acute rash or sign of bleeding.     Data   Recent Labs   Lab 12/10/21  1205 12/09/21  1321   WBC  --  10.3   HGB  --  15.1   MCV  --  88   PLT  --  178   NA  --  141   POTASSIUM  --  3.8   CHLORIDE  --  107   CO2  --  26   BUN  --  18   CR 0.93 0.86   ANIONGAP  --  8   TD  --  9.4   GLC  --  124*    ALBUMIN  --  4.0   PROTTOTAL  --  7.8   BILITOTAL  --  0.4   ALKPHOS  --  63   ALT  --  67   AST  --  32     Recent Results (from the past 24 hour(s))   Cardiac Catheterization    Narrative    Normal coronary angiography     Medications     sodium chloride 100 mL/hr at 12/10/21 1127       escitalopram  15 mg Oral QPM     lisinopril  5 mg Oral Daily     simvastatin  20 mg Oral At Bedtime     sodium chloride (PF)  3 mL Intracatheter Q8H

## 2021-12-11 NOTE — PROGRESS NOTES
"[facility] Cardiology Progress Note       Assessment and Plan:   Probable RVOT VT EP study on Monday.  Mild aortic root dilatation.  Dyslipidemia, on 20 mg simvastatin.  Obesity         Interval History:   Doing, no prolonged arrythmia, no cardiac symptoms.  R groin access site fine:  No hematoma.                  Medications:     Current Facility-Administered Medications   Medication     acetaminophen (TYLENOL) tablet 650 mg     aspirin EC tablet 81 mg     escitalopram (LEXAPRO) tablet 15 mg     fentaNYL (PF) (SUBLIMAZE) injection 25 mcg     HOLD:  Metformin and metformin containing medications if patient received IV contrast with acute kidney injury or severe chronic kidney disease (stage IV or stage V; i.e., eGFR less than 30)     lidocaine (LMX4) cream     lidocaine 1 % 0.1-1 mL     [Held by provider] lisinopril (ZESTRIL) tablet 5 mg     LORazepam (ATIVAN) tablet 0.5 mg     melatonin tablet 1 mg     midazolam (VERSED) injection 0.5 mg     naloxone (NARCAN) injection 0.2 mg    Or     naloxone (NARCAN) injection 0.4 mg    Or     naloxone (NARCAN) injection 0.2 mg    Or     naloxone (NARCAN) injection 0.4 mg     ondansetron (ZOFRAN-ODT) ODT tab 4 mg    Or     ondansetron (ZOFRAN) injection 4 mg     oxyCODONE (ROXICODONE) tablet 5 mg    Or     oxyCODONE (ROXICODONE) tablet 10 mg     simvastatin (ZOCOR) tablet 20 mg     sodium chloride (PF) 0.9% PF flush 3 mL     sodium chloride 0.9% infusion             Physical Exam:   Blood pressure 99/62, pulse 63, temperature 97.8  F (36.6  C), temperature source Oral, resp. rate 16, height 1.727 m (5' 8\"), weight 106.1 kg (234 lb), SpO2 94 %.  Wt Readings from Last 4 Encounters:   21 106.1 kg (234 lb)   21 108.4 kg (239 lb)   21 110.7 kg (244 lb)   21 108.9 kg (240 lb)         Vital Sign Ranges  Temperature Temp  Av  F (36.7  C)  Min: 97.8  F (36.6  C)  Max: 98.1  F (36.7  C)   Blood pressure Systolic (24hrs), Av , Min:99 , Max:171     "    Diastolic (24hrs), Av, Min:56, Max:105      Pulse Pulse  Av.9  Min: 59  Max: 105   Respirations Resp  Av  Min: 8  Max: 16   Pulse oximetry SpO2  Av.6 %  Min: 90 %  Max: 96 %         Intake/Output Summary (Last 24 hours) at 2021 0842  Last data filed at 12/10/2021 1730  Gross per 24 hour   Intake 240 ml   Output --   Net 240 ml          Cardiovascular:   Normal apical impulse, regular rate and rhythm, normal S1 and S2, no S3 or S4, and no murmur noted   Chest clear.  No peripheral oedema         Data:     Labs:  Lab Results   Component Value Date     2021     2020    Lab Results   Component Value Date    CHLORIDE 107 2021    CHLORIDE 103 2020    Lab Results   Component Value Date    BUN 18 2021    BUN 13 2020      Lab Results   Component Value Date    POTASSIUM 3.8 2021    POTASSIUM 4.1 2020    Lab Results   Component Value Date    CO2 26 2021    CO2 31 2020    Lab Results   Component Value Date    CR 0.93 12/10/2021    CR 0.87 2020        Lab Results   Component Value Date    WBC 10.3 2021    HGB 15.1 2021    HCT 44.9 2021    MCV 88 2021     2021     No results found for: TROPONIN, TROPI, TROPR        Attestation:  I have reviewed today's vital signs, notes, medications, labs and imaging.         DR RAMSEY DE LA ROSA MD 2021  8:42 AM

## 2021-12-11 NOTE — PLAN OF CARE
Neuro- A&OX4   Most Recent Vitals- Temp: 98.2  F (36.8  C) Temp src: Oral BP: 135/83 Pulse: 86   Resp: 18 SpO2: 96 % O2 Device: None (Room air)   Tele/Cardiac- SR, had 3-4 beats V-run today, asymptomatic.   Resp- RA   Activity- Independent   Pain- Denies   Drips- none   Drains/Tubes- P-IV   Skin- Right groin angio site CDI.   GI/- WDL   Aggression Color- Green  COVID status- Negative  Plan- EP consult and C-MRI on Monday   [facility]     Olimpia Diana RN

## 2021-12-12 LAB
ANION GAP SERPL CALCULATED.3IONS-SCNC: 3 MMOL/L (ref 3–14)
BUN SERPL-MCNC: 18 MG/DL (ref 7–30)
CALCIUM SERPL-MCNC: 8.6 MG/DL (ref 8.5–10.1)
CHLORIDE BLD-SCNC: 110 MMOL/L (ref 94–109)
CO2 SERPL-SCNC: 28 MMOL/L (ref 20–32)
CREAT SERPL-MCNC: 0.9 MG/DL (ref 0.66–1.25)
GFR SERPL CREATININE-BSD FRML MDRD: >90 ML/MIN/1.73M2
GLUCOSE BLD-MCNC: 95 MG/DL (ref 70–99)
HGB BLD-MCNC: 14.6 G/DL (ref 13.3–17.7)
POTASSIUM BLD-SCNC: 4 MMOL/L (ref 3.4–5.3)
SODIUM SERPL-SCNC: 141 MMOL/L (ref 133–144)

## 2021-12-12 PROCEDURE — 99231 SBSQ HOSP IP/OBS SF/LOW 25: CPT | Performed by: INTERNAL MEDICINE

## 2021-12-12 PROCEDURE — 250N000013 HC RX MED GY IP 250 OP 250 PS 637: Performed by: INTERNAL MEDICINE

## 2021-12-12 PROCEDURE — 36415 COLL VENOUS BLD VENIPUNCTURE: CPT | Performed by: INTERNAL MEDICINE

## 2021-12-12 PROCEDURE — 82310 ASSAY OF CALCIUM: CPT | Performed by: INTERNAL MEDICINE

## 2021-12-12 PROCEDURE — 99232 SBSQ HOSP IP/OBS MODERATE 35: CPT | Performed by: INTERNAL MEDICINE

## 2021-12-12 PROCEDURE — 250N000013 HC RX MED GY IP 250 OP 250 PS 637: Performed by: STUDENT IN AN ORGANIZED HEALTH CARE EDUCATION/TRAINING PROGRAM

## 2021-12-12 PROCEDURE — 250N000013 HC RX MED GY IP 250 OP 250 PS 637: Performed by: NURSE PRACTITIONER

## 2021-12-12 PROCEDURE — 85018 HEMOGLOBIN: CPT | Performed by: INTERNAL MEDICINE

## 2021-12-12 PROCEDURE — 210N000002 HC R&B HEART CARE

## 2021-12-12 RX ADMIN — SIMVASTATIN 20 MG: 20 TABLET, FILM COATED ORAL at 21:24

## 2021-12-12 RX ADMIN — ESCITALOPRAM 15 MG: 5 TABLET, FILM COATED ORAL at 21:24

## 2021-12-12 RX ADMIN — LISINOPRIL 5 MG: 5 TABLET ORAL at 08:35

## 2021-12-12 ASSESSMENT — ACTIVITIES OF DAILY LIVING (ADL)
ADLS_ACUITY_SCORE: 4

## 2021-12-12 ASSESSMENT — MIFFLIN-ST. JEOR: SCORE: 1897.73

## 2021-12-12 NOTE — PROGRESS NOTES
[place] to [facility] [place] [facility], belongings transferred with pt, report given to Angie TAYLOR RN, call light within reach.

## 2021-12-12 NOTE — PROGRESS NOTES
[facility]    Medicine Progress Note - Hospitalist Service       Date of Admission:  12/9/2021    Assessment & Plan         Neptali Ospina is a 51 year old male with past medical history significant for anxiety, dyslipidemia, DANIELLE, DARLING, and obesity admitted on 12/9/2021 with ventricular tachycardia.      Seen in clinic on 11/16 with palpitations. He was placed on a Zio patch at that time. The zio patch data was interpreted today and he was found to have 494 runs of VT with duration from 10 beats to over three minutes, with an average rate of ~250bmp. Dr. Hutton with EP saw the patient urgently in clinic today and recommended admission to the hospital for evaluation with ECHO, coronary angiogram and EP study. EKG here shows sinus rhythm with occasional PVCs.     Sustained Monomorphic Ventricular Tachycardia, Suspect RV outflow tract VT  HTN - Noted by patient in clinic and occasionally at home. Notes definite component of white coat hypertension.     * Echo unremarkable. Nl RV/LV. No significant valve abnormalities. Mild aortic root dilatation.   * Angiogram on 12/10/21 revealed normal coronary arteries.     - Appreciate cardiology consultation:  - Dr. Saavedra recommends avoiding beta-blocker for now. Will plan to start this AFTER EP study for rate and pressure control.   - Started lisinopril 5 mg on 12/11/21 for HTN with good control. Patient is very thankful for this. On 12/12/21: K 4, Cr 0.9     Anxiety   Pt reports a hx of severe anxiety, that is currently exacerbated by his current situation and hospitalization.   - Psychiatry increased PTA escitalopram to 15 mg, may increase further to 20 mg after 1-2 weeks.   - Recommend cognitive-behavioral therapy post discharge.   - Lorazepam 0.5mg po q4h PRN for anxiety while in the hospital. Has used twice since admission, last 12/10.      Dyslipidemia  Low HDL and elevated triglycerides on recent testing   - Continue PTA ASA and simvastatin       Obesity   DANIELLE  Body mass index is 36.49 kg/m .   - CPAP at night      Diet: Regular Diet Adult    DVT Prophylaxis: ambulate, on ASA.   Thomas Catheter: Not present  Central Lines: None  Code Status: Full Code      Disposition Plan   Expected Discharge: 12/14/2021 within 2 days after cardiac evaluation.    Anticipated discharge location:  Awaiting care coordination huddle  Delays:         The patient's care was discussed with the cardiologits, RN, patient and wife, Florina. .    Josefa Oakes MD  Hospitalist Service  [facility]  Securely message with the Vocera Web Console (learn more here)  Text page via [facility] Paging/Directory        Clinically Significant Risk Factors Present on Admission                ______________________________________________________________________    Interval History   Doing well, just awaiting EP study tomorrow. Very pleasant.   Denies any CP, SOB, N/V/D.     Data reviewed today: I reviewed all medications, new labs and imaging results over the last 24 hours. I personally reviewed telemetry, currently NSR.   Physical Exam   Vital Signs: Temp: 97.6  F (36.4  C) Temp src: Oral BP: (!) 145/87 Pulse: 84   Resp: 18 SpO2: 97 % O2 Device: None (Room air)    Weight: 234 lbs 6.4 oz  Constitutional:  NAD,   Neuropsyche:  alert and oriented, answers questions appropriately. Speech normal, face symmetric and moving all 4 extremities without gross focal neurological deficit.   Respiratory:  Breathing comfortably, good air exchange, no wheezes, no crackles.   Cardiovascular:  Regular rate and rhythm, no edema.  GI:  soft, NT/ND, BS normal  Skin/Integumen:  No acute rash or sign of bleeding.     Data   Recent Labs   Lab 12/12/21  0725 12/10/21  1205 12/09/21  1321   WBC  --   --  10.3   HGB 14.6  --  15.1   MCV  --   --  88   PLT  --   --  178     --  141   POTASSIUM 4.0  --  3.8   CHLORIDE 110*  --  107   CO2 28  --  26   BUN 18  --  18   CR 0.90 0.93 0.86    ANIONGAP 3  --  8   TD 8.6  --  9.4   GLC 95  --  124*   ALBUMIN  --   --  4.0   PROTTOTAL  --   --  7.8   BILITOTAL  --   --  0.4   ALKPHOS  --   --  63   ALT  --   --  67   AST  --   --  32     No results found for this or any previous visit (from the past 24 hour(s)).  Medications     sodium chloride 100 mL/hr at 12/10/21 1127       escitalopram  15 mg Oral QPM     lisinopril  5 mg Oral Daily     simvastatin  20 mg Oral At Bedtime     sodium chloride (PF)  3 mL Intracatheter Q8H

## 2021-12-12 NOTE — PROGRESS NOTES
"[facility] Cardiology Progress Note       Assessment and Plan:   Probable RVOT tachycardia, structurally normal heart (mild aortic dilatation, probably normal once corrected for BSA).  No BB/antiarrythmics per Dr Saavedra  EPS/ablation tomorrow.            Interval History:   Short runs of NSVT overnight, but asymptomatic.                  Medications:     Current Facility-Administered Medications   Medication     acetaminophen (TYLENOL) tablet 650 mg     escitalopram (LEXAPRO) tablet 15 mg     fentaNYL (PF) (SUBLIMAZE) injection 25 mcg     HOLD:  Metformin and metformin containing medications if patient received IV contrast with acute kidney injury or severe chronic kidney disease (stage IV or stage V; i.e., eGFR less than 30)     lidocaine (LMX4) cream     lidocaine 1 % 0.1-1 mL     lisinopril (ZESTRIL) tablet 5 mg     LORazepam (ATIVAN) tablet 0.5 mg     melatonin tablet 1 mg     midazolam (VERSED) injection 0.5 mg     naloxone (NARCAN) injection 0.2 mg    Or     naloxone (NARCAN) injection 0.4 mg    Or     naloxone (NARCAN) injection 0.2 mg    Or     naloxone (NARCAN) injection 0.4 mg     ondansetron (ZOFRAN-ODT) ODT tab 4 mg    Or     ondansetron (ZOFRAN) injection 4 mg     oxyCODONE (ROXICODONE) tablet 5 mg    Or     oxyCODONE (ROXICODONE) tablet 10 mg     simvastatin (ZOCOR) tablet 20 mg     sodium chloride (PF) 0.9% PF flush 3 mL     sodium chloride 0.9% infusion             Physical Exam:   Blood pressure (!) 145/87, pulse 84, temperature 97.6  F (36.4  C), temperature source Oral, resp. rate 18, height 1.727 m (5' 8\"), weight 106.3 kg (234 lb 6.4 oz), SpO2 97 %.  Wt Readings from Last 4 Encounters:   21 106.3 kg (234 lb 6.4 oz)   21 108.4 kg (239 lb)   21 110.7 kg (244 lb)   21 108.9 kg (240 lb)         Vital Sign Ranges  Temperature Temp  Av.9  F (36.6  C)  Min: 97.4  F (36.3  C)  Max: 98.2  F (36.8  C)   Blood pressure Systolic (24hrs), Av , Min:110 , Max:145        " Diastolic (24hrs), Av, Min:67, Max:95      Pulse Pulse  Av  Min: 65  Max: 102   Respirations Resp  Av  Min: 18  Max: 18   Pulse oximetry SpO2  Av.5 %  Min: 94 %  Max: 97 %       No intake or output data in the 24 hours ending 21 1215       Cardiovascular:   Normal apical impulse, regular rate and rhythm, normal S1 and S2, no S3 or S4, and no murmur noted   Chest clear.  No peripheral oedema         Data:     Labs:  Lab Results   Component Value Date     2021     2020    Lab Results   Component Value Date    CHLORIDE 110 2021    CHLORIDE 103 2020    Lab Results   Component Value Date    BUN 18 2021    BUN 13 2020      Lab Results   Component Value Date    POTASSIUM 4.0 2021    POTASSIUM 4.1 2020    Lab Results   Component Value Date    CO2 28 2021    CO2 31 2020    Lab Results   Component Value Date    CR 0.90 2021    CR 0.87 2020        Lab Results   Component Value Date    WBC 10.3 2021    HGB 14.6 2021    HCT 44.9 2021    MCV 88 2021     2021     No results found for: TROPONIN, TROPI, TROPR        Attestation:  I have reviewed today's vital signs, notes, medications, labs and imaging.         DR RAMSEY DE LA ROSA MD 2021  12:15 PM

## 2021-12-13 ENCOUNTER — APPOINTMENT (OUTPATIENT)
Dept: CARDIOLOGY | Facility: CLINIC | Age: 51
DRG: 274 | End: 2021-12-13
Attending: INTERNAL MEDICINE
Payer: COMMERCIAL

## 2021-12-13 LAB
ANION GAP SERPL CALCULATED.3IONS-SCNC: 5 MMOL/L (ref 3–14)
BUN SERPL-MCNC: 15 MG/DL (ref 7–30)
CALCIUM SERPL-MCNC: 9.1 MG/DL (ref 8.5–10.1)
CHLORIDE BLD-SCNC: 110 MMOL/L (ref 94–109)
CO2 SERPL-SCNC: 27 MMOL/L (ref 20–32)
CREAT SERPL-MCNC: 0.85 MG/DL (ref 0.66–1.25)
GFR SERPL CREATININE-BSD FRML MDRD: >90 ML/MIN/1.73M2
GLUCOSE BLD-MCNC: 97 MG/DL (ref 70–99)
POTASSIUM BLD-SCNC: 3.8 MMOL/L (ref 3.4–5.3)
SODIUM SERPL-SCNC: 142 MMOL/L (ref 133–144)

## 2021-12-13 PROCEDURE — 36415 COLL VENOUS BLD VENIPUNCTURE: CPT | Performed by: NURSE PRACTITIONER

## 2021-12-13 PROCEDURE — 75561 CARDIAC MRI FOR MORPH W/DYE: CPT | Mod: 26 | Performed by: INTERNAL MEDICINE

## 2021-12-13 PROCEDURE — 82310 ASSAY OF CALCIUM: CPT | Performed by: NURSE PRACTITIONER

## 2021-12-13 PROCEDURE — 210N000002 HC R&B HEART CARE

## 2021-12-13 PROCEDURE — 250N000013 HC RX MED GY IP 250 OP 250 PS 637: Performed by: STUDENT IN AN ORGANIZED HEALTH CARE EDUCATION/TRAINING PROGRAM

## 2021-12-13 PROCEDURE — 250N000013 HC RX MED GY IP 250 OP 250 PS 637: Performed by: NURSE PRACTITIONER

## 2021-12-13 PROCEDURE — 93005 ELECTROCARDIOGRAM TRACING: CPT

## 2021-12-13 PROCEDURE — 75561 CARDIAC MRI FOR MORPH W/DYE: CPT

## 2021-12-13 PROCEDURE — A9585 GADOBUTROL INJECTION: HCPCS | Performed by: STUDENT IN AN ORGANIZED HEALTH CARE EDUCATION/TRAINING PROGRAM

## 2021-12-13 PROCEDURE — 99232 SBSQ HOSP IP/OBS MODERATE 35: CPT | Performed by: INTERNAL MEDICINE

## 2021-12-13 PROCEDURE — 250N000013 HC RX MED GY IP 250 OP 250 PS 637: Performed by: INTERNAL MEDICINE

## 2021-12-13 PROCEDURE — 99233 SBSQ HOSP IP/OBS HIGH 50: CPT | Performed by: INTERNAL MEDICINE

## 2021-12-13 PROCEDURE — 255N000002 HC RX 255 OP 636: Performed by: STUDENT IN AN ORGANIZED HEALTH CARE EDUCATION/TRAINING PROGRAM

## 2021-12-13 RX ORDER — LIDOCAINE 40 MG/G
CREAM TOPICAL
Status: DISCONTINUED | OUTPATIENT
Start: 2021-12-13 | End: 2021-12-14 | Stop reason: HOSPADM

## 2021-12-13 RX ORDER — GADOBUTROL 604.72 MG/ML
14 INJECTION INTRAVENOUS ONCE
Status: COMPLETED | OUTPATIENT
Start: 2021-12-13 | End: 2021-12-13

## 2021-12-13 RX ORDER — SODIUM CHLORIDE, SODIUM LACTATE, POTASSIUM CHLORIDE, CALCIUM CHLORIDE 600; 310; 30; 20 MG/100ML; MG/100ML; MG/100ML; MG/100ML
INJECTION, SOLUTION INTRAVENOUS CONTINUOUS
Status: DISCONTINUED | OUTPATIENT
Start: 2021-12-13 | End: 2021-12-14 | Stop reason: HOSPADM

## 2021-12-13 RX ADMIN — LISINOPRIL 5 MG: 5 TABLET ORAL at 08:34

## 2021-12-13 RX ADMIN — ESCITALOPRAM 15 MG: 5 TABLET, FILM COATED ORAL at 20:41

## 2021-12-13 RX ADMIN — LORAZEPAM 0.5 MG: 0.5 TABLET ORAL at 11:04

## 2021-12-13 RX ADMIN — SIMVASTATIN 20 MG: 20 TABLET, FILM COATED ORAL at 20:41

## 2021-12-13 RX ADMIN — GADOBUTROL 14 ML: 604.72 INJECTION INTRAVENOUS at 13:11

## 2021-12-13 ASSESSMENT — ACTIVITIES OF DAILY LIVING (ADL)
ADLS_ACUITY_SCORE: 4

## 2021-12-13 ASSESSMENT — MIFFLIN-ST. JEOR: SCORE: 1902.72

## 2021-12-13 NOTE — PLAN OF CARE
A&OX4, RA, denies pain. Tele- SR, on regular diet. Up independent.  LS clear, CPAP overnight. NPO since 0400, rested comfortably this night.

## 2021-12-13 NOTE — PROGRESS NOTES
[facility]    Medicine Progress Note - Hospitalist Service       Date of Admission:  12/9/2021    Assessment & Plan         Neptali Ospina is a 51 year old male with past medical history significant for anxiety, dyslipidemia, DANIELLE, DARLING, and obesity admitted on 12/9/2021 with ventricular tachycardia.      Seen in clinic on 11/16 with palpitations. He was placed on a Zio patch at that time. The zio patch data was interpreted today and he was found to have 494 runs of VT with duration from 10 beats to over three minutes, with an average rate of ~250bmp. Dr. Hutton with EP saw the patient urgently in clinic and recommended admission to the hospital for evaluation with ECHO, coronary angiogram and EP study. EKG here shows sinus rhythm with occasional PVCs.     Sustained Monomorphic Ventricular Tachycardia, Suspect RV outflow tract VT  HTN - Noted by patient in clinic and occasionally at home. Notes definite component of white coat hypertension.     * Echo unremarkable. Nl RV/LV. No significant valve abnormalities. Mild aortic root dilatation.   * Angiogram on 12/10/21 revealed normal coronary arteries.       Appreciate cardiology consultation:    Dr. Saavedra recommends avoiding beta-blocker for now. Will plan to start this AFTER EP study for rate and pressure control.     Started lisinopril 5 mg on 12/11/21 for HTN; some blood pressure readings are above goal    Anxiety   Pt reports a hx of severe anxiety, that is currently exacerbated by his current situation and hospitalization.     Psychiatry increased PTA escitalopram to 15 mg, may increase further to 20 mg after 1-2 weeks.     Recommend cognitive-behavioral therapy post discharge.     Lorazepam 0.5mg po q4h PRN for anxiety while in the hospital. Has used twice since admission, last 12/10.      Dyslipidemia  Low HDL and elevated triglycerides on recent testing     Continue PTA ASA and simvastatin      Obesity   DANIELLE  Body mass index is 36.49  "kg/m .     CPAP at night      Diet: Regular Diet Adult    DVT Prophylaxis: ambulate, on ASA.   Thomas Catheter: Not present  Central Lines: None  Code Status: Full Code      Disposition Plan   Expected Discharge: 12/14/2021 within 2 days after cardiac evaluation.    Anticipated discharge location:  Awaiting care coordination huddle  Delays:         The patient's care was discussed with the bedside RN and patient  Marbella Snyder MD  Hospitalist Service  [facility]  Securely message with the Vocera Web Console (learn more here)  Text page via Live[employer]amp Paging/Directory        Clinically Significant Risk Factors Present on Admission                ______________________________________________________________________    Interval History   \"It was a little stuffy and there (MRI machine), but I made it.\"  Patient has no new complaints.  He denies chest pain or shortness of breath.  He is bored.    Data reviewed today: I reviewed all medications, new labs and imaging results over the last 24 hours. I personally reviewed telemetry showing normal sinus rhythm    Physical Exam   Vital Signs: Temp: 98.6  F (37  C) Temp src: Oral BP: (!) 151/87 Pulse: 78   Resp: 18 SpO2: 96 % O2 Device: None (Room air)    Weight: 235 lbs 8 oz  Constitutional: Awake, alert  Neuropsyche: Moves both arms and both legs, exam is nonfocal.  Mood is very pleasant  Respiratory:  Breathing comfortably, good air exchange, no wheezes, no crackles.   Cardiovascular:  Regular rate and rhythm, no edema.  GI:  soft, NT/ND, BS normal  Skin/Integumen: No rash on exposed skin    Data   Recent Labs   Lab 12/12/21  0725 12/10/21  1205 12/09/21  1321   WBC  --   --  10.3   HGB 14.6  --  15.1   MCV  --   --  88   PLT  --   --  178     --  141   POTASSIUM 4.0  --  3.8   CHLORIDE 110*  --  107   CO2 28  --  26   BUN 18  --  18   CR 0.90 0.93 0.86   ANIONGAP 3  --  8   TD 8.6  --  9.4   GLC 95  --  124*   ALBUMIN  --   --  4.0   PROTTOTAL "  --   --  7.8   BILITOTAL  --   --  0.4   ALKPHOS  --   --  63   ALT  --   --  67   AST  --   --  32     No results found for this or any previous visit (from the past 24 hour(s)).  Medications     sodium chloride 100 mL/hr at 12/10/21 1127       escitalopram  15 mg Oral QPM     lisinopril  5 mg Oral Daily     simvastatin  20 mg Oral At Bedtime     sodium chloride (PF)  3 mL Intracatheter Q8H

## 2021-12-13 NOTE — PLAN OF CARE
Neptali has frequent 1-5 beats runs of ventricular tachycardia. He sometimes feels palpitations, but otherwise asymptomatic. Otherwise SR. Other VSS on room air. Up independently. He is pleasant, cooperative, anxious. Plan for EP consult and cardiac MRI tomorrow.

## 2021-12-13 NOTE — PROGRESS NOTES
[facility]  EP Cardiology Progress Note  Date of Service: 2021  Primary Cardiologist: Dr. Anni Greereffie Ospina is a 51 year old male with past medical history significant for Anxiety, DARLING, obesity, palpitations and DANIELLE admitted on 2021 with sustained VT on zio patch.  Pt saw Dr. Hutton on an urgent basis in the clinic as he had wore a zio patch due to symptoms of fluttering. The final results are not in chart however Dr. Hutton did review the results and noted multiple episodes of sustained fast VT with duration of 10-15 beats to over 3 minutes with an average heart rate of 250 bpm or higher.  Admission to the hospital was recommended.        ECHO (2021)  Left ventricular systolic function is normal. The visual ejection fraction is 55-60%.  No regional wall motion abnormalities noted. The right ventricle is normal in structure, function and size. No evidence for significant valvular pathology. Mildly dilated aortic root (sinus of valsalva) 4.1 cm and mildly dilated ascending aorta, 3.8 cm.    Coronary catheretization (2021) showed normal coronary arteries.       Interval History  Telemetry sinus rhythm with PVC longest NSVT run 11 beats at 198 bpm.     Pt is in room with his wife and is very anxious.  He stated when he looked at the monitor he could track the stress at work or when he was consuming a [employer] double shot espresso.     He denies chest pain, shortness of breath, dizziness or lightheadedness.     Assessment  Recurrent sustained monomorphic VT    Zio patch showed multiple episodes of sustained monomorphic VT with rates 250 bpm or higher    Family hx of SCD.  Father  in his sleep in his 60's.  Family was told he  of an MI.     ECHO showed normal EF and function    Angiogram showed normal coronary arteries.      No BB at this time    ECG shows sinus rhythm with PVC's.      Palpitations    At age of 21 years old, had a work up unknown what this  included    Was given propanolol for treatment but did not ever use.     Hypertension    Elevated    lisinopril 5 mg daily started during hospitalization.      Severe Anxiety    PTA escitalopram 10 mg daily     Psychiatry following while hospitalized.       Mild aortic root dilatation    ascending aorta, 3.8 cm per ECHO (12/2021)     Plan  1. Plan for MRI  2. Plan for EP study with possible ablation.  We discussed the risks, benefits and indications of proceeding with electrophysiology study and possible ablation, including but not limited to the use of conscious sedation, peripheral vessel injury and discomfort, heart attack, death, stroke, cardiac puncture and/or tamponade, pneumothorax, [id number]-arrhythmias requiring further treatment and damage to existing electrical structures that may require permanent pacemaker implantation. We reviewed that additional procedures may be required. We briefly discussed post-procedural restrictions and post-procedural discomfort.  He and his wife voiced understanding and is willing to proceed.   Consent was signed             RONALD Ivey [facility]  Text Page  (M-F 7:30 am - 4:00 pm)        Physical Exam   Temp: 98.6  F (37  C) Temp src: Oral BP: (!) 151/87 Pulse: 78   Resp: 18 SpO2: 96 % O2 Device: None (Room air)    Vitals:    12/11/21 0500 12/12/21 0627 12/13/21 0600   Weight: 106.1 kg (234 lb) 106.3 kg (234 lb 6.4 oz) 106.8 kg (235 lb 8 oz)       GEN:  In general, this is a well nourished male in no acute distress  HEENT:  Pupils normal size, equal, and round. Sclerae nonicteric. Clear oropharynx. Mucous membranes moist,  no cyanosis.  NECK: Supple, no asymmetry, masses, or scars appreciated. Trachea midline.   C/V:  Regular rate and rhythm, no murmur, rub or gallop. No S3 or RV heave.   RESP: Respirations are unlabored. No use of accessory muscles. Clear to auscultation bilaterally without wheezing, rales, or rhonchi.  GI: Abdomen soft, nontender,  nondistended. bowel sounds normal.  EXTREM: no LE edema. No cyanosis or clubbing.  MS: Large joints without obvious swelling or erythema.   VASC: Radial and dorsalis pedis are normal in volumes and symmetric bilaterally.   NEURO: Alert and oriented, cooperative.No obvious focal deficits.   PSYCH: Normal affect.  SKIN: Warm and dry. No rashes or petechiae appreciated.      Medications     sodium chloride 100 mL/hr at 12/10/21 1127       escitalopram  15 mg Oral QPM     lisinopril  5 mg Oral Daily     simvastatin  20 mg Oral At Bedtime     sodium chloride (PF)  3 mL Intracatheter Q8H       Data   Most Recent 3 CBC's:Recent Labs   Lab Test 12/12/21  0725 12/09/21  1321 04/20/16  0821   WBC  --  10.3 6.6   HGB 14.6 15.1 14.9   MCV  --  88 89   PLT  --  178 150     Most Recent 3 BMP's:Recent Labs   Lab Test 12/12/21  0725 12/10/21  1205 12/09/21  1321 11/16/21  0927     --  141 140   POTASSIUM 4.0  --  3.8 4.6   CHLORIDE 110*  --  107 107   CO2 28  --  26 30   BUN 18  --  18 18   CR 0.90 0.93 0.86 0.94   ANIONGAP 3  --  8 3   TD 8.6  --  9.4 9.9   GLC 95  --  124* 108*       Most Recent TSH and T4:Recent Labs   Lab Test 12/09/21  1321   TSH 2.38

## 2021-12-14 PROCEDURE — 93654 COMPRE EP EVAL TX VT: CPT | Performed by: INTERNAL MEDICINE

## 2021-12-14 PROCEDURE — 250N000011 HC RX IP 250 OP 636: Performed by: INTERNAL MEDICINE

## 2021-12-14 PROCEDURE — 99232 SBSQ HOSP IP/OBS MODERATE 35: CPT | Performed by: INTERNAL MEDICINE

## 2021-12-14 PROCEDURE — 99153 MOD SED SAME PHYS/QHP EA: CPT | Performed by: INTERNAL MEDICINE

## 2021-12-14 PROCEDURE — 02K83ZZ MAP CONDUCTION MECHANISM, PERCUTANEOUS APPROACH: ICD-10-PCS | Performed by: INTERNAL MEDICINE

## 2021-12-14 PROCEDURE — 02583ZZ DESTRUCTION OF CONDUCTION MECHANISM, PERCUTANEOUS APPROACH: ICD-10-PCS | Performed by: INTERNAL MEDICINE

## 2021-12-14 PROCEDURE — 250N000013 HC RX MED GY IP 250 OP 250 PS 637: Performed by: INTERNAL MEDICINE

## 2021-12-14 PROCEDURE — 99152 MOD SED SAME PHYS/QHP 5/>YRS: CPT | Performed by: INTERNAL MEDICINE

## 2021-12-14 PROCEDURE — C1730 CATH, EP, 19 OR FEW ELECT: HCPCS | Performed by: INTERNAL MEDICINE

## 2021-12-14 PROCEDURE — 272N000001 HC OR GENERAL SUPPLY STERILE: Performed by: INTERNAL MEDICINE

## 2021-12-14 PROCEDURE — 250N000009 HC RX 250: Performed by: INTERNAL MEDICINE

## 2021-12-14 PROCEDURE — 93623 PRGRMD STIMJ&PACG IV RX NFS: CPT | Performed by: INTERNAL MEDICINE

## 2021-12-14 PROCEDURE — 210N000002 HC R&B HEART CARE

## 2021-12-14 PROCEDURE — 250N000013 HC RX MED GY IP 250 OP 250 PS 637: Performed by: NURSE PRACTITIONER

## 2021-12-14 PROCEDURE — C1733 CATH, EP, OTHR THAN COOL-TIP: HCPCS | Performed by: INTERNAL MEDICINE

## 2021-12-14 PROCEDURE — 250N000013 HC RX MED GY IP 250 OP 250 PS 637: Performed by: STUDENT IN AN ORGANIZED HEALTH CARE EDUCATION/TRAINING PROGRAM

## 2021-12-14 PROCEDURE — 93005 ELECTROCARDIOGRAM TRACING: CPT

## 2021-12-14 PROCEDURE — 99233 SBSQ HOSP IP/OBS HIGH 50: CPT | Mod: 25 | Performed by: INTERNAL MEDICINE

## 2021-12-14 PROCEDURE — C1732 CATH, EP, DIAG/ABL, 3D/VECT: HCPCS | Performed by: INTERNAL MEDICINE

## 2021-12-14 RX ORDER — ISOPROTERENOL HYDROCHLORIDE 0.2 MG/ML
INJECTION, SOLUTION INTRAVENOUS
Status: DISCONTINUED | OUTPATIENT
Start: 2021-12-14 | End: 2021-12-14 | Stop reason: HOSPADM

## 2021-12-14 RX ORDER — OXYCODONE AND ACETAMINOPHEN 5; 325 MG/1; MG/1
1 TABLET ORAL EVERY 4 HOURS PRN
Status: DISCONTINUED | OUTPATIENT
Start: 2021-12-14 | End: 2021-12-14 | Stop reason: ALTCHOICE

## 2021-12-14 RX ORDER — FENTANYL CITRATE 50 UG/ML
INJECTION, SOLUTION INTRAMUSCULAR; INTRAVENOUS
Status: DISCONTINUED | OUTPATIENT
Start: 2021-12-14 | End: 2021-12-14 | Stop reason: HOSPADM

## 2021-12-14 RX ADMIN — ESCITALOPRAM 15 MG: 5 TABLET, FILM COATED ORAL at 20:16

## 2021-12-14 RX ADMIN — LISINOPRIL 5 MG: 5 TABLET ORAL at 09:06

## 2021-12-14 RX ADMIN — SIMVASTATIN 20 MG: 20 TABLET, FILM COATED ORAL at 20:16

## 2021-12-14 ASSESSMENT — ACTIVITIES OF DAILY LIVING (ADL)
ADLS_ACUITY_SCORE: 4

## 2021-12-14 ASSESSMENT — MIFFLIN-ST. JEOR: SCORE: 1890.02

## 2021-12-14 NOTE — PROGRESS NOTES
EP study and ablation:  Frequent PVCs and nonsustained VT at baseline. PVC and VT had slight variation of the QRS morphology at the precordial leads.  Isoproterenol infusion up to 5 mcg/min did not have apparent effect on the arrhythmias.  No inducible VT by ventricular stimulation up to triple extrastimuli.  PVC and VT mapped to the RVOT near the anterior portion below the pulmonary valve.  Successful ablation.  No PVC or VT during monitoring for over 30 minutes post ablation.  No complications.

## 2021-12-14 NOTE — PLAN OF CARE
Pt VSS on RA. Tele SR, some runs of vtach throughout the day, asymptomatic. A&Ox4. Up ind, using bathroom. Cardiac MRI completed. Denies pain. Plan for ep study tomorrow, pt to be NPO at midnight. Continue to monitor.

## 2021-12-14 NOTE — PRE-PROCEDURE
GENERAL PRE-PROCEDURE:   Procedure:  EP study and possible ablation  Date/Time:  12/14/2021 1:25 PM    Verbal consent obtained?: Yes    Written consent obtained?: Yes    Risks and benefits: Risks, benefits and alternatives were discussed    Consent given by:  Patient  Patient states understanding of procedure being performed: Yes    Patient's understanding of procedure matches consent: Yes    Procedure consent matches procedure scheduled: Yes    Expected level of sedation:  Moderate  Appropriately NPO:  Yes  ASA Class:  3  Mallampati  :  Grade 1- soft palate, uvula, tonsillar pillars, and posterior pharyngeal wall visible  Lungs:  Lungs clear with good breath sounds bilaterally  Heart:  Normal heart sounds and rate  History & Physical reviewed:  History and physical reviewed and no updates needed  Statement of review:  I have reviewed the lab findings, diagnostic data, medications, and the plan for sedation

## 2021-12-14 NOTE — PLAN OF CARE
..Neuro- AxO 4  Tele/Cardiac- SR w/ episodes of less than 5 beats of V tach  Resp- CPAP  Activity- INd  Pain- Denies  Drips- None  Drains/Tubes- PIV x 1   Skin- Obese   GI/- WDL  Aggression Color- Green   COVID status- Neg   Plan- NPO @ midnight, EP study

## 2021-12-14 NOTE — PROGRESS NOTES
"[facility]    Medicine Progress Note - Hospitalist Service       Date of Admission:  12/9/2021    Assessment & Plan         Neptali Ospina is a 51 year old male with past medical history significant for anxiety, dyslipidemia, DANIELLE, DARLING, and obesity admitted on 12/9/2021 with ventricular tachycardia.      Seen in clinic on 11/16 with palpitations. He was placed on a Zio patch at that time. The zio patch data was interpreted today and he was found to have 494 runs of VT with duration from 10 beats to over three minutes, with an average rate of ~250bmp. Dr. Hutton with EP saw the patient urgently in clinic and recommended admission to the hospital for evaluation with ECHO, coronary angiogram and EP study. EKG here shows sinus rhythm with occasional PVCs.     Sustained Monomorphic Ventricular Tachycardia, Suspect RV outflow tract VT  HTN - Noted by patient in clinic and occasionally at home. Notes definite component of white coat hypertension.     * Echo unremarkable. Nl RV/LV. No significant valve abnormalities. Mild aortic root dilatation.   * Angiogram on 12/10/21 revealed normal coronary arteries.       Appreciate cardiology consultation:    Had EP study today showing, \"PVC and VT mapped to the RVOT near the anterior portion below the pulmonary valve. Successful ablation.\"    Per Dr. Sorto, \"If the arrhythmia cannot be induced or successfully ablated, options would include antiarrhythmic drug therapy with or without ICD. Outpatient genetic testing may be considered later.\"    Started lisinopril 5 mg on 12/11/21 for HTN; some blood pressure readings are above goal    Anxiety   Pt reports a hx of severe anxiety, that is currently exacerbated by his current situation and hospitalization.     Psychiatry increased PTA escitalopram to 15 mg, may increase further to 20 mg after 1-2 weeks.     Recommend cognitive-behavioral therapy post discharge.     Lorazepam 0.5mg po q4h PRN for anxiety while " "in the hospital.      Dyslipidemia  Low HDL and elevated triglycerides on recent testing     Continue PTA ASA and simvastatin      Obesity   DANIELLE  Body mass index is 36.49 kg/m .     CPAP at night      Diet: NPO per Anesthesia Guidelines for Procedure/Surgery Except for: Meds    DVT Prophylaxis: ambulate, on ASA.   Thomas Catheter: Not present  Central Lines: None  Code Status: Full Code      Disposition Plan   Expected Discharge: possibly ready for discharge 12/15, depending on need for anti-arrhythmic therapy adjustments and/or AICD  Anticipated discharge location:  Awaiting care coordination huddle  Delays:         The patient's care was discussed with the bedside RN and patient    Marbella Snyder MD  Hospitalist Service  [facility]  Securely message with the Vocera Web Console (learn more here)  Text page via [facility] Paging/Directory        Clinically Significant Risk Factors Present on Admission                 ______________________________________________________________________    Interval History   \"I just want to get it over with.\"  Patient is bored, (understandably) dislikes long hospital stay.  He denies chest pain or shortness of breath.    Data reviewed today: I reviewed all medications, new labs and imaging results over the last 24 hours. I personally reviewed telemetry showing SR w/ episodes of less than 5 beats of wide complex tachycardia.    Physical Exam   Vital Signs: Temp: 98.5  F (36.9  C) Temp src: Oral BP: 126/76 Pulse: 75   Resp: 15 SpO2: 95 % O2 Device: None (Room air)    Weight: 232 lbs 11.2 oz  Constitutional: Awake, alert  Neuropsyche: Moves both arms and both legs, exam is nonfocal.  Mood is very pleasant  Respiratory:  Breathing comfortably, good air exchange, no wheezes, no crackles.   Cardiovascular:  Regular rate and rhythm  GI:  soft, NT/ND, BS normal  Skin/Integumen: No rash on exposed skin    Data   Recent Labs   Lab 12/13/21  0934 12/12/21  0725 " 12/10/21  1205 21  1321   WBC  --   --   --  10.3   HGB  --  14.6  --  15.1   MCV  --   --   --  88   PLT  --   --   --  178    141  --  141   POTASSIUM 3.8 4.0  --  3.8   CHLORIDE 110* 110*  --  107   CO2 27 28  --  26   BUN 15 18  --  18   CR 0.85 0.90 0.93 0.86   ANIONGAP 5 3  --  8   TD 9.1 8.6  --  9.4   GLC 97 95  --  124*   ALBUMIN  --   --   --  4.0   PROTTOTAL  --   --   --  7.8   BILITOTAL  --   --   --  0.4   ALKPHOS  --   --   --  63   ALT  --   --   --  67   AST  --   --   --  32     Recent Results (from the past 24 hour(s))   MR Cardiac w contrast    Narrative                                                     [facility]                                                     CMR Report      MRN:                  [id number]                                  Name:         JOSE ANDERSON                                  :                  1970                                  Scan Date:   2021 11:34:16                                  Electronically signed by Alessandro Levine 2021-Dec-13 13:59:28    SUMMARY   ==========================================================================================================    Clinical history: VT  Comparison CMR: none    1. The LV is normal in cavity size and wall thickness. The global systolic function is normal. The LVEF is  61 %. There are no regional wall motion abnormalities.    2. The RV is normal in cavity size. The global systolic function is normal. The RVEF is 58 %.     3. No evidence of myocardial edema on T2 weighted images/T2 mapping.    4 Late gadolinium enhancement imaging shows small size mid inferior wall subepicardial to mid myocardial  delayed enhancement.     5. There is no pericardial effusion.    6. There is no intracardiac thrombus.    7. Mildly dilated aortic root.      CONCLUSIONS:   1. Normal LV and RV systolic functions. LVEF 61%.  2. Late gadolinium enhancement imaging shows small size mid inferior wall  subepicardial to mid myocardial  delayed enhancement. No evidence of myocardial edema on T2 weighted images. Together these findings may  represent prior myocarditis.    CORE EXAM   ==========================================================================================================    MEASUREMENTS   ----------------------------------------------------------------------------------------      VOLUMETRIC ANALYSIS       ----------------------------------------------  .------------------------------------------------------------.                    LV     Reference   RV     Reference    +------+-----------+-------+------------+-------+------------+   EDV   ml         173.7   (113-196)  157.3   (111-210)          ml/m^2      79.4   (62-97)     71.9   ()     ESV   ml          67.1   (29-74)     65.5   (25-85)            ml/m^2      30.7   (15-37)     29.9   (13-42)      CO    L/min       8.74               7.53                      L/min/m^2   3.99               3.44                MASS  g          116.6   (107-184)                             g/m^2       53.3   (57-91)                         SV    ml         106.6   ()    91.8   ()           ml/m^2      48.7   (41-65)     41.9   (38-70)      EF    %           61.4   (58-76)     58.4   (53-79)     '------+-----------+-------+------------+-------+------------'            CARDIAC OUTPUT HR:  82.0 BPM      AORTIC ROOT DIMENSIONS       ----------------------------------------------          SINUS OF VALSALVA:  4.2 cm          AORTIC ROOT SIZE:  MILDLY DILATED      ANATOMY   ----------------------------------------------------------------------------------------      LEFT VENTRICLE       ----------------------------------------------          WALL THICKNESS:  Normal        INTERATRIAL SEPTUM       ----------------------------------------------   ATRIAL SEPTUM: Normal          LEFT ATRIUM       ----------------------------------------------          CAVITY SIZE:  Normal        RIGHT ATRIUM       ----------------------------------------------          CAVITY SIZE:  Normal        PERICARDIUM       ----------------------------------------------          EFFUSION:  None      VALVES   ----------------------------------------------------------------------------------------      AORTIC VALVE       ----------------------------------------------          AORTIC VALVE LEAFLETS:  [place]        PULMONIC VALVE       ----------------------------------------------          PULMONIC REGURGITATION:  Present      17 SEGMENT   ----------------------------------------------------------------------------------------  .-----------------------------------------------------------------------------------------.   Segments            Wall Motion  Hyperenhancement  Stress Perfusion  Interpretation   +--------------------+-------------+------------------+------------------+----------------+   Base Anterior                                                                          Base Anteroseptal                                                                      Base Inferoseptal                                                                      Base Inferior                                                                          Base Inferolateral                                                                     Base Anterolateral                                                                     Mid Anterior                                                                           Mid Anteroseptal                                                                       Mid Inferoseptal                                                                       Mid Inferior                     1-25%                                                  Mid Inferolateral                                                                      Mid Anterolateral                                                                      Apical Anterior                                                                        Apical Septal                                                                          Apical Inferior                                                                        Apical Lateral                                                                         [place]                                                                                  +--------------------+-------------+------------------+------------------+----------------+   RV Segments         Wall Motion  Hyperenhancement  Stress Perfusion  Interpretation   +--------------------+-------------+------------------+------------------+----------------+   RV Basal Anterior                                                                      RV Basal Inferior                                                                      RV Mid                                                                                 RV Apical                                                                             '--------------------+-------------+------------------+------------------+----------------'        FINDINGS       ----------------------------------------------          LV SCAR SIZE (17 SEGMENT):  1 %          ADDITIONAL FINDINGS:          mildly dilated main pulmonary artery 3.3 cm       SCAN INFO   ==========================================================================================================    GENERAL   ----------------------------------------------------------------------------------------      CONTRAST AGENT       ----------------------------------------------          TYPE:  Gadavist          GD CONCENTRATION:  0.5  M          VOLUME ADMINISTERED:  14 ml          DOSAGE:  0.07 mmol/kg        VITALS       ----------------------------------------------          HEIGHT:  68.00 in          HEIGHT:  172.72 cm          WEIGHT:  234.99 lbs          WEIGHT:  106.59 kgs          BSA:  2.19 m^2        PULSE SEQUENCES       ----------------------------------------------          SSFP cine, 2D LGE segmented, 2D LGE single-shot, T2-weighted imaging, Pre-contrast T1 mapping,          Post-contrast T1 mapping, T2 mapping         SETUP       ----------------------------------------------          REASON(S) FOR SCAN:  Other abnormal test...           OTHER, DESCRIBE::          VT          ATTENDING PHYSICIAN:  REBEKAH WATSON      Report generated by [employer], a product of [employer]     Medications     lactated ringers       sodium chloride Stopped (12/13/21 0001)       escitalopram  15 mg Oral QPM     lisinopril  5 mg Oral Daily     simvastatin  20 mg Oral At Bedtime     sodium chloride (PF)  3 mL Intracatheter Q8H

## 2021-12-14 NOTE — PROGRESS NOTES
[facility]  EP Cardiology Progress Note  Date of Service: 2021  Primary Cardiologist: Dr. Hutton      Neptali Ospina is a 51 year old male with past medical history significant for Anxiety, DARLING, obesity, palpitations and DANIELLE admitted on 2021 with sustained VT on zio patch.  Pt saw Dr. Hutton on an urgent basis in the clinic as he had wore a zio patch due to symptoms of fluttering.  Dr. Hutton reviewed results and noted multiple episodes of sustained fast VT with duration of 10-15 beats to over 3 minutes with an average heart rate of 250 bpm or higher.  Admission to the hospital was recommended.  His coronary catheretization showed normal coronary arteries.  His ECHO showed EF 55-60%.      MRI  (2021) Normal LV and RV systolic functions. LVEF 61%.  Late gadolinium enhancement imaging shows small size mid inferior wall subepicardial to mid myocardial delayed enhancement. No evidence of myocardial edema on T2 weighted images. Together these findings may represent prior myocarditis.    Interval History  Blood pressure elevated   Pt has no complaints  Telemetry shows sinus rhythm with episodes of NSVT longest 14 beats.       Assessment  Recurrent sustained monomorphic VT    Zio patch showed multiple episodes of sustained monomorphic VT with rates 250 bpm or higher    Family hx of SCD.  Father  in his sleep in his 60's.  Family was told he  of an MI.     ECHO showed normal EF and function    Angiogram showed normal coronary arteries.      No BB at this time    ECG shows sinus rhythm with PVC's.      MRI shows scar and could represent genetic cardiomyopathy in the setting of his father dying of sudden cardiac death.     Palpitations    At age of 21 years old, had a work up unknown of what was included    Was given propanolol for treatment but did not ever use.      Hypertension    Elevated    lisinopril 5 mg daily started during hospitalization.      Severe Anxiety    PTA  escitalopram 10 mg daily     Psychiatry following while hospitalized.      Plan  1. MRI shows a small scar which could represent genetic cardiomyopathy particularly in the setting of his father dying of sudden cardiac death.  Will refer genetics as an outpatient.    2. NPO for EP study             RONALD Ivey [facility]  Text Page  (M-F 7:30 am - 4:00 pm)    Physical Exam   Temp: 98.5  F (36.9  C) Temp src: Oral BP: 126/76 Pulse: 75   Resp: 15 SpO2: 95 % O2 Device: None (Room air)    Vitals:    12/12/21 0627 12/13/21 0600 12/14/21 0629   Weight: 106.3 kg (234 lb 6.4 oz) 106.8 kg (235 lb 8 oz) 105.6 kg (232 lb 11.2 oz)       GEN:  In general, this is a well nourished male in no acute distress.  Patient ambulatory.  HEENT:  Pupils normal size, equal, and round. Sclerae nonicteric. Clear oropharynx. Mucous membranes moist,  no cyanosis.  NECK: Supple, no asymmetry, masses, or scars appreciated. Trachea midline.  C/V:  Regular rate and rhythm, no murmur, rub or gallop. No S3 or RV heave.   RESP: Respirations are unlabored. No use of accessory muscles. Clear to auscultation bilaterally without wheezing, rales, or rhonchi.  GI: Abdomen soft, nontender, nondistended. bowel sounds normal.  EXTREM: no LE edema. No cyanosis or clubbing.  MS: Large joints without obvious swelling or erythema.   VASC: Radial and dorsalis pedis are normal in volumes and symmetric bilaterally.   NEURO: Alert and oriented, cooperative.  No obvious focal deficits.   PSYCH: Normal affect.  SKIN: Warm and dry. No rashes or petechiae appreciated.    Medications     lactated ringers       sodium chloride Stopped (12/13/21 0001)       escitalopram  15 mg Oral QPM     lisinopril  5 mg Oral Daily     simvastatin  20 mg Oral At Bedtime     sodium chloride (PF)  3 mL Intracatheter Q8H       Data   Most Recent 3 CBC's:Recent Labs   Lab Test 12/12/21  0725 12/09/21  1321 04/20/16  0821   WBC  --  10.3 6.6   HGB 14.6 15.1 14.9   MCV  --  88 89    PLT  --  178 150     Most Recent 3 BMP's:Recent Labs   Lab Test 12/13/21  0934 12/12/21  0725 12/10/21  1205 12/09/21  1321    141  --  141   POTASSIUM 3.8 4.0  --  3.8   CHLORIDE 110* 110*  --  107   CO2 27 28  --  26   BUN 15 18  --  18   CR 0.85 0.90 0.93 0.86   ANIONGAP 5 3  --  8   TD 9.1 8.6  --  9.4   GLC 97 95  --  124*

## 2021-12-15 VITALS
BODY MASS INDEX: 35.22 KG/M2 | HEIGHT: 68 IN | WEIGHT: 232.4 LBS | RESPIRATION RATE: 16 BRPM | OXYGEN SATURATION: 95 % | SYSTOLIC BLOOD PRESSURE: 142 MMHG | HEART RATE: 75 BPM | DIASTOLIC BLOOD PRESSURE: 97 MMHG | TEMPERATURE: 98.1 F

## 2021-12-15 LAB
ATRIAL RATE - MUSE: 82 BPM
DIASTOLIC BLOOD PRESSURE - MUSE: NORMAL MMHG
INTERPRETATION ECG - MUSE: NORMAL
P AXIS - MUSE: 38 DEGREES
PR INTERVAL - MUSE: 198 MS
QRS DURATION - MUSE: 72 MS
QT - MUSE: 378 MS
QTC - MUSE: 441 MS
R AXIS - MUSE: 34 DEGREES
SYSTOLIC BLOOD PRESSURE - MUSE: NORMAL MMHG
T AXIS - MUSE: 52 DEGREES
VENTRICULAR RATE- MUSE: 82 BPM

## 2021-12-15 PROCEDURE — 99232 SBSQ HOSP IP/OBS MODERATE 35: CPT | Performed by: INTERNAL MEDICINE

## 2021-12-15 PROCEDURE — 99239 HOSP IP/OBS DSCHRG MGMT >30: CPT | Performed by: INTERNAL MEDICINE

## 2021-12-15 PROCEDURE — 250N000013 HC RX MED GY IP 250 OP 250 PS 637: Performed by: NURSE PRACTITIONER

## 2021-12-15 RX ORDER — LISINOPRIL 10 MG/1
10 TABLET ORAL DAILY
Status: DISCONTINUED | OUTPATIENT
Start: 2021-12-15 | End: 2021-12-15 | Stop reason: HOSPADM

## 2021-12-15 RX ORDER — LISINOPRIL 10 MG/1
10 TABLET ORAL DAILY
Qty: 30 TABLET | Refills: 0 | Status: SHIPPED | OUTPATIENT
Start: 2021-12-16 | End: 2021-12-28

## 2021-12-15 RX ADMIN — LISINOPRIL 10 MG: 10 TABLET ORAL at 08:19

## 2021-12-15 ASSESSMENT — ACTIVITIES OF DAILY LIVING (ADL)
ADLS_ACUITY_SCORE: 4

## 2021-12-15 ASSESSMENT — MIFFLIN-ST. JEOR: SCORE: 1888.66

## 2021-12-15 NOTE — DISCHARGE SUMMARY
[facility]  Hospitalist Discharge Summary      Date of Admission:  12/9/2021  Date of Discharge:  12/15/2021  Discharging Provider: Marbella Snyder MD      Discharge Diagnoses   Recurrent, sustained monomorphic ventricular tachycardia  Hypertension  Severe anxiety    Follow-ups Needed After Discharge   Follow-up Appointments     Follow-up and recommended labs and tests       Follow up with primary care provider, Padmini Thompson, within 7 days to   evaluate medication change and for hospital follow- up.  The following   labs/tests are recommended: BMP.  Discuss dose of Lisinopril (uptitrate?)   at that visit.         Please review anxiolytic medications with patient, increase Lexapro if he has uncontrolled anxiety.    Unresulted Labs Ordered in the Past 30 Days of this Admission     No orders found from 11/9/2021 to 12/10/2021.          Discharge Disposition   Discharged to home  Condition at discharge: Stable      Hospital Course            Neptali Ospina is a 51 year old male with past medical history significant for anxiety, dyslipidemia, DANIELLE, DARLING, and obesity admitted on 12/9/2021 with ventricular tachycardia.      Seen in clinic on 11/16 with palpitations. He was placed on a Zio patch at that time. The zio patch data was interpreted today and he was found to have 494 runs of VT with duration from 10 beats to over three minutes, with an average rate of ~250bmp. Dr. Hutton with EP saw the patient urgently in clinic and recommended admission to the hospital for evaluation with ECHO, coronary angiogram and EP study. EKG here shows sinus rhythm with occasional PVCs.     Sustained Monomorphic Ventricular Tachycardia, Suspect RV outflow tract VT  HTN - Noted by patient in clinic and occasionally at home. Notes definite component of white coat hypertension.     * Echo unremarkable. Nl RV/LV. No significant valve abnormalities. Mild aortic root dilatation.   * Angiogram on 12/10/21 revealed  "normal coronary arteries.       Appreciate cardiology consultation:    Had EP study 12/14 showing, \"PVC and VT mapped to the RVOT near the anterior portion below the pulmonary valve. Successful ablation.\"    Per Dr. Sorto, \"If the arrhythmia cannot be induced or successfully ablated, options would include antiarrhythmic drug therapy with or without ICD. Outpatient genetic testing may be considered later.\"    Also per EP, \"Since EPS, no documented VT, but isolated PVC's/bigemeny during the night.  No beta blocker at this time. \"  Procedure appears successful.    Anxiety   Pt reports a hx of severe anxiety, that is currently exacerbated by his current situation and hospitalization.     Psychiatry recommended increasing PTA escitalopram to 15 mg, may increase further to 20 mg after 1-2 weeks.     Recommend cognitive-behavioral therapy post discharge.     We agree that in the setting of recent EP procedure, antihypertensive medication adjustments, will not change anxiolytic doses at this time.  If patient's severe anxiety persists, would consider increasing Lexapro as outlined above     Dyslipidemia  Low HDL and elevated triglycerides on recent testing     Continue PTA simvastatin     No current indication for aspirin given normal coronary arteries     Obesity   DANIELLE  Body mass index is 36.49 kg/m .     CPAP at night     Consultations This Hospital Stay   CARDIOLOGY IP CONSULT  PSYCHIATRY IP CONSULT  PHARMACY IP CONSULT  PHARMACY IP CONSULT  SMOKING CESSATION PROGRAM IP CONSULT    Code Status   Full Code    Time Spent on this Encounter   I, Marbella Snyder MD, personally saw the patient today and spent greater than 30 minutes discharging this patient.       Marbella Snyder MD  [facility]  [address] [place] [address] [address]  [facility] [address]  Phone: [phone]  ______________________________________________________________________    Physical Exam   Vital Signs: Temp: 98.1  F (36.7  C) " Temp src: Oral BP: (!) 142/97 Pulse: 75   Resp: 16 SpO2: 95 % O2 Device: None (Room air) Oxygen Delivery: 4 LPM  Weight: 232 lbs 6.4 oz  I saw and examined the patient on the date of discharge.           Primary Care Physician   Padmini Thompson    Discharge Orders      Discharge Order: F/U with Cardiac  NIKOLAI      Reason for your hospital stay    You had fast heart beats (tachycardia).     Follow-up and recommended labs and tests     Follow up with primary care provider, Padmini Thompson, within 7 days to evaluate medication change and for hospital follow- up.  The following labs/tests are recommended: BMP.  Discuss dose of Lisinopril (uptitrate?) at that visit.     Activity    Your activity upon discharge: activity as tolerated.     Leadless EKG Monitor 3 to 14 Days     Diet    Follow this diet upon discharge: Orders Placed This Encounter      Regular Diet Adult       Significant Results and Procedures   Most Recent 3 CBC's:Recent Labs   Lab Test 21  0725 21  1321 16  0821   WBC  --  10.3 6.6   HGB 14.6 15.1 14.9   MCV  --  88 89   PLT  --  178 150     Most Recent 3 BMP's:Recent Labs   Lab Test 21  0934 21  0725 12/10/21  1205 21  1321    141  --  141   POTASSIUM 3.8 4.0  --  3.8   CHLORIDE 110* 110*  --  107   CO2 27 28  --  26   BUN 15 18  --  18   CR 0.85 0.90 0.93 0.86   ANIONGAP 5 3  --  8   TD 9.1 8.6  --  9.4   GLC 97 95  --  124*     Most Recent 3 Troponin's:No lab results found.,   Results for orders placed or performed during the hospital encounter of 21   MR Cardiac w contrast    [facility]                                                     CMR Report      MRN:                  [id number]                                  Name:         JOSE ANDERSON                                  :                  1970                                  Scan Date:   2021 11:34:16                                   Electronically signed by Alessandro Levine 2021-Dec-13 13:59:28    SUMMARY   ==========================================================================================================    Clinical history: VT  Comparison CMR: none    1. The LV is normal in cavity size and wall thickness. The global systolic function is normal. The LVEF is  61 %. There are no regional wall motion abnormalities.    2. The RV is normal in cavity size. The global systolic function is normal. The RVEF is 58 %.     3. No evidence of myocardial edema on T2 weighted images/T2 mapping.    4 Late gadolinium enhancement imaging shows small size mid inferior wall subepicardial to mid myocardial  delayed enhancement.     5. There is no pericardial effusion.    6. There is no intracardiac thrombus.    7. Mildly dilated aortic root.      CONCLUSIONS:   1. Normal LV and RV systolic functions. LVEF 61%.  2. Late gadolinium enhancement imaging shows small size mid inferior wall subepicardial to mid myocardial  delayed enhancement. No evidence of myocardial edema on T2 weighted images. Together these findings may  represent prior myocarditis.    CORE EXAM   ==========================================================================================================    MEASUREMENTS   ----------------------------------------------------------------------------------------      VOLUMETRIC ANALYSIS       ----------------------------------------------  .------------------------------------------------------------.                    LV     Reference   RV     Reference    +------+-----------+-------+------------+-------+------------+   EDV   ml         173.7   (113-196)  157.3   (111-210)          ml/m^2      79.4   (62-97)     71.9   ()     ESV   ml          67.1   (29-74)     65.5   (25-85)            ml/m^2      30.7   (15-37)     29.9   (13-42)      CO    L/min       8.74               7.53                       L/min/m^2   3.99               3.44                MASS  g          116.6   (107-184)                             g/m^2       53.3   (57-91)                         SV    ml         106.6   ()    91.8   ()           ml/m^2      48.7   (41-65)     41.9   (38-70)      EF    %           61.4   (58-76)     58.4   (53-79)     '------+-----------+-------+------------+-------+------------'            CARDIAC OUTPUT HR:  82.0 BPM      AORTIC ROOT DIMENSIONS       ----------------------------------------------          SINUS OF VALSALVA:  4.2 cm          AORTIC ROOT SIZE:  MILDLY DILATED      ANATOMY   ----------------------------------------------------------------------------------------      LEFT VENTRICLE       ----------------------------------------------          WALL THICKNESS:  Normal        INTERATRIAL SEPTUM       ----------------------------------------------   ATRIAL SEPTUM: Normal         LEFT ATRIUM       ----------------------------------------------          CAVITY SIZE:  Normal        RIGHT ATRIUM       ----------------------------------------------          CAVITY SIZE:  Normal        PERICARDIUM       ----------------------------------------------          EFFUSION:  None      VALVES   ----------------------------------------------------------------------------------------      AORTIC VALVE       ----------------------------------------------          AORTIC VALVE LEAFLETS:  Trileaflet        PULMONIC VALVE       ----------------------------------------------          PULMONIC REGURGITATION:  Present      17 SEGMENT   ----------------------------------------------------------------------------------------  .-----------------------------------------------------------------------------------------.   Segments            Wall Motion  Hyperenhancement  Stress Perfusion  Interpretation    +--------------------+-------------+------------------+------------------+----------------+   Base Anterior                                                                          Base Anteroseptal                                                                      Base Inferoseptal                                                                      Base Inferior                                                                          Base Inferolateral                                                                     Base Anterolateral                                                                     Mid Anterior                                                                           Mid Anteroseptal                                                                       Mid Inferoseptal                                                                       Mid Inferior                     1-25%                                                 Mid Inferolateral                                                                      Mid Anterolateral                                                                      Apical Anterior                                                                        Apical Septal                                                                          Apical Inferior                                                                        Apical Lateral                                                                         Haskell                                                                                  +--------------------+-------------+------------------+------------------+----------------+   RV Segments         Wall Motion  Hyperenhancement  Stress Perfusion  Interpretation    +--------------------+-------------+------------------+------------------+----------------+   RV Basal Anterior                                                                      RV Basal Inferior                                                                      RV Mid                                                                                 RV Apical                                                                             '--------------------+-------------+------------------+------------------+----------------'        FINDINGS       ----------------------------------------------          LV SCAR SIZE (17 SEGMENT):  1 %          ADDITIONAL FINDINGS:          mildly dilated main pulmonary artery 3.3 cm       SCAN INFO   ==========================================================================================================    GENERAL   ----------------------------------------------------------------------------------------      CONTRAST AGENT       ----------------------------------------------          TYPE:  Gadavist          GD CONCENTRATION:  0.5 M          VOLUME ADMINISTERED:  14 ml          DOSAGE:  0.07 mmol/kg        VITALS       ----------------------------------------------          HEIGHT:  68.00 in          HEIGHT:  172.72 cm          WEIGHT:  234.99 lbs          WEIGHT:  106.59 kgs          BSA:  2.19 m^2        PULSE SEQUENCES       ----------------------------------------------          SSFP cine, 2D LGE segmented, 2D LGE single-shot, T2-weighted imaging, Pre-contrast T1 mapping,          Post-contrast T1 mapping, T2 mapping         SETUP       ----------------------------------------------          REASON(S) FOR SCAN:  Other abnormal test...           OTHER, DESCRIBE::          VT          ATTENDING PHYSICIAN:  REBEKAH WATSON      Report generated by [employer], a product of [employer]   EP Ablation    Narrative    EP ABLATION 12/14/2021 1:27  PM    NAMES OF PROCEDURES:  1. Comprehensive EP study with His bundle recording   2. Repeat EP study with isoproterenol infusion  3. 3-D intracardiac mapping  4. Ablation of an RV outflow tract ventricular tachycardia    INDICATIONS FOR PROCEDURE: Mr. Ospina is a 51-year-old man who was  admitted for frequent rapid VT and frequent PVCs with symptoms of  palpitation. He has normal LV ejection fraction and normal coronary  angiography.    PROCEDURE AND RESULTS: After the written informed consent was obtained  the patient was transported to CV lab 4 in the fasting state. The  procedure was performed under local anesthesia and sterile conditions.  I assessed the patient for sedation and ablation in the EP lab. IV  Versed and fentanyl were used for conscious sedation. Heart rate,  blood pressure and oxygen saturation were monitored by the RN under my  supervision.    The patient was in sinus rhythm at baseline. He had frequent PVCs and  the runs of nonsustained ventricular tachycardia. Both the PVCs and  nonsustained VT have similar QRS morphology with slight variation  ______ transition of the precordial leads.    He was given IV isoproterenol infusion after the baseline recording.  Up to 5 mcg/m of isoproterenol in fusion the patient did not have  apparent increase of the PVC frequency or VT frequency.    The femoral vein was accessed percutaneously and two catheters were  introduced. The quadripolar catheter was placed into the His bundle  region, RV apex and right atrium for His bundle recording and atrial  as well as ventricular stimulation. The AH and HV intervals were  normal. Atrial stimulation did not induce SVT and had no apparent  impact on the VT and PVC frequency. Ventricular stimulation by using  up to triple extrastimuli did not induce sustained on nonsustained VT.    The mapping and ablation were guided by the CARTO 3-D mapping system.  The ablation catheter was Biosense Barrett ThermoCool SF Smart  touch.  The earliest activation was identified to be at the RV outflow tract  below the pulmonary valve anteriorly. Initial ablations suppress the  ventricular tachycardia. After careful manipulation the catheter was  able to stabilize at the earliest activation site. Ablation over there  completely eliminated the PVCs and nonsustained VT.    After the ablation the patient was monitored with and without  isoproterenol for over 30 minutes. He did not have PVCs or VT.    At the end of the procedure the catheters and sheaths were removed and  local pressure was applied to the puncture sites. There was no  complication.    Echocardiogram Complete     Value    LVEF  55-60%    [facility]    [id number]  [id number]  2010^JEFERSON^DEVONTE^JARED     [facility]  Echocardiography Laboratory  [address] [place] [address]     Name: JOSE ANDERSON  MRN: [id number]  : 1970  Study Date: 12/10/2021 10:51 AM  Age: 51 yrs  Gender: Male  Patient Location: [facility]  Reason For Study: Syncope  Ordering Physician: DEVONTE GOVEA  Referring Physician: [facility]  Performed By: Manolo Anne RDCS     BSA: 2.2 m2  Height: 68 in  Weight: 240 lb  HR: 85  BP: 153/99 mmHg  ______________________________________________________________________________  Procedure  Complete Portable Echo Adult. Optison (NDC #1628-4012) given intravenously.  ______________________________________________________________________________  Interpretation Summary     1. Left ventricular systolic function is normal. The visual ejection fraction  is 55-60%.  2. No regional wall motion abnormalities noted.  3. The right ventricle is normal in structure, function and size.  4. No evidence for significant valvular pathology.  5. Mildly dilated aortic root (sinus of valsalva) 4.1 cm and mildly dilated  ascending aorta, 3.8 cm.  ______________________________________________________________________________  Left  Ventricle  The left ventricle is normal in size. Left ventricular systolic function is  normal. The visual ejection fraction is 55-60%. Grade I or early diastolic  dysfunction. No regional wall motion abnormalities noted.     Right Ventricle  The right ventricle is normal in structure, function and size.     Atria  Normal left atrial size. Right atrial size is normal. There is no color  Doppler evidence of an atrial shunt.     Mitral Valve  There is mild mitral annular calcification.     Tricuspid Valve  The tricuspid valve is normal in structure and function. There is trace  tricuspid regurgitation.     Aortic Valve  The aortic valve is normal in structure and function.     Pulmonic Valve  The pulmonic valve is normal in structure and function.     Vessels  Mild aortic root dilatation. The ascending aorta is Mildly dilated.     Pericardium  There is no pericardial effusion.     Rhythm  Sinus rhythm was noted.  ______________________________________________________________________________  MMode/2D Measurements & Calculations  IVSd: 1.2 cm     LVIDd: 4.8 cm  LVIDs: 3.2 cm  LVPWd: 0.83 cm  FS: 34.5 %  LV mass(C)d: 169.4 grams  LV mass(C)dI: 76.7 grams/m2  Ao root diam: 4.1 cm  LA dimension: 4.7 cm  asc Aorta Diam: 3.8 cm  LA/Ao: 1.1  LA Volume (BP): 69.0 ml  LA Volume Index (BP): 31.2 ml/m2  RWT: 0.34     Doppler Measurements & Calculations  MV E max meredith: 86.6 cm/sec  MV A max meredith: 115.0 cm/sec  MV E/A: 0.75  MV max P.7 mmHg  MV mean P.0 mmHg  MV V2 VTI: 33.0 cm  MV dec time: 0.25 sec  PA acc time: 0.10 sec  E/E' av.1  Lateral E/e': 7.7  Medial E/e': 14.6     ______________________________________________________________________________  Report approved by: Denita Eng 12/10/2021 11:54 AM         Cardiac Catheterization    Narrative    Normal coronary angiography       Discharge Medications   Current Discharge Medication List      START taking these medications    Details   lisinopril (ZESTRIL)  10 MG tablet Take 1 tablet (10 mg) by mouth daily  Qty: 30 tablet, Refills: 0    Associated Diagnoses: Benign essential hypertension         CONTINUE these medications which have NOT CHANGED    Details   escitalopram (LEXAPRO) 10 MG tablet Take 1 tablet (10 mg) by mouth daily  Qty: 90 tablet, Refills: 3    Associated Diagnoses: Anxiety      fish oil-omega-3 fatty acids (OMEGA 3) 1000 MG capsule Take 1 capsule by mouth 2 times daily. Lavazo fish oil  Qty: 180 capsule, Refills: 1    Associated Diagnoses: Hyperlipidemia LDL goal <160      fluticasone (FLONASE) 50 MCG/ACT nasal spray Spray 1-2 sprays into both nostrils daily  Qty: 1 Package, Refills: 1    Associated Diagnoses: Acute maxillary sinusitis      magnesium oxide 200 MG TABS Take 200 mg by mouth daily      Multiple Vitamin (MULTI-VITAMIN) per tablet Take 1 tablet by mouth daily.  Qty: 90 tablet, Refills: 3      simvastatin (ZOCOR) 20 MG tablet TAKE ONE TABLET BY MOUTH EVERY NIGHT AT BEDTIME  Qty: 90 tablet, Refills: 3    Associated Diagnoses: Mixed hyperlipidemia         STOP taking these medications       aspirin 81 MG tablet Comments:   Reason for Stopping:             Allergies   Allergies   Allergen Reactions     Nkda [No Known Drug Allergies]

## 2021-12-15 NOTE — PLAN OF CARE
Pt VSS on RA, borderline hypertensive. Tele now SR in 90s post VT ablation. Still has occasional PVC's as capture on EKG, mostly resolving. A&Ox4. Up ind, using bathroom. R groin CDI, soft, no bruit. Denies pain. Plan for discharge tomorrow, pt has indicated he would like to be an early discharge. Continue to monitor.

## 2021-12-15 NOTE — PROGRESS NOTES
MD Notification    Notified Person: MD    Notified Person Name: AJ Jim    Notification Date/Time: 12/15/21 7:43 AM     Notification Interaction: Talked with MD    Purpose of Notification: 243-2: Pt has indicated he would like to be an early discharge today, wanted to put him on your radar b/c BP is still borderline high. No vtach NOC, did have some episodes of bigemeny and isolated PVCs. Steff [id number] [id number]    Orders Received: increased lisinopril    Comments:

## 2021-12-15 NOTE — PROGRESS NOTES
EP Progress Note          Assessment and Plan:   Neptali Ospina is a 51 year old male with past medical history significant for Anxiety, non alcoholic liver disease, obesity, palpitations and DANIELLE admitted on 2021 with sustained VT on zio patch. Pt saw Dr. Hutton on an urgent basis in the clinic as he had wore a zio patch due to symptoms of fluttering.  Dr. Hutton reviewed results and noted multiple episodes of sustained fast VT with duration of 10-15 beats to over 3 minutes with an average heart rate of 250 bpm or higher.  Admission to the hospital was recommended.  His coronary catheretization showed normal coronary arteries.  His echo showed EF 55-60%.    Recurrent sustained monomorphic VT  S/P EPS and PVC ablation, mapped to the RVOT near the anterior portion below the pulmonary valve on . Procedure appears successful.  Since EPS, no documented VT, but isolated PVC's/bigemeny during the night.    -Family hx of SCD.  Father  in his sleep in his 60's.  Family was told he  of an MI.   -ECHO showed normal EF and function  -Angiogram showed normal coronary arteries.    -No BB at this time  -MRI shows scar and could represent genetic cardiomyopathy r/t hx of his father dying of sudden cardiac death.    Hypertension  Elevated this am  lisinopril increased 10 mg every day   B/P can be titrated as an outpt     Severe Anxiety  PTA escitalopram 10 mg daily   Does not appear as anxious this morning.    PLAN:  ZioPatch in 2 weeks (order placed)  Follow up in 4 weeks  Pt to keep a b/p log.  OK to discharge home from EP standpoint  Diandra Pope CNP        Interval History:   Pt relieved about EPS/ablation. Feeling well this morning. Denies chest pain,palpitations, shortness of breath, or edema.  Blood pressure slightly elevated.  Lisinopril was increased this morning.  Telemetry is sinus without ectopy.       Medications:       escitalopram  15 mg Oral QPM     lisinopril  5 mg Oral Daily     simvastatin  20 mg  "Oral At Bedtime             Review of Systems: If done, described below  The Review of Systems is negative other than noted in the HPI         Physical Exam:   Blood pressure (!) 157/97, pulse 81, temperature 98.1  F (36.7  C), temperature source Oral, resp. rate 13, height 1.727 m (5' 8\"), weight 105.4 kg (232 lb 6.4 oz), SpO2 96 %.        Vital Sign Ranges  Temperature Temp  Av.1  F (36.7  C)  Min: 98.1  F (36.7  C)  Max: 98.1  F (36.7  C)   Blood pressure Systolic (24hrs), Av , Min:126 , Max:157        Diastolic (24hrs), Av, Min:76, Max:105      Pulse Pulse  Av.6  Min: 73  Max: 96   Respirations Resp  Av.5  Min: 11  Max: 20   Pulse oximetry SpO2  Av %  Min: 94 %  Max: 98 %       No intake or output data in the 24 hours ending 12/15/21 0801    Constitutional:   in no apparent distress     Lungs:   symmetric, clear to auscultation     Cardiovascular:   regular rate and rhythm, normal S1 and S2, no S3, no S4 and no murmur noted     Extremities and Back:   No edema, right femoral access site no bruit or hematoma              Data:     Lab Results   Component Value Date    WBC 10.3 2021    HGB 14.6 2021    HCT 44.9 2021     2021     2021    POTASSIUM 3.8 2021    CHLORIDE 110 (H) 2021    CO2 27 2021    BUN 15 2021    CR 0.85 2021    GLC 97 2021    AST 32 2021    ALT 67 2021    ALKPHOS 63 2021    BILITOTAL 0.4 2021            "

## 2021-12-15 NOTE — PROGRESS NOTES
..Neuro- AxO 4  Tele/Cardiac- SR  Resp- CPAP  Activity- Ind  Pain- Denies  Drips- None  Drains/Tubes- PIV x 1   Skin- Obese, R groin site CDI  GI/- WDL  Aggression Color- Green   COVID status- Neg   Plan- Pending Early Discharge 12/15

## 2021-12-16 ENCOUNTER — PATIENT OUTREACH (OUTPATIENT)
Dept: CARE COORDINATION | Facility: CLINIC | Age: 51
End: 2021-12-16
Payer: COMMERCIAL

## 2021-12-16 ENCOUNTER — TELEPHONE (OUTPATIENT)
Dept: CARDIOLOGY | Facility: CLINIC | Age: 51
End: 2021-12-16
Payer: COMMERCIAL

## 2021-12-16 DIAGNOSIS — Z71.89 OTHER SPECIFIED COUNSELING: ICD-10-CM

## 2021-12-16 NOTE — TELEPHONE ENCOUNTER
"Patient was seen in clinic on 11/16 with palpitations. He was placed on a Zio patch at that time. The Zio patch data was interpreted on date of admission and was found to have 494 runs of VT with duration from 10 beats to over three minutes, with an average rate of ~250 bmp. Dr. Hutton with EP saw the patient urgently in clinic and recommended admission to the hospital for evaluation. PMH: HTN, HLD, DARLING, DANIELLE, obesity, severe anxiety. Echo showed EF of 55%. 12/10/21: Coronary angiogram via RFA showed normal coronary arteries. 12/14/21: VT ablation via RFV, \"PVC and VT mapped to the RVOT near the anterior portion below the pulmonary valve. Successful ablation.\" Isolated PVC's and bigeminy noted post procedure. MRI shows scar and could represent genetic cardiomyopathy r/t hx of his father dying of sudden cardiac death. Pt was started on Lisinopril and PTA ASA was discontinued at time of discharge. Pt will wear 3 day Zio Patch monitor to assess for further VT. Called patient to discuss any post hospital d/c questions he may have, review medication changes, and confirm f/u appts. Patient denied any questions regarding new medications or changes with their PTA medications. Patient denied any SOB, chest pain, fever or light headedness. RRA and RFV cardiac cath and ablation sites are without bleeding, swelling, redness or tenderness. RN confirmed with patient that he is scheduled on 12/27/21 at 0815 for Zio Patch placement, and then scheduled on 1/21/21 at 0900 with SOTO Addie Velasquez at our [facility]. Patient advised to call clinic with any cardiac related questions or concerns prior to this soto't. Patient verbalized understanding and agreed with plan. FRANKIE Hawkins RN.  "

## 2021-12-16 NOTE — PROGRESS NOTES
Clinic Care Coordination Contact    Background: Care Coordination referral placed from hospitals discharge report for reason of patient meeting criteria for a TCM outreach call by [facility] team.    Assessment: Upon chart review, [facility] Team member will cancel/close the referral for TCM outreach due to reason below:    RN from the heart clinic completed hospital discharge follow up.    Plan: Care Coordination referral for TCM outreach canceled.    Cristian Goss  [facility]

## 2021-12-17 LAB
ATRIAL RATE - MUSE: 86 BPM
DIASTOLIC BLOOD PRESSURE - MUSE: NORMAL MMHG
INTERPRETATION ECG - MUSE: NORMAL
P AXIS - MUSE: 45 DEGREES
PR INTERVAL - MUSE: 184 MS
QRS DURATION - MUSE: 84 MS
QT - MUSE: 394 MS
QTC - MUSE: 471 MS
R AXIS - MUSE: 50 DEGREES
SYSTOLIC BLOOD PRESSURE - MUSE: NORMAL MMHG
T AXIS - MUSE: 65 DEGREES
VENTRICULAR RATE- MUSE: 86 BPM

## 2021-12-27 ENCOUNTER — HOSPITAL ENCOUNTER (OUTPATIENT)
Dept: CARDIOLOGY | Facility: CLINIC | Age: 51
Discharge: HOME | End: 2021-12-27
Attending: NURSE PRACTITIONER | Admitting: NURSE PRACTITIONER
Payer: COMMERCIAL

## 2021-12-27 DIAGNOSIS — I47.29 PAROXYSMAL VENTRICULAR TACHYCARDIA (H): ICD-10-CM

## 2021-12-27 PROCEDURE — 93244 EXT ECG>48HR<7D REV&INTERPJ: CPT | Performed by: INTERNAL MEDICINE

## 2021-12-27 PROCEDURE — 93242 EXT ECG>48HR<7D RECORDING: CPT

## 2021-12-28 ENCOUNTER — LAB (OUTPATIENT)
Dept: LAB | Facility: CLINIC | Age: 51
End: 2021-12-28
Attending: INTERNAL MEDICINE
Payer: COMMERCIAL

## 2021-12-28 ENCOUNTER — OFFICE VISIT (OUTPATIENT)
Dept: FAMILY MEDICINE | Facility: CLINIC | Age: 51
End: 2021-12-28
Payer: COMMERCIAL

## 2021-12-28 VITALS
TEMPERATURE: 98.5 F | DIASTOLIC BLOOD PRESSURE: 98 MMHG | RESPIRATION RATE: 18 BRPM | OXYGEN SATURATION: 96 % | BODY MASS INDEX: 36.32 KG/M2 | HEART RATE: 103 BPM | SYSTOLIC BLOOD PRESSURE: 144 MMHG | WEIGHT: 238.9 LBS

## 2021-12-28 DIAGNOSIS — I10 ESSENTIAL HYPERTENSION: ICD-10-CM

## 2021-12-28 DIAGNOSIS — Z11.59 ENCOUNTER FOR SCREENING FOR OTHER VIRAL DISEASES: ICD-10-CM

## 2021-12-28 DIAGNOSIS — I47.29 PAROXYSMAL VENTRICULAR TACHYCARDIA (H): Primary | ICD-10-CM

## 2021-12-28 LAB
ANION GAP SERPL CALCULATED.3IONS-SCNC: 5 MMOL/L (ref 3–14)
BUN SERPL-MCNC: 16 MG/DL (ref 7–30)
CALCIUM SERPL-MCNC: 9.7 MG/DL (ref 8.5–10.1)
CHLORIDE BLD-SCNC: 101 MMOL/L (ref 94–109)
CO2 SERPL-SCNC: 32 MMOL/L (ref 20–32)
CREAT SERPL-MCNC: 0.92 MG/DL (ref 0.66–1.25)
GFR SERPL CREATININE-BSD FRML MDRD: >90 ML/MIN/1.73M2
GLUCOSE BLD-MCNC: 118 MG/DL (ref 70–99)
POTASSIUM BLD-SCNC: 3.7 MMOL/L (ref 3.4–5.3)
SODIUM SERPL-SCNC: 138 MMOL/L (ref 133–144)

## 2021-12-28 PROCEDURE — 80048 BASIC METABOLIC PNL TOTAL CA: CPT | Performed by: PHYSICIAN ASSISTANT

## 2021-12-28 PROCEDURE — 99495 TRANSJ CARE MGMT MOD F2F 14D: CPT | Performed by: PHYSICIAN ASSISTANT

## 2021-12-28 PROCEDURE — U0003 INFECTIOUS AGENT DETECTION BY NUCLEIC ACID (DNA OR RNA); SEVERE ACUTE RESPIRATORY SYNDROME CORONAVIRUS 2 (SARS-COV-2) (CORONAVIRUS DISEASE [COVID-19]), AMPLIFIED PROBE TECHNIQUE, MAKING USE OF HIGH THROUGHPUT TECHNOLOGIES AS DESCRIBED BY CMS-2020-01-R: HCPCS

## 2021-12-28 PROCEDURE — U0005 INFEC AGEN DETEC AMPLI PROBE: HCPCS

## 2021-12-28 PROCEDURE — 36415 COLL VENOUS BLD VENIPUNCTURE: CPT | Performed by: PHYSICIAN ASSISTANT

## 2021-12-28 RX ORDER — LISINOPRIL 20 MG/1
20 TABLET ORAL DAILY
Qty: 90 TABLET | Refills: 0 | Status: SHIPPED | OUTPATIENT
Start: 2021-12-28 | End: 2022-01-28

## 2021-12-28 ASSESSMENT — PAIN SCALES - GENERAL: PAINLEVEL: NO PAIN (0)

## 2021-12-28 NOTE — PROGRESS NOTES
Assessment & Plan     Paroxysmal ventricular tachycardia (H)  Doing really well. No further palpitations. He has ziopatch on currently and has follow-up scheduled with cardiology in January.    Essential hypertension  BP still elevated. Plan to increase lisinopril to 20 mg daily and follow-up in 1 month for BP recheck and labs. He will continue to monitor his blood pressure at home.  - lisinopril (ZESTRIL) 20 MG tablet; Take 1 tablet (20 mg) by mouth daily  - Basic metabolic panel  (Ca, Cl, CO2, Creat, Gluc, K, Na, BUN); Future  - Basic metabolic panel  (Ca, Cl, CO2, Creat, Gluc, K, Na, BUN)    Return in about 1 month (around 1/28/2022) for BP Recheck, Lab Work.    Padmini Thompson PA-C  [facility] TWILA Gunderson is a 51 year old who presents for the following health issues    HPI       Hospital Follow-up Visit:    Hospital/Nursing Home/ Rehab Facility: [facility]  Date of Admission: 12/9/2021  Date of Discharge: 12/15/2021  Reason(s) for Admission: Ventricular tachycardia, hypertension, and severe anxiety      Was your hospitalization related to COVID-19? No   Problems taking medications regularly:  None  Medication changes since discharge: Lisinopril 10 mg daily started  Problems adhering to non-medication therapy:  None    Summary of hospitalization:  [facility] discharge summary reviewed  Diagnostic Tests/Treatments reviewed.  Follow up needed: BMP today, follow-up with cardiology scheduled  Other Healthcare Providers Involved in Patient s Care:         Specialist appointment - cardiology scheduled  Update since discharge: improved.  Post Discharge Medication Reconciliation: discharge medications reconciled and changed, per note/orders.  Plan of care communicated with patient        He is doing really well following his hospitalization. He has not had any further palpitations. He is currently wearing a ziopatch and has follow-up  scheduled with cardiology in January.    He was started on lisinopril 10 mg daily in the hospital. Has been monitoring his blood pressure at home and usually in 130s. Here for blood pressure check. No chest pain, shortness of breath, swelling in the extremities, palpitations, dizziness, headaches, vision changes.    He feels like his anxiety is stable and would like to stay on same dose of lexparo.    Review of Systems   Constitutional, HEENT, cardiovascular, pulmonary, gi and gu systems are negative, except as otherwise noted.      Objective    BP (!) 144/98   Pulse 103   Temp 98.5  F (36.9  C) (Oral)   Resp 18   Wt 108.4 kg (238 lb 14.4 oz)   SpO2 96%   BMI 36.32 kg/m    Body mass index is 36.32 kg/m .  Physical Exam   GENERAL: healthy, alert and no distress  RESP: lungs clear to auscultation - no rales, rhonchi or wheezes  CV: regular rate and rhythm, normal S1 S2, no S3 or S4, no murmur, click or rub, no peripheral edema and peripheral pulses strong  NEURO: Normal strength and tone, mentation intact and speech normal  PSYCH: mentation appears normal, affect normal/bright    Admission on 12/09/2021, Discharged on 12/15/2021   Component Date Value Ref Range Status     Ventricular Rate 12/09/2021 106  BPM Final     Atrial Rate 12/09/2021 106  BPM Final     AR Interval 12/09/2021 184  ms Final     QRS Duration 12/09/2021 68  ms Final     QT 12/09/2021 336  ms Final     QTc 12/09/2021 446  ms Final     P Axis 12/09/2021 41  degrees Final     R AXIS 12/09/2021 25  degrees Final     T Axis 12/09/2021 54  degrees Final     Interpretation ECG 12/09/2021    Final                    Value:Sinus tachycardia with occasional Premature ventricular complexes  Possible Left atrial enlargement  Borderline ECG  No previous ECGs available  Confirmed by GENERATED REPORT, COMPUTER ([id number]),  DEIRDRE DAVISON ([id number]) on 12/9/2021 1:50:48 PM       Sodium 12/09/2021 141  133 - 144 mmol/L Final     Potassium 12/09/2021 3.8  3.4 - 5.3  mmol/L Final     Chloride 12/09/2021 107  94 - 109 mmol/L Final     Carbon Dioxide (CO2) 12/09/2021 26  20 - 32 mmol/L Final     Anion Gap 12/09/2021 8  3 - 14 mmol/L Final     Urea Nitrogen 12/09/2021 18  7 - 30 mg/dL Final     Creatinine 12/09/2021 0.86  0.66 - 1.25 mg/dL Final     Calcium 12/09/2021 9.4  8.5 - 10.1 mg/dL Final     Glucose 12/09/2021 124* 70 - 99 mg/dL Final     Alkaline Phosphatase 12/09/2021 63  40 - 150 U/L Final     AST 12/09/2021 32  0 - 45 U/L Final     ALT 12/09/2021 67  0 - 70 U/L Final     Protein Total 12/09/2021 7.8  6.8 - 8.8 g/dL Final     Albumin 12/09/2021 4.0  3.4 - 5.0 g/dL Final     Bilirubin Total 12/09/2021 0.4  0.2 - 1.3 mg/dL Final     GFR Estimate 12/09/2021 >90  >60 mL/min/1.73m2 Final    As of July 11, 2021, eGFR is calculated by the CKD-EPI creatinine equation, without race adjustment. eGFR can be influenced by muscle mass, exercise, and diet. The reported eGFR is an estimation only and is only applicable if the renal function is stable.     Troponin I High Sensitivity 12/09/2021 6  <79 ng/L Final    This Troponin-I result was obtained using a Siemens Dimension Vista High Sensitivity Troponin-I assay (TNIH). Effective 11/23/21, nine labs/sites in the [facility] switched from a Siemens [place] Contemporary Troponin I assay (CTNI) to a Siemens [place] High-Sensitivity Troponin I assay (TNIH).     Magnesium 12/09/2021 2.0  1.6 - 2.3 mg/dL Final     TSH 12/09/2021 2.38  0.40 - 4.00 mU/L Final     SARS CoV2 PCR 12/09/2021 Negative  Negative Final    NEGATIVE: SARS-CoV-2 (COVID-19) RNA not detected, presumed negative.     WBC Count 12/09/2021 10.3  4.0 - 11.0 10e3/uL Final     RBC Count 12/09/2021 5.08  4.40 - 5.90 10e6/uL Final     Hemoglobin 12/09/2021 15.1  13.3 - 17.7 g/dL Final     Hematocrit 12/09/2021 44.9  40.0 - 53.0 % Final     MCV 12/09/2021 88  78 - 100 fL Final     MCH 12/09/2021 29.7  26.5 - 33.0 pg Final     MCHC 12/09/2021 33.6  31.5 - 36.5 g/dL Final     RDW  12/09/2021 12.2  10.0 - 15.0 % Final     Platelet Count 12/09/2021 178  150 - 450 10e3/uL Final     % Neutrophils 12/09/2021 83  % Final     % Lymphocytes 12/09/2021 10  % Final     % Monocytes 12/09/2021 6  % Final     % Eosinophils 12/09/2021 0  % Final     % Basophils 12/09/2021 0  % Final     % Immature Granulocytes 12/09/2021 1  % Final     NRBCs per 100 WBC 12/09/2021 0  <1 /100 Final     Absolute Neutrophils 12/09/2021 8.6* 1.6 - 8.3 10e3/uL Final     Absolute Lymphocytes 12/09/2021 1.1  0.8 - 5.3 10e3/uL Final     Absolute Monocytes 12/09/2021 0.6  0.0 - 1.3 10e3/uL Final     Absolute Eosinophils 12/09/2021 0.0  0.0 - 0.7 10e3/uL Final     Absolute Basophils 12/09/2021 0.0  0.0 - 0.2 10e3/uL Final     Absolute Immature Granulocytes 12/09/2021 0.1  <=0.4 10e3/uL Final     Absolute NRBCs 12/09/2021 0.0  10e3/uL Final     LVEF  12/10/2021 55-60%   Final     Creatinine 12/10/2021 0.93  0.66 - 1.25 mg/dL Final     GFR Estimate 12/10/2021 >90  >60 mL/min/1.73m2 Final    As of July 11, 2021, eGFR is calculated by the CKD-EPI creatinine equation, without race adjustment. eGFR can be influenced by muscle mass, exercise, and diet. The reported eGFR is an estimation only and is only applicable if the renal function is stable.     Sodium 12/12/2021 141  133 - 144 mmol/L Final     Potassium 12/12/2021 4.0  3.4 - 5.3 mmol/L Final     Chloride 12/12/2021 110* 94 - 109 mmol/L Final     Carbon Dioxide (CO2) 12/12/2021 28  20 - 32 mmol/L Final     Anion Gap 12/12/2021 3  3 - 14 mmol/L Final     Urea Nitrogen 12/12/2021 18  7 - 30 mg/dL Final     Creatinine 12/12/2021 0.90  0.66 - 1.25 mg/dL Final     Calcium 12/12/2021 8.6  8.5 - 10.1 mg/dL Final     Glucose 12/12/2021 95  70 - 99 mg/dL Final     GFR Estimate 12/12/2021 >90  >60 mL/min/1.73m2 Final    As of July 11, 2021, eGFR is calculated by the CKD-EPI creatinine equation, without race adjustment. eGFR can be influenced by muscle mass, exercise, and diet. The reported eGFR  is an estimation only and is only applicable if the renal function is stable.     Hemoglobin 12/12/2021 14.6  13.3 - 17.7 g/dL Final     Sodium 12/13/2021 142  133 - 144 mmol/L Final     Potassium 12/13/2021 3.8  3.4 - 5.3 mmol/L Final     Chloride 12/13/2021 110* 94 - 109 mmol/L Final     Carbon Dioxide (CO2) 12/13/2021 27  20 - 32 mmol/L Final     Anion Gap 12/13/2021 5  3 - 14 mmol/L Final     Urea Nitrogen 12/13/2021 15  7 - 30 mg/dL Final     Creatinine 12/13/2021 0.85  0.66 - 1.25 mg/dL Final     Calcium 12/13/2021 9.1  8.5 - 10.1 mg/dL Final     Glucose 12/13/2021 97  70 - 99 mg/dL Final     GFR Estimate 12/13/2021 >90  >60 mL/min/1.73m2 Final    As of July 11, 2021, eGFR is calculated by the CKD-EPI creatinine equation, without race adjustment. eGFR can be influenced by muscle mass, exercise, and diet. The reported eGFR is an estimation only and is only applicable if the renal function is stable.     Ventricular Rate 12/13/2021 82  BPM Final     Atrial Rate 12/13/2021 82  BPM Final     MD Interval 12/13/2021 198  ms Final     QRS Duration 12/13/2021 72  ms Final     QT 12/13/2021 378  ms Final     QTc 12/13/2021 441  ms Final     P Axis 12/13/2021 38  degrees Final     R AXIS 12/13/2021 34  degrees Final     T Axis 12/13/2021 52  degrees Final     Interpretation ECG 12/13/2021    Final                    Value:Sinus rhythm with occasional Premature ventricular complexes  Anteroseptal infarct , age undetermined  Abnormal ECG  When compared with ECG of 09-DEC-2021 12:57,  Anteroseptal infarct is now Present  Confirmed by MD SRINIVAS, VEENA ([id number]),  JESSICA PRADO ([id number]) on 12/15/2021 8:10:32 AM       Ventricular Rate 12/14/2021 86  BPM Final     Atrial Rate 12/14/2021 86  BPM Final     MD Interval 12/14/2021 184  ms Final     QRS Duration 12/14/2021 84  ms Final     QT 12/14/2021 394  ms Final     QTc 12/14/2021 471  ms Final     P Axis 12/14/2021 45  degrees Final     R AXIS 12/14/2021 50  degrees  Final     T Axis 12/14/2021 65  degrees Final     Interpretation ECG 12/14/2021    Final                    Value:Sinus rhythm with frequent Premature ventricular complexes  Nonspecific T wave abnormality  Borderline  Prolonged QT  Abnormal ECG  When compared with ECG of 13-DEC-2021 09:23,  Criteria for Anteroseptal infarct are no longer Present  Confirmed by MD YURIDIA, YAMILEX ([id number]),  LORENA ONEILL ([id number]) on 12/17/2021 11:21:49 AM

## 2021-12-29 LAB — SARS-COV-2 RNA RESP QL NAA+PROBE: NEGATIVE

## 2021-12-30 ENCOUNTER — APPOINTMENT (OUTPATIENT)
Dept: CT IMAGING | Facility: CLINIC | Age: 51
End: 2021-12-30
Attending: EMERGENCY MEDICINE
Payer: COMMERCIAL

## 2021-12-30 ENCOUNTER — HOSPITAL ENCOUNTER (EMERGENCY)
Facility: CLINIC | Age: 51
Discharge: HOME | End: 2021-12-30
Attending: EMERGENCY MEDICINE | Admitting: EMERGENCY MEDICINE
Payer: COMMERCIAL

## 2021-12-30 ENCOUNTER — E-VISIT (OUTPATIENT)
Dept: FAMILY MEDICINE | Facility: CLINIC | Age: 51
End: 2021-12-30
Payer: COMMERCIAL

## 2021-12-30 VITALS
HEART RATE: 93 BPM | DIASTOLIC BLOOD PRESSURE: 94 MMHG | RESPIRATION RATE: 20 BRPM | OXYGEN SATURATION: 90 % | TEMPERATURE: 100.2 F | SYSTOLIC BLOOD PRESSURE: 144 MMHG

## 2021-12-30 DIAGNOSIS — S39.011A FLANK STRAIN, INITIAL ENCOUNTER: ICD-10-CM

## 2021-12-30 DIAGNOSIS — J18.9 COMMUNITY ACQUIRED PNEUMONIA OF LEFT LOWER LOBE OF LUNG: ICD-10-CM

## 2021-12-30 DIAGNOSIS — Z11.59 ENCOUNTER FOR SCREENING FOR OTHER VIRAL DISEASES: ICD-10-CM

## 2021-12-30 DIAGNOSIS — S39.011A STRAIN OF ABDOMINAL WALL, INITIAL ENCOUNTER: ICD-10-CM

## 2021-12-30 DIAGNOSIS — M54.9 BACK PAIN, UNSPECIFIED BACK LOCATION, UNSPECIFIED BACK PAIN LATERALITY, UNSPECIFIED CHRONICITY: Primary | ICD-10-CM

## 2021-12-30 LAB
ALBUMIN SERPL-MCNC: 3.7 G/DL (ref 3.4–5)
ALP SERPL-CCNC: 62 U/L (ref 40–150)
ALT SERPL W P-5'-P-CCNC: 34 U/L (ref 0–70)
ANION GAP SERPL CALCULATED.3IONS-SCNC: 6 MMOL/L (ref 3–14)
AST SERPL W P-5'-P-CCNC: 13 U/L (ref 0–45)
BASOPHILS # BLD AUTO: 0 10E3/UL (ref 0–0.2)
BASOPHILS NFR BLD AUTO: 0 %
BILIRUB SERPL-MCNC: 1 MG/DL (ref 0.2–1.3)
BUN SERPL-MCNC: 13 MG/DL (ref 7–30)
CALCIUM SERPL-MCNC: 9.6 MG/DL (ref 8.5–10.1)
CHLORIDE BLD-SCNC: 103 MMOL/L (ref 94–109)
CO2 SERPL-SCNC: 28 MMOL/L (ref 20–32)
CREAT SERPL-MCNC: 0.82 MG/DL (ref 0.66–1.25)
EOSINOPHIL # BLD AUTO: 0 10E3/UL (ref 0–0.7)
EOSINOPHIL NFR BLD AUTO: 0 %
ERYTHROCYTE [DISTWIDTH] IN BLOOD BY AUTOMATED COUNT: 12.4 % (ref 10–15)
FLUAV RNA SPEC QL NAA+PROBE: NEGATIVE
FLUBV RNA RESP QL NAA+PROBE: NEGATIVE
GFR SERPL CREATININE-BSD FRML MDRD: >90 ML/MIN/1.73M2
GLUCOSE BLD-MCNC: 127 MG/DL (ref 70–99)
HCT VFR BLD AUTO: 46.3 % (ref 40–53)
HGB BLD-MCNC: 14.9 G/DL (ref 13.3–17.7)
HOLD SPECIMEN: NORMAL
HOLD SPECIMEN: NORMAL
IMM GRANULOCYTES # BLD: 0.1 10E3/UL
IMM GRANULOCYTES NFR BLD: 1 %
LACTATE SERPL-SCNC: 1.9 MMOL/L (ref 0.7–2)
LIPASE SERPL-CCNC: 90 U/L (ref 73–393)
LYMPHOCYTES # BLD AUTO: 1.2 10E3/UL (ref 0.8–5.3)
LYMPHOCYTES NFR BLD AUTO: 8 %
MCH RBC QN AUTO: 30.1 PG (ref 26.5–33)
MCHC RBC AUTO-ENTMCNC: 32.2 G/DL (ref 31.5–36.5)
MCV RBC AUTO: 94 FL (ref 78–100)
MONOCYTES # BLD AUTO: 1.2 10E3/UL (ref 0–1.3)
MONOCYTES NFR BLD AUTO: 8 %
NEUTROPHILS # BLD AUTO: 13.2 10E3/UL (ref 1.6–8.3)
NEUTROPHILS NFR BLD AUTO: 83 %
NRBC # BLD AUTO: 0 10E3/UL
NRBC BLD AUTO-RTO: 0 /100
PLATELET # BLD AUTO: 185 10E3/UL (ref 150–450)
POTASSIUM BLD-SCNC: 3.9 MMOL/L (ref 3.4–5.3)
PROT SERPL-MCNC: 8.7 G/DL (ref 6.8–8.8)
RBC # BLD AUTO: 4.95 10E6/UL (ref 4.4–5.9)
SARS-COV-2 RNA RESP QL NAA+PROBE: NEGATIVE
SODIUM SERPL-SCNC: 137 MMOL/L (ref 133–144)
WBC # BLD AUTO: 15.7 10E3/UL (ref 4–11)

## 2021-12-30 PROCEDURE — 85025 COMPLETE CBC W/AUTO DIFF WBC: CPT | Performed by: EMERGENCY MEDICINE

## 2021-12-30 PROCEDURE — 99285 EMERGENCY DEPT VISIT HI MDM: CPT | Mod: 25

## 2021-12-30 PROCEDURE — 83605 ASSAY OF LACTIC ACID: CPT | Performed by: EMERGENCY MEDICINE

## 2021-12-30 PROCEDURE — 258N000003 HC RX IP 258 OP 636: Performed by: EMERGENCY MEDICINE

## 2021-12-30 PROCEDURE — 87636 SARSCOV2 & INF A&B AMP PRB: CPT | Performed by: EMERGENCY MEDICINE

## 2021-12-30 PROCEDURE — 83690 ASSAY OF LIPASE: CPT | Performed by: EMERGENCY MEDICINE

## 2021-12-30 PROCEDURE — 250N000011 HC RX IP 250 OP 636: Performed by: EMERGENCY MEDICINE

## 2021-12-30 PROCEDURE — 99207 PR NON-BILLABLE SERV PER CHARTING: CPT | Performed by: PHYSICIAN ASSISTANT

## 2021-12-30 PROCEDURE — C9803 HOPD COVID-19 SPEC COLLECT: HCPCS

## 2021-12-30 PROCEDURE — 36415 COLL VENOUS BLD VENIPUNCTURE: CPT | Performed by: EMERGENCY MEDICINE

## 2021-12-30 PROCEDURE — 80053 COMPREHEN METABOLIC PANEL: CPT | Performed by: EMERGENCY MEDICINE

## 2021-12-30 PROCEDURE — 96365 THER/PROPH/DIAG IV INF INIT: CPT | Mod: 59

## 2021-12-30 PROCEDURE — 250N000013 HC RX MED GY IP 250 OP 250 PS 637: Performed by: EMERGENCY MEDICINE

## 2021-12-30 PROCEDURE — 96375 TX/PRO/DX INJ NEW DRUG ADDON: CPT

## 2021-12-30 PROCEDURE — 96361 HYDRATE IV INFUSION ADD-ON: CPT

## 2021-12-30 PROCEDURE — 74177 CT ABD & PELVIS W/CONTRAST: CPT

## 2021-12-30 RX ORDER — METHOCARBAMOL 750 MG/1
750-1500 TABLET, FILM COATED ORAL 3 TIMES DAILY PRN
Qty: 30 TABLET | Refills: 0 | Status: SHIPPED | OUTPATIENT
Start: 2021-12-30 | End: 2022-01-04

## 2021-12-30 RX ORDER — METHOCARBAMOL 500 MG/1
1000 TABLET, FILM COATED ORAL ONCE
Status: COMPLETED | OUTPATIENT
Start: 2021-12-30 | End: 2021-12-30

## 2021-12-30 RX ORDER — IOPAMIDOL 755 MG/ML
500 INJECTION, SOLUTION INTRAVASCULAR ONCE
Status: COMPLETED | OUTPATIENT
Start: 2021-12-30 | End: 2021-12-30

## 2021-12-30 RX ORDER — ACETAMINOPHEN 325 MG/1
650 TABLET ORAL ONCE
Status: COMPLETED | OUTPATIENT
Start: 2021-12-30 | End: 2021-12-30

## 2021-12-30 RX ORDER — SENNA AND DOCUSATE SODIUM 50; 8.6 MG/1; MG/1
1-2 TABLET, FILM COATED ORAL 2 TIMES DAILY PRN
Qty: 30 TABLET | Refills: 0 | Status: SHIPPED | OUTPATIENT
Start: 2021-12-30 | End: 2022-01-28

## 2021-12-30 RX ORDER — CEFPODOXIME PROXETIL 200 MG/1
200 TABLET, FILM COATED ORAL 2 TIMES DAILY
Qty: 28 TABLET | Refills: 0 | Status: SHIPPED | OUTPATIENT
Start: 2021-12-30 | End: 2022-01-13

## 2021-12-30 RX ORDER — DOXYCYCLINE 100 MG/1
100 CAPSULE ORAL ONCE
Status: COMPLETED | OUTPATIENT
Start: 2021-12-30 | End: 2021-12-30

## 2021-12-30 RX ORDER — DOXYCYCLINE 100 MG/1
100 CAPSULE ORAL 2 TIMES DAILY
Qty: 14 CAPSULE | Refills: 0 | Status: SHIPPED | OUTPATIENT
Start: 2021-12-30 | End: 2022-01-06

## 2021-12-30 RX ORDER — CEFTRIAXONE 2 G/1
2 INJECTION, POWDER, FOR SOLUTION INTRAMUSCULAR; INTRAVENOUS ONCE
Status: COMPLETED | OUTPATIENT
Start: 2021-12-30 | End: 2021-12-30

## 2021-12-30 RX ORDER — OXYCODONE HYDROCHLORIDE 5 MG/1
5 TABLET ORAL EVERY 6 HOURS PRN
Qty: 12 TABLET | Refills: 0 | Status: SHIPPED | OUTPATIENT
Start: 2021-12-30 | End: 2022-01-21

## 2021-12-30 RX ORDER — MORPHINE SULFATE 4 MG/ML
4 INJECTION, SOLUTION INTRAMUSCULAR; INTRAVENOUS ONCE
Status: COMPLETED | OUTPATIENT
Start: 2021-12-30 | End: 2021-12-30

## 2021-12-30 RX ADMIN — METHOCARBAMOL 1000 MG: 500 TABLET ORAL at 13:14

## 2021-12-30 RX ADMIN — SODIUM CHLORIDE 1000 ML: 9 INJECTION, SOLUTION INTRAVENOUS at 13:14

## 2021-12-30 RX ADMIN — ACETAMINOPHEN 650 MG: 325 TABLET, FILM COATED ORAL at 13:14

## 2021-12-30 RX ADMIN — IOPAMIDOL 100 ML: 755 INJECTION, SOLUTION INTRAVENOUS at 14:48

## 2021-12-30 RX ADMIN — DOXYCYCLINE HYCLATE 100 MG: 100 CAPSULE ORAL at 15:50

## 2021-12-30 RX ADMIN — SODIUM CHLORIDE 82 ML: 9 INJECTION, SOLUTION INTRAVENOUS at 14:48

## 2021-12-30 RX ADMIN — CEFTRIAXONE 2 G: 2 INJECTION, POWDER, FOR SOLUTION INTRAMUSCULAR; INTRAVENOUS at 15:50

## 2021-12-30 RX ADMIN — MORPHINE SULFATE 4 MG: 4 INJECTION INTRAVENOUS at 13:14

## 2021-12-30 ASSESSMENT — ENCOUNTER SYMPTOMS
ABDOMINAL PAIN: 1
NAUSEA: 0
DYSURIA: 0
WEAKNESS: 0
SHORTNESS OF BREATH: 0
BACK PAIN: 0
VOMITING: 0
DIARRHEA: 0

## 2021-12-30 NOTE — ED TRIAGE NOTES
Patient presents with left flank pain that has radiated into left abdomen. Snow blowing 2 days ago and slipped and twisted back wrong. Back pain has improved since then. Yesterday he started noticing the pain radiating around into abdomen. Unable to get comfortable sitting or laying down. Diaphoretic in triage. Denies any urinary symptoms. Fevers on and off the last 2-3 days. Recent COVID test on Monday for upcoming colonoscopy, negative result. Took ibuprofen around 0900 today.

## 2021-12-30 NOTE — ED PROVIDER NOTES
"  History   Chief Complaint:  Abdominal Pain       The history is provided by the patient.      Neptali Ospina is a 51 year old male with history of anxiety, obesity, nonalcoholic steatohepatitis, thyroid nodule who presents with abdominal pain. The patient reports that he began experiencing abdominal pain about 2 days ago. He notes that he slipped while snow-blowing his driveway earlier that day, catching himself and twisting his body severely, although the onset of his pain was reportedly hours later. His pain is localized to his left upper quadrant, \"just below [his] ribcage.\" It is worsened when he stands up or lays down flat. While standing, he notes that the severity of his pain is either a 6 or 7/10.  He has been taking ibuprofen and applying ice to the region, which reportedly helps. Of note, the patient reports that he was hospitalized about 2 weeks ago for a cardiac ablation. He states that he is not on blood thinners. He is not a smoker. He endorses no alcohol or drug use. The patient denies nausea, vomiting, diarrhea, or dysuria. He is reportedly not experiencing shortness of breath. He also reports no leg weakness or back pain.     Review of Systems   Respiratory: Negative for shortness of breath.    Gastrointestinal: Positive for abdominal pain (LUQ). Negative for diarrhea, nausea and vomiting.   Genitourinary: Negative for dysuria.   Musculoskeletal: Negative for back pain.   Neurological: Negative for weakness (legs).   All other systems reviewed and are negative.      Allergies:  No Known Drug Allergies    Medications:  Lexapro  Flonase  Lisinopril  Simvastatin  Magnesium oxide    Past Medical History:     Anxiety  HSV-1 infection  Hyperlipidemia  Nonalcoholic steatohepatitis  Obesity  DANIELLE  Paroxysmal ventricular tachycardia  Thyroid nodule  Vitamin D deficiency  White coat syndrome      Past Surgical History:    Heart catheterization  Cardiac ablation  PE tube placement  Tonsillectomy and " adenoidectomy  Vasectomy     Family History:    Mother: CAD, brain cancer  Father: Sleep apnea, CAD, COPD, hyperlipidemia, hypertension, snoring  Sister: Bile duct cancer, gynecological issue    Social History:  Presents to the emergency department with his daughter  Daughter drove him to the ED  Patient is     Physical Exam     Patient Vitals for the past 24 hrs:   BP Temp Temp src Pulse Resp SpO2   12/30/21 1457 -- -- -- -- -- 90 %   12/30/21 1456 (!) 160/103 -- -- 109 20 --   12/30/21 1415 139/84 -- -- 90 -- 91 %   12/30/21 1400 (!) 143/86 -- -- 95 -- 90 %   12/30/21 1345 (!) 143/91 -- -- 98 -- 90 %   12/30/21 1330 (!) 151/98 -- -- -- -- 93 %   12/30/21 1315 -- -- -- 111 -- 94 %   12/30/21 1213 (!) 182/118 100.2  F (37.9  C) Oral (!) 122 24 94 %       Physical Exam  Constitutional: Well developed, nontox appearance  Head: Atraumatic.   Neck:  no stridor  Eyes: no scleral icterus  Cardiovascular: Regular tachycardia, 2+ bilat radial pulses  Pulmonary/Chest: nml resp effort, equal BS bilat  Abdominal: ND, soft, mild left upper quadrant tenderness, no rebound or guarding   Back: No T or L-spine tenderness  : no CVA tenderness bilat  Ext: Warm, well perfused, no edema  Neurological: A&O, symmetric facies, moves ext x4, steady gait  Skin: Skin is warm and diaphoretic  Psychiatric: Behavior is normal. Thought content normal.   Nursing note and vitals reviewed.    Emergency Department Course     Imaging:  CT Abdomen Pelvis w Contrast   Preliminary Result   IMPRESSION:    1.  Dense consolidation at the left lung base worrisome for pneumonia.   Small left pleural effusion.   2.  No other acute abnormality.   3.  Fatty liver.        Report per radiology    Laboratory:  Labs Ordered and Resulted from Time of ED Arrival to Time of ED Departure   COMPREHENSIVE METABOLIC PANEL - Abnormal       Result Value    Sodium 137      Potassium 3.9      Chloride 103      Carbon Dioxide (CO2) 28      Anion Gap 6      Urea  Nitrogen 13      Creatinine 0.82      Calcium 9.6      Glucose 127 (*)     Alkaline Phosphatase 62      AST 13      ALT 34      Protein Total 8.7      Albumin 3.7      Bilirubin Total 1.0      GFR Estimate >90     CBC WITH PLATELETS AND DIFFERENTIAL - Abnormal    WBC Count 15.7 (*)     RBC Count 4.95      Hemoglobin 14.9      Hematocrit 46.3      MCV 94      MCH 30.1      MCHC 32.2      RDW 12.4      Platelet Count 185      % Neutrophils 83      % Lymphocytes 8      % Monocytes 8      % Eosinophils 0      % Basophils 0      % Immature Granulocytes 1      NRBCs per 100 WBC 0      Absolute Neutrophils 13.2 (*)     Absolute Lymphocytes 1.2      Absolute Monocytes 1.2      Absolute Eosinophils 0.0      Absolute Basophils 0.0      Absolute Immature Granulocytes 0.1      Absolute NRBCs 0.0     LIPASE - Normal    Lipase 90     LACTIC ACID WHOLE BLOOD - Normal    Lactic Acid 1.9     INFLUENZA A/B & SARS-COV2 PCR MULTIPLEX - Normal    Influenza A PCR Negative      Influenza B PCR Negative      SARS CoV2 PCR Negative          Emergency Department Course:  Reviewed:  I reviewed nursing notes, vitals, past medical history and Care Everywhere    Assessments:  1302 I obtained history and examined the patient as noted above.   1525 I rechecked the patient and explained findings.    Interventions:  1314 Tylenol 650 mg PO  1314 NS bolus 1L IV  1314 Morphine 4 mg IV  1314 Robaxin 1000 mg PO   1448 NS bolus 82 mL IV     Disposition:  The patient was discharged to home.     Impression & Plan     CMS Diagnoses: None    Medical Decision Makin year old male presenting w/ left flank/LUQ pain and tenderness status post near fall      DDx includes abdominal wall muscle strain, oblique strain, splenic injury although less likely given no direct trauma.  Doubt rib fracture given no reproducible tenderness on exam.  Doubt PE, ACS, dissection given pain is exacerbated by movement.  Labs significant for leukocytosis.  Imaging sig for  incidental finding of left lower lobe pneumonia.  Interventions as noted above with improvement in symptoms.  Persistent antibiotics given in the emergency department.  Given the patient's leukocytosis, left lower lobe pneumonia is likely community-acquired pneumonia.  And status from her also given.  Prescriptions written as noted below for outpatient symptom control and treatment of CAP.  At this time I feel the pt is safe for discharge.  Recommendations given regarding follow up with PCP and return to the emergency department as needed for new or worsening symptoms.  Pt counseled on all results, disposition and diagnosis.  They are understanding and agreeable to plan. Patient discharged in stable condition.         Diagnosis:    ICD-10-CM    1. Community acquired pneumonia of left lower lobe of lung  J18.9    2. Flank strain, initial encounter  S39.011A    3. Strain of abdominal wall, initial encounter  S39.011A        Discharge Medications:  New Prescriptions    CEFPODOXIME (VANTIN) 200 MG TABLET    Take 1 tablet (200 mg) by mouth 2 times daily for 14 days    DOXYCYCLINE HYCLATE (VIBRAMYCIN) 100 MG CAPSULE    Take 1 capsule (100 mg) by mouth 2 times daily for 7 days    OXYCODONE (ROXICODONE) 5 MG TABLET    Take 1 tablet (5 mg) by mouth every 6 hours as needed for pain    SENNA-DOCUSATE SODIUM (SENNA S) 8.6-50 MG TABLET    Take 1-2 tablets by mouth 2 times daily as needed (if taking oxycodone)       Scribe Disclosure:  I, Andrew Jauregui, am serving as a scribe at 1:01 PM on 12/30/2021 to document services personally performed by Liu Duncan MD based on my observations and the provider's statements to me.              Liu Duncan MD  12/30/21 1542       Liu Duncan MD  12/30/21 3748

## 2021-12-30 NOTE — DISCHARGE INSTRUCTIONS
-Take acetaminophen 500 to 1000 mg by mouth every 4 to 6 hours as needed for pain or fever.  Do not take more than 4000 mg in 24 hours.  Do not take within 6 hours of another acetaminophen containing medication such as norco (vicodin) or percocet.  - Take ibuprofen 600 to 800 mg by mouth every 6 to 8 hours as needed for pain or fever    Please use incentive spirometer as instructed.    Please return to the emergency department as needed for new or worsening symptoms including worsening difficulty breathing, fainting, vomiting unable to keep anything down, severe confusion, any other concerning symptoms.    Discharge Instructions  Chest Injury    You have been seen today because of a chest injury.  You may have contusion (bruise) of the chest or a rib fracture (broken bone).  Rib fractures can be hard to see on x-ray, so we cannot always be sure whether your rib is broken or bruised. Fortunately, the treatment of these injuries is usually the same, and includes pain control and preventing complications.    Generally, every Emergency Department visit should have a follow-up clinic visit with either a primary or a specialty clinic/provider. Please follow-up as instructed by your emergency provider today.    Return to the Emergency Department if:  You become short of breath.  You develop a fever over 101.5 F.  You pass out or become very weak or pale.  You have abdominal (belly) pain that is new or increasing.  You cough up blood.  You have new symptoms or anything that worries you.    Follow-up with your provider:  As directed by your provider today.  If you are not improved in two weeks.  If you need more pain medicine, since we do not refill pain pills through the Emergency Department.    Home care instructions:  Chest injuries can be painful.  You may take an over-the-counter pain medication such as Tylenol  (acetaminophen), Advil  (ibuprofen), Motrin  (ibuprofen) or Aleve  (naproxen).  Applying ice packs to the  painful area can help your pain.   Holding a pillow against your chest can help with pain when you need to move or cough.  You may need to rest and avoid lifting particularly in the first few days after your injury.  Prevention of pneumonia (lung infection) is also a part of managing chest injuries.  Because it can hurt to take deep breaths, you could develop collapsed areas of lung that can develop infection.  To prevent this, you need to take ten very deep breaths every hour while you are awake. Sometimes you will be given a device called an incentive spirometer to help with this. You also need to make yourself cough every hour.  Rib belts or binders are not generally recommended, since they may increase the risk of pneumonia. If you do use one, use it for only short periods of time.   If you were given a prescription for medicine here today, be sure to read all of the information (including the package insert) that comes with your prescription.  This will include important information about the medicine, its side effects, and any warnings that you need to know about.  The pharmacist who fills the prescription can provide more information and answer questions you may have about the medicine.  If you have questions or concerns that the pharmacist cannot address, please call or return to the Emergency Department.   Remember that you can always come back to the Emergency Department if you are not able to see your regular provider in the amount of time listed above, if you get any new symptoms, or if there is anything that worries you.    Yes

## 2022-01-06 ASSESSMENT — SLEEP AND FATIGUE QUESTIONNAIRES
HOW LIKELY ARE YOU TO NOD OFF OR FALL ASLEEP IN A CAR, WHILE STOPPED FOR A FEW MINUTES IN TRAFFIC: WOULD NEVER DOZE
HOW LIKELY ARE YOU TO NOD OFF OR FALL ASLEEP WHILE SITTING AND READING: SLIGHT CHANCE OF DOZING
HOW LIKELY ARE YOU TO NOD OFF OR FALL ASLEEP WHILE SITTING INACTIVE IN A PUBLIC PLACE: SLIGHT CHANCE OF DOZING
HOW LIKELY ARE YOU TO NOD OFF OR FALL ASLEEP WHILE WATCHING TV: SLIGHT CHANCE OF DOZING
HOW LIKELY ARE YOU TO NOD OFF OR FALL ASLEEP WHILE SITTING AND TALKING TO SOMEONE: WOULD NEVER DOZE
HOW LIKELY ARE YOU TO NOD OFF OR FALL ASLEEP WHEN YOU ARE A PASSENGER IN A CAR FOR AN HOUR WITHOUT A BREAK: SLIGHT CHANCE OF DOZING
HOW LIKELY ARE YOU TO NOD OFF OR FALL ASLEEP WHILE LYING DOWN TO REST IN THE AFTERNOON WHEN CIRCUMSTANCES PERMIT: SLIGHT CHANCE OF DOZING
HOW LIKELY ARE YOU TO NOD OFF OR FALL ASLEEP WHILE SITTING QUIETLY AFTER LUNCH WITHOUT ALCOHOL: SLIGHT CHANCE OF DOZING

## 2022-01-10 ENCOUNTER — VIRTUAL VISIT (OUTPATIENT)
Dept: SLEEP MEDICINE | Facility: CLINIC | Age: 52
End: 2022-01-10
Payer: COMMERCIAL

## 2022-01-10 VITALS — WEIGHT: 223 LBS | HEIGHT: 68 IN | BODY MASS INDEX: 33.8 KG/M2

## 2022-01-10 DIAGNOSIS — E66.9 OBESITY (BMI 30-39.9): ICD-10-CM

## 2022-01-10 DIAGNOSIS — G47.33 OBSTRUCTIVE SLEEP APNEA: Primary | ICD-10-CM

## 2022-01-10 PROCEDURE — 99203 OFFICE O/P NEW LOW 30 MIN: CPT | Mod: 95 | Performed by: INTERNAL MEDICINE

## 2022-01-10 ASSESSMENT — MIFFLIN-ST. JEOR: SCORE: 1846.02

## 2022-01-10 NOTE — PROGRESS NOTES
Terri is a 51 year old who is being evaluated via a billable video visit.      How would you like to obtain your AVS? MyChart  If the video visit is dropped, the invitation should be resent by: Send to e-mail at: [email or web address]  Will anyone else be joining your video visit? No    Video Start Time: 8:29 AM  Video-Visit Details    Type of service:  Video Visit    Video End Time:8:40 AM    Originating Location (pt. Location): Home    Distant Location (provider location):  [facility]     Platform used for Video Visit: [employer]     Answers for HPI/ROS submitted by the patient on 1/6/2022  General Symptoms: No  Skin Symptoms: No  HENT Symptoms: No  EYE SYMPTOMS: No  HEART SYMPTOMS: No  LUNG SYMPTOMS: No  INTESTINAL SYMPTOMS: No  URINARY SYMPTOMS: No  REPRODUCTIVE SYMPTOMS: No  SKELETAL SYMPTOMS: No  BLOOD SYMPTOMS: No  NERVOUS SYSTEM SYMPTOMS: No  MENTAL HEALTH SYMPTOMS: No    Additional 15 minutes on the date of service was spent performing the following:    -Preparing to see the patient  -Obtaining and/or reviewing separately obtained history   -Ordering medications, tests, or procedures   -Documenting clinical information in the electronic or other health record     Thank you for the opportunity to participate in the care of  Terri Ospina.    Assessment and Plan:    1. Obstructive sleep apnea  I will write a prescription for the patient to try to obtain supplies from [employer]  - COMPREHENSIVE DME    2. Obesity (BMI 30-39.9)  Discussed with patient strategies on active weight management.    History of present illness:    He is a 51 year old male who comes to the virtual clinic for the transfer of care of his obstructive sleep apnea. The patient was diagnosed with DANIELLE on 04/14/03 (AHI=65.5). The patient would like to establish care to get supplies from [facility]. He continues to benefit from CPAP therapy. He other wise has no other complaints.      Ideal Sleep-Wake Cycle(devoid of societal pressure):    Patient would try to initiate sleep at around 10:30-11 PM with a sleep latency of 10-15 minutes. The patient would have zero awakenings. Final wake up time is around 7:30-8 AM.    Compliance Download data for 30 Days:  Pressure setting: APAP 7-20 cwp  95% pressure:14.1 cwp  Residual AHI:2.1 events per hour  Leak:Minimal  Compliance:50%  Mask Tolerance:Good  Skin irritation:None  DME:[facility]    YNES:  YNES Total Score: 0  Total score - [place]: 6 (1/6/2022  8:36 PM)    Patient Active Problem List   Diagnosis     Obesity     DANIELLE on CPAP     Vitamin D deficiency     Left thyroid nodule     Anxiety     White coat syndrome without diagnosis of hypertension     CARDIOVASCULAR SCREENING; LDL GOAL LESS THAN 160     Morbid obesity (H)     Paroxysmal ventricular tachycardia (H)     Past Medical History:   Diagnosis Date     High triglycerides      DARLING (nonalcoholic steatohepatitis)     12/10      DANIELLE on CPAP      Palpitations      Paroxysmal ventricular tachycardia (H)      Strabismus      Thyroid nodule 02/2012    biopsy thyroglossal ductal cyst.  see Endo     Vitamin D deficiency      Past Surgical History:   Procedure Laterality Date     CV HEART CATHETERIZATION WITH POSSIBLE INTERVENTION N/A 12/10/2021    Procedure: Heart Catheterization with Possible Intervention;  Surgeon: Quyen Lomas MD;  Location:  HEART CARDIAC CATH LAB     EP ABLATION VT/PVC N/A 12/14/2021    Procedure: EP Ablation VT;  Surgeon: Zhen Saavedra MD;  Location:  HEART CARDIAC CATH LAB     PE TUBES       TONSILLECTOMY & ADENOIDECTOMY       VASECTOMY       Current Outpatient Medications   Medication Sig Dispense Refill     cefpodoxime (VANTIN) 200 MG tablet Take 1 tablet (200 mg) by mouth 2 times daily for 14 days 28 tablet 0     escitalopram (LEXAPRO) 10 MG tablet Take 1 tablet (10 mg) by mouth daily 90 tablet 3     fish oil-omega-3 fatty acids (OMEGA 3) 1000 MG capsule  Take 1 capsule by mouth 2 times daily. Lavazo fish oil 180 capsule 1     fluticasone (FLONASE) 50 MCG/ACT nasal spray Spray 1-2 sprays into both nostrils daily (Patient taking differently: Spray 1-2 sprays into both nostrils daily as needed ) 1 Package 1     lisinopril (ZESTRIL) 20 MG tablet Take 1 tablet (20 mg) by mouth daily 90 tablet 0     magnesium oxide 200 MG TABS Take 200 mg by mouth daily       Multiple Vitamin (MULTI-VITAMIN) per tablet Take 1 tablet by mouth daily. 90 tablet 3     oxyCODONE (ROXICODONE) 5 MG tablet Take 1 tablet (5 mg) by mouth every 6 hours as needed for pain 12 tablet 0     SENNA-docusate sodium (SENNA S) 8.6-50 MG tablet Take 1-2 tablets by mouth 2 times daily as needed (if taking oxycodone) 30 tablet 0     simvastatin (ZOCOR) 20 MG tablet TAKE ONE TABLET BY MOUTH EVERY NIGHT AT BEDTIME 90 tablet 3     Nkda [no known drug allergies]  Social History     Socioeconomic History     Marital status:      Spouse name: Not on file     Number of children: 4     Years of education: Not on file     Highest education level: Not on file   Occupational History     Not on file   Tobacco Use     Smoking status: Former Smoker     Smokeless tobacco: Never Used   Vaping Use     Vaping Use: Never used   Substance and Sexual Activity     Alcohol use: Yes     Alcohol/week: 0.0 standard drinks     Comment: very occ.     Drug use: No     Sexual activity: Yes     Partners: Female     Birth control/protection: Surgical   Other Topics Concern     Parent/sibling w/ CABG, MI or angioplasty before 65F 55M? Yes   Social History Narrative     Not on file     Social Determinants of Health     Financial Resource Strain: Not on file   Food Insecurity: Not on file   Transportation Needs: Not on file   Physical Activity: Not on file   Stress: Not on file   Social Connections: Not on file   Intimate Partner Violence: Not on file   Housing Stability: Not on file     Family History   Problem Relation Age of Onset      CONSUELOABRANDT Mother      Cancer Mother         brain tumor     Circulatory Father         sleep apnea,   in sleep 58     Gynecology Sister         x2        Physical Exam:  GEN: NAD,   Head: Normocephalic.  EYES: EOMI  ENT: Oropharynx is clear, Wolff class 4+ airway.   Psych: normal mood, normal affect     Labs/Studies:     No results found for: PH, PHARTERIAL, PO2, [id number], SAT, PCO2, HCO3, BASEEXCESS, KELLEY, BEB  Lab Results   Component Value Date    TSH 2.38 2021    TSH 2.49 2021     Lab Results   Component Value Date     (H) 2021     (H) 2021     Lab Results   Component Value Date    HGB 14.9 2021    HGB 14.6 2021     Lab Results   Component Value Date    BUN 13 2021    BUN 16 2021    CR 0.82 2021    CR 0.92 2021     Lab Results   Component Value Date    AST 13 2021    AST 32 2021    ALT 34 2021    ALT 67 2021    ALKPHOS 62 2021    ALKPHOS 63 2021    BILITOTAL 1.0 2021    BILITOTAL 0.4 2021     No results found for: UAMP, UBARB, BENZODIAZEUR, UCANN, UCOC, OPIT, UPCP    Recent Labs   Lab Test 21  1304 21  1320    138   POTASSIUM 3.9 3.7   CHLORIDE 103 101   CO2 28 32   ANIONGAP 6 5   * 118*   BUN 13 16   CR 0.82 0.92   TD 9.6 9.7       No results found for: THOMAS Jiang DO  Board Certified in Internal Medicine and Sleep Medicine    (Note created with Dragon voice recognition and unintended spelling errors and word substitutions may occur)

## 2022-01-20 NOTE — PROGRESS NOTES
Cardiology Clinic Progress Note    Service Date: 01/21/22    Primary Cardiologist: Dr. Hutton      Reason for Visit: Hospital follow-up    HPI:   I had the pleasure of seeing . Neptali Ospina in the clinic today and he is a very pleasant 51 year old male with a past medical history notable for    1. Sustained monomorphic VT.  S/p VT ablation at [facility] (12/2021)  2. Anxiety  3. DARLING  4. Obesity  5. DANIELLE.  Uses CPAP.   6. Mild aortic root dilation    Diagnostics  I have personally reviewed the following reports    ECHO (12/2021)  Left ventricular systolic function is normal. The visual ejection fraction is 55-60%.  No regional wall motion abnormalities noted. The right ventricle is normal in structure, function and size. No evidence for significant valvular pathology. Mildly dilated aortic root (sinus of valsalva) 4.1 cm and mildly dilated ascending aorta, 3.8 cm.    Coronary catheretization (12/2021) showed normal coronary arteries.     MRI  (12/2021) Normal LV and RV systolic functions. LVEF 61%.  Late gadolinium enhancement imaging shows small size mid inferior wall subepicardial to mid myocardial delayed enhancement. No evidence of myocardial edema on T2 weighted images. Together these findings may represent prior myocarditis.    Zio patch (12/2021) revealed sinus rhythm with an average heart rate of 91 bpm, rare PACs and PVCs.  No triggered events    In December 2021, patient was urgently seen by Dr. Hutton due to sustained fast runs of ventricular tachycardia.  Patient triggered multiple times for ventricular tachycardia with duration 10-15 beats to over 3 minutes with an average heart rate of 250 bpm.  He was asked to proceed to the emergency room.   He underwent an echo which showed a normal EF, he underwent a coronary catheterization which showed normal coronary arteries, his MRI showed small size mid inferior wall subepicardial and mid myocardial delayed enhancement.    MRI findings was incidental and no  interventions were needed.    On 12/28/2021 he saw his primary his lisinopril was increased due to hypertension and a few days would later was in the emergency room with left upper quadrant pain after a fall however he was found to have community-acquired pneumonia      Today, he reports feeling good and denies palpitations,  chest pain, shortness of breath, dyspnea on exertion, orthopnea, PND, lower extremity edema, dizziness, presyncope, or syncope.   He is exercising without problems    ASSESSMENT AND PLAN:    Sustained monomorphic ventricular tachycardia    Noted to have episodes on Zio patch from 10-15 beats 2-3 minutes with an average heart rate of 250 bpm    S/p  RVOT ablation (12/2021) with repeat monitor showing normal sinus rhythm    Repeat zio patch shows no arrhythmias    Today's ECG shows sinus tachycardia at 115 bpm (pt is very anxious)    He denies palpitations     Encouraged 12-16 oz of coffee daily, no more than 2 servings of alcohol daily, exercise regularly, keep hydrated.      Hypertension    Slightly elevated in clinic but controlled at home while taking currently taking lisinopril.  He has white coat syndrome and we discussed treating his anxiety.       Plan:    Return to cardiology as needed    Control BP, weight, cholesterol and anxiety.  Continue to exercise    Please call the clinic if questions or problems arise      Thank you for the opportunity to participate in this pleasant patient's care. We would be happy to see him sooner if needed for any concerns in the meantime.        RONALD Ivey [facility] Heart  Text Page  (M-F 8:00 am - 4:30 pm)    Orders this Visit:  Orders Placed This Encounter   Procedures     EKG 12-lead complete w/read - Clinics (performed today)     No orders of the defined types were placed in this encounter.    Medications Discontinued During This Encounter   Medication Reason     oxyCODONE (ROXICODONE) 5 MG tablet Medication Reconciliation Clean Up  "    Encounter Diagnosis   Name Primary?     Paroxysmal ventricular tachycardia (H)        CURRENT MEDICATIONS:  Current Outpatient Medications   Medication Sig Dispense Refill     escitalopram (LEXAPRO) 10 MG tablet Take 1 tablet (10 mg) by mouth daily 90 tablet 3     fish oil-omega-3 fatty acids (OMEGA 3) 1000 MG capsule Take 1 capsule by mouth 2 times daily. Lavazo fish oil 180 capsule 1     fluticasone (FLONASE) 50 MCG/ACT nasal spray Spray 1-2 sprays into both nostrils daily (Patient taking differently: Spray 1-2 sprays into both nostrils daily as needed ) 1 Package 1     lisinopril (ZESTRIL) 20 MG tablet Take 1 tablet (20 mg) by mouth daily 90 tablet 0     magnesium oxide 200 MG TABS Take 200 mg by mouth daily       Multiple Vitamin (MULTI-VITAMIN) per tablet Take 1 tablet by mouth daily. 90 tablet 3     simvastatin (ZOCOR) 20 MG tablet TAKE ONE TABLET BY MOUTH EVERY NIGHT AT BEDTIME 90 tablet 3     SENNA-docusate sodium (SENNA S) 8.6-50 MG tablet Take 1-2 tablets by mouth 2 times daily as needed (if taking oxycodone) 30 tablet 0       ALLERGIES  Allergies   Allergen Reactions     Nkda [No Known Drug Allergies]        PAST MEDICAL, SURGICAL, SOCIAL FAMILY HISTORY:  History was reviewed and updated as needed, see medical record.    Review of Systems:  Skin:  Negative     Eyes:  Positive for glasses  ENT:  Negative    Respiratory:  Negative    Cardiovascular:  Negative    Gastroenterology: Negative    Genitourinary:  Negative    Musculoskeletal:  Negative    Neurologic:  Negative    Psychiatric:  Negative    Heme/Lymph/Imm:  Negative    Endocrine:  Negative       Physical Exam:  Vitals: BP (!) 138/90   Pulse 115   Ht 1.727 m (5' 8\")   Wt 104.6 kg (230 lb 11.2 oz)   SpO2 96%   BMI 35.08 kg/m     Wt Readings from Last 4 Encounters:   01/21/22 104.6 kg (230 lb 11.2 oz)   01/10/22 101.2 kg (223 lb)   12/28/21 108.4 kg (238 lb 14.4 oz)   12/15/21 105.4 kg (232 lb 6.4 oz)     CONSTITUTIONAL: Appears his stated " age, well nourished, and in no acute distress.  HEENT: Pupils equal, round. Sclerae nonicteric.    NECK: Supple, no masses appreciated. .  C/V:  Tachycardic in normal rhythm, normal S1 and S2, no S3 or S4, no murmur, rub or gallop.   RESP: Respirations are unlabored. Lungs are clear to auscultation bilaterally without wheezing, rales, or rhonchi.  GI: Abdomen soft, non-tender, non-distended.  EXTREM:  No clubbing, cyanosis, or lower extremity edema bilaterally.   NEURO: Alert and oriented, cooperative. Gait steady. No gross focal deficits.   PSYCH: Affect appropriate. Mentation normal. Responds to questions appropriately.  SKIN: Warm and dry. No apparent rashes or bruising.     Recent Lab Results:  CBC RESULTS:  Lab Results   Component Value Date    WBC 15.7 (H) 12/30/2021    WBC 6.6 04/20/2016    RBC 4.95 12/30/2021    RBC 5.01 04/20/2016    HGB 14.9 12/30/2021    HGB 14.9 04/20/2016    HCT 46.3 12/30/2021    HCT 44.8 04/20/2016    MCV 94 12/30/2021    MCV 89 04/20/2016    MCH 30.1 12/30/2021    MCH 29.7 04/20/2016    MCHC 32.2 12/30/2021    MCHC 33.3 04/20/2016    RDW 12.4 12/30/2021    RDW 12.4 04/20/2016     12/30/2021     04/20/2016     BMP RESULTS:  Lab Results   Component Value Date     12/30/2021     11/02/2020    POTASSIUM 3.9 12/30/2021    POTASSIUM 4.1 11/02/2020    CHLORIDE 103 12/30/2021    CHLORIDE 103 11/02/2020    CO2 28 12/30/2021    CO2 31 11/02/2020    ANIONGAP 6 12/30/2021    ANIONGAP 4 11/02/2020     (H) 12/30/2021    GLC 99 11/02/2020    BUN 13 12/30/2021    BUN 13 11/02/2020    CR 0.82 12/30/2021    CR 0.87 11/02/2020    GFRESTIMATED >90 12/30/2021    GFRESTIMATED >90 11/02/2020    GFRESTBLACK >90 11/02/2020    TD 9.6 12/30/2021    TD 9.3 11/02/2020          CC  Diandra Pope, APRN CNP  [address]  [place] [id number]    This note was completed in part using Dragon voice recognition software. Although reviewed after completion, some word and  grammatical errors may occur.

## 2022-01-21 ENCOUNTER — OFFICE VISIT (OUTPATIENT)
Dept: CARDIOLOGY | Facility: CLINIC | Age: 52
End: 2022-01-21
Payer: COMMERCIAL

## 2022-01-21 VITALS
SYSTOLIC BLOOD PRESSURE: 138 MMHG | HEART RATE: 115 BPM | BODY MASS INDEX: 34.96 KG/M2 | OXYGEN SATURATION: 96 % | WEIGHT: 230.7 LBS | DIASTOLIC BLOOD PRESSURE: 90 MMHG | HEIGHT: 68 IN

## 2022-01-21 DIAGNOSIS — I10 PRIMARY HYPERTENSION: ICD-10-CM

## 2022-01-21 DIAGNOSIS — I47.20 SUSTAINED VT (VENTRICULAR TACHYCARDIA) (H): Primary | ICD-10-CM

## 2022-01-21 DIAGNOSIS — Z98.890 S/P ABLATION OPERATION FOR ARRHYTHMIA: ICD-10-CM

## 2022-01-21 DIAGNOSIS — Z86.79 S/P ABLATION OPERATION FOR ARRHYTHMIA: ICD-10-CM

## 2022-01-21 PROCEDURE — 93000 ELECTROCARDIOGRAM COMPLETE: CPT | Performed by: NURSE PRACTITIONER

## 2022-01-21 PROCEDURE — 99214 OFFICE O/P EST MOD 30 MIN: CPT | Performed by: NURSE PRACTITIONER

## 2022-01-21 ASSESSMENT — MIFFLIN-ST. JEOR: SCORE: 1880.95

## 2022-01-21 NOTE — PATIENT INSTRUCTIONS
Come back to cardiology as needed.     If you have palpitations in the future call for a re-evaluation.    Please control what you can.    1. Control your weight, blood pressure, cholesterol, etc  2. Continue to exercise.    3. You have a mildly dilated aorta, your primary care monitor this.      If you have problems or questions please call the RNs (Enedelia Guaman & Lucinda) at [phone]

## 2022-01-21 NOTE — LETTER
1/21/2022    Padmini Thompson PA-C  [address] [address]    RE: Neptali Ospina       Dear Colleague,     I had the pleasure of seeing Neptali Ospina in the [facility].      Cardiology Clinic Progress Note    Service Date: 01/21/22    Primary Cardiologist: Dr. Hutton      Reason for Visit: Hospital follow-up    HPI:   I had the pleasure of seeing Mr. Neptali Ospina in the clinic today and he is a very pleasant 51 year old male with a past medical history notable for    1. Sustained monomorphic VT.  S/p VT ablation at [facility] (12/2021)  2. Anxiety  3. DARLING  4. Obesity  5. DANIELLE.  Uses CPAP.   6. Mild aortic root dilation    Diagnostics  I have personally reviewed the following reports    ECHO (12/2021)  Left ventricular systolic function is normal. The visual ejection fraction is 55-60%.  No regional wall motion abnormalities noted. The right ventricle is normal in structure, function and size. No evidence for significant valvular pathology. Mildly dilated aortic root (sinus of valsalva) 4.1 cm and mildly dilated ascending aorta, 3.8 cm.    Coronary catheretization (12/2021) showed normal coronary arteries.     MRI  (12/2021) Normal LV and RV systolic functions. LVEF 61%.  Late gadolinium enhancement imaging shows small size mid inferior wall subepicardial to mid myocardial delayed enhancement. No evidence of myocardial edema on T2 weighted images. Together these findings may represent prior myocarditis.    Zio patch (12/2021) revealed sinus rhythm with an average heart rate of 91 bpm, rare PACs and PVCs.  No triggered events    In December 2021, patient was urgently seen by Dr. Hutton due to sustained fast runs of ventricular tachycardia.  Patient triggered multiple times for ventricular tachycardia with duration 10-15 beats to over 3 minutes with an average heart rate of 250 bpm.  He was asked to proceed to the emergency room.   He underwent an echo which showed a normal EF,  he underwent a coronary catheterization which showed normal coronary arteries, his MRI showed small size mid inferior wall subepicardial and mid myocardial delayed enhancement.    MRI findings was incidental and no interventions were needed.    On 12/28/2021 he saw his primary his lisinopril was increased due to hypertension and a few days would later was in the emergency room with left upper quadrant pain after a fall however he was found to have community-acquired pneumonia      Today, he reports feeling good and denies palpitations,  chest pain, shortness of breath, dyspnea on exertion, orthopnea, PND, lower extremity edema, dizziness, presyncope, or syncope.   He is exercising without problems    ASSESSMENT AND PLAN:    Sustained monomorphic ventricular tachycardia    Noted to have episodes on Zio patch from 10-15 beats 2-3 minutes with an average heart rate of 250 bpm    S/p  RVOT ablation (12/2021) with repeat monitor showing normal sinus rhythm    Repeat zio patch shows no arrhythmias    Today's ECG shows sinus tachycardia at 115 bpm (pt is very anxious)    He denies palpitations     Encouraged 12-16 oz of coffee daily, no more than 2 servings of alcohol daily, exercise regularly, keep hydrated.      Hypertension    Slightly elevated in clinic but controlled at home while taking currently taking lisinopril.  He has white coat syndrome and we discussed treating his anxiety.       Plan:    Return to cardiology as needed    Control BP, weight, cholesterol and anxiety.  Continue to exercise    Please call the clinic if questions or problems arise      Thank you for the opportunity to participate in this pleasant patient's care. We would be happy to see him sooner if needed for any concerns in the meantime.        RONALD Ivey [facility] Heart  Text Page  (M-F 8:00 am - 4:30 pm)    Orders this Visit:  Orders Placed This Encounter   Procedures     EKG 12-lead complete w/read - Clinics (performed today)  "    No orders of the defined types were placed in this encounter.    Medications Discontinued During This Encounter   Medication Reason     oxyCODONE (ROXICODONE) 5 MG tablet Medication Reconciliation Clean Up     Encounter Diagnosis   Name Primary?     Paroxysmal ventricular tachycardia (H)        CURRENT MEDICATIONS:  Current Outpatient Medications   Medication Sig Dispense Refill     escitalopram (LEXAPRO) 10 MG tablet Take 1 tablet (10 mg) by mouth daily 90 tablet 3     fish oil-omega-3 fatty acids (OMEGA 3) 1000 MG capsule Take 1 capsule by mouth 2 times daily. Lavazo fish oil 180 capsule 1     fluticasone (FLONASE) 50 MCG/ACT nasal spray Spray 1-2 sprays into both nostrils daily (Patient taking differently: Spray 1-2 sprays into both nostrils daily as needed ) 1 Package 1     lisinopril (ZESTRIL) 20 MG tablet Take 1 tablet (20 mg) by mouth daily 90 tablet 0     magnesium oxide 200 MG TABS Take 200 mg by mouth daily       Multiple Vitamin (MULTI-VITAMIN) per tablet Take 1 tablet by mouth daily. 90 tablet 3     simvastatin (ZOCOR) 20 MG tablet TAKE ONE TABLET BY MOUTH EVERY NIGHT AT BEDTIME 90 tablet 3     SENNA-docusate sodium (SENNA S) 8.6-50 MG tablet Take 1-2 tablets by mouth 2 times daily as needed (if taking oxycodone) 30 tablet 0       ALLERGIES  Allergies   Allergen Reactions     Nkda [No Known Drug Allergies]        PAST MEDICAL, SURGICAL, SOCIAL FAMILY HISTORY:  History was reviewed and updated as needed, see medical record.    Review of Systems:  Skin:  Negative     Eyes:  Positive for glasses  ENT:  Negative    Respiratory:  Negative    Cardiovascular:  Negative    Gastroenterology: Negative    Genitourinary:  Negative    Musculoskeletal:  Negative    Neurologic:  Negative    Psychiatric:  Negative    Heme/Lymph/Imm:  Negative    Endocrine:  Negative       Physical Exam:  Vitals: BP (!) 138/90   Pulse 115   Ht 1.727 m (5' 8\")   Wt 104.6 kg (230 lb 11.2 oz)   SpO2 96%   BMI 35.08 kg/m     Wt " Readings from Last 4 Encounters:   01/21/22 104.6 kg (230 lb 11.2 oz)   01/10/22 101.2 kg (223 lb)   12/28/21 108.4 kg (238 lb 14.4 oz)   12/15/21 105.4 kg (232 lb 6.4 oz)     CONSTITUTIONAL: Appears his stated age, well nourished, and in no acute distress.  HEENT: Pupils equal, round. Sclerae nonicteric.    NECK: Supple, no masses appreciated. .  C/V:  Tachycardic in normal rhythm, normal S1 and S2, no S3 or S4, no murmur, rub or gallop.   RESP: Respirations are unlabored. Lungs are clear to auscultation bilaterally without wheezing, rales, or rhonchi.  GI: Abdomen soft, non-tender, non-distended.  EXTREM:  No clubbing, cyanosis, or lower extremity edema bilaterally.   NEURO: Alert and oriented, cooperative. Gait steady. No gross focal deficits.   PSYCH: Affect appropriate. Mentation normal. Responds to questions appropriately.  SKIN: Warm and dry. No apparent rashes or bruising.     Recent Lab Results:  CBC RESULTS:  Lab Results   Component Value Date    WBC 15.7 (H) 12/30/2021    WBC 6.6 04/20/2016    RBC 4.95 12/30/2021    RBC 5.01 04/20/2016    HGB 14.9 12/30/2021    HGB 14.9 04/20/2016    HCT 46.3 12/30/2021    HCT 44.8 04/20/2016    MCV 94 12/30/2021    MCV 89 04/20/2016    MCH 30.1 12/30/2021    MCH 29.7 04/20/2016    MCHC 32.2 12/30/2021    MCHC 33.3 04/20/2016    RDW 12.4 12/30/2021    RDW 12.4 04/20/2016     12/30/2021     04/20/2016     BMP RESULTS:  Lab Results   Component Value Date     12/30/2021     11/02/2020    POTASSIUM 3.9 12/30/2021    POTASSIUM 4.1 11/02/2020    CHLORIDE 103 12/30/2021    CHLORIDE 103 11/02/2020    CO2 28 12/30/2021    CO2 31 11/02/2020    ANIONGAP 6 12/30/2021    ANIONGAP 4 11/02/2020     (H) 12/30/2021    GLC 99 11/02/2020    BUN 13 12/30/2021    BUN 13 11/02/2020    CR 0.82 12/30/2021    CR 0.87 11/02/2020    GFRESTIMATED >90 12/30/2021    GFRESTIMATED >90 11/02/2020    GFRESTBLACK >90 11/02/2020    TD 9.6 12/30/2021    TD 9.3 11/02/2020           CC  RONALD Villarreal CNP  [address]  [place] [address]    This note was completed in part using Dragon voice recognition software. Although reviewed after completion, some word and grammatical errors may occur.      RONALD Ivey CNP   [facility]      cc:   RONALD Villarreal CNP  [address]  [place] [id number]

## 2022-01-25 ENCOUNTER — LAB (OUTPATIENT)
Dept: LAB | Facility: CLINIC | Age: 52
End: 2022-01-25
Attending: INTERNAL MEDICINE
Payer: COMMERCIAL

## 2022-01-25 DIAGNOSIS — Z11.59 ENCOUNTER FOR SCREENING FOR OTHER VIRAL DISEASES: ICD-10-CM

## 2022-01-25 PROCEDURE — U0005 INFEC AGEN DETEC AMPLI PROBE: HCPCS

## 2022-01-25 PROCEDURE — U0003 INFECTIOUS AGENT DETECTION BY NUCLEIC ACID (DNA OR RNA); SEVERE ACUTE RESPIRATORY SYNDROME CORONAVIRUS 2 (SARS-COV-2) (CORONAVIRUS DISEASE [COVID-19]), AMPLIFIED PROBE TECHNIQUE, MAKING USE OF HIGH THROUGHPUT TECHNOLOGIES AS DESCRIBED BY CMS-2020-01-R: HCPCS

## 2022-01-26 LAB — SARS-COV-2 RNA RESP QL NAA+PROBE: NEGATIVE

## 2022-01-27 ENCOUNTER — HOSPITAL ENCOUNTER (OUTPATIENT)
Facility: CLINIC | Age: 52
Discharge: HOME | End: 2022-01-27
Attending: INTERNAL MEDICINE | Admitting: INTERNAL MEDICINE
Payer: COMMERCIAL

## 2022-01-27 VITALS
OXYGEN SATURATION: 91 % | DIASTOLIC BLOOD PRESSURE: 91 MMHG | RESPIRATION RATE: 16 BRPM | HEART RATE: 99 BPM | SYSTOLIC BLOOD PRESSURE: 129 MMHG

## 2022-01-27 LAB — COLONOSCOPY: NORMAL

## 2022-01-27 PROCEDURE — 250N000011 HC RX IP 250 OP 636: Performed by: INTERNAL MEDICINE

## 2022-01-27 PROCEDURE — 88305 TISSUE EXAM BY PATHOLOGIST: CPT | Mod: 26 | Performed by: PATHOLOGY

## 2022-01-27 PROCEDURE — G0500 MOD SEDAT ENDO SERVICE >5YRS: HCPCS | Performed by: INTERNAL MEDICINE

## 2022-01-27 PROCEDURE — 45380 COLONOSCOPY AND BIOPSY: CPT | Performed by: INTERNAL MEDICINE

## 2022-01-27 PROCEDURE — 88305 TISSUE EXAM BY PATHOLOGIST: CPT | Mod: TC | Performed by: INTERNAL MEDICINE

## 2022-01-27 PROCEDURE — 258N000003 HC RX IP 258 OP 636: Performed by: INTERNAL MEDICINE

## 2022-01-27 RX ORDER — SIMETHICONE 40MG/0.6ML
133 SUSPENSION, DROPS(FINAL DOSAGE FORM)(ML) ORAL
Status: DISCONTINUED | OUTPATIENT
Start: 2022-01-27 | End: 2022-01-27 | Stop reason: HOSPADM

## 2022-01-27 RX ORDER — LIDOCAINE 40 MG/G
CREAM TOPICAL
Status: DISCONTINUED | OUTPATIENT
Start: 2022-01-27 | End: 2022-01-27 | Stop reason: HOSPADM

## 2022-01-27 RX ORDER — FENTANYL CITRATE 0.05 MG/ML
25-50 INJECTION, SOLUTION INTRAMUSCULAR; INTRAVENOUS
Status: DISCONTINUED | OUTPATIENT
Start: 2022-01-27 | End: 2022-01-27 | Stop reason: HOSPADM

## 2022-01-27 RX ORDER — NALOXONE HYDROCHLORIDE 0.4 MG/ML
0.4 INJECTION, SOLUTION INTRAMUSCULAR; INTRAVENOUS; SUBCUTANEOUS
Status: DISCONTINUED | OUTPATIENT
Start: 2022-01-27 | End: 2022-01-27 | Stop reason: HOSPADM

## 2022-01-27 RX ORDER — ONDANSETRON 4 MG/1
4 TABLET, ORALLY DISINTEGRATING ORAL EVERY 6 HOURS PRN
Status: DISCONTINUED | OUTPATIENT
Start: 2022-01-27 | End: 2022-01-27 | Stop reason: HOSPADM

## 2022-01-27 RX ORDER — FENTANYL CITRATE 0.05 MG/ML
50-100 INJECTION, SOLUTION INTRAMUSCULAR; INTRAVENOUS
Status: COMPLETED | OUTPATIENT
Start: 2022-01-27 | End: 2022-01-27

## 2022-01-27 RX ORDER — NALOXONE HYDROCHLORIDE 0.4 MG/ML
0.2 INJECTION, SOLUTION INTRAMUSCULAR; INTRAVENOUS; SUBCUTANEOUS
Status: DISCONTINUED | OUTPATIENT
Start: 2022-01-27 | End: 2022-01-27 | Stop reason: HOSPADM

## 2022-01-27 RX ORDER — ATROPINE SULFATE 0.4 MG/ML
0.4 AMPUL (ML) INJECTION
Status: DISCONTINUED | OUTPATIENT
Start: 2022-01-27 | End: 2022-01-27 | Stop reason: HOSPADM

## 2022-01-27 RX ORDER — FLUMAZENIL 0.1 MG/ML
0.2 INJECTION, SOLUTION INTRAVENOUS
Status: DISCONTINUED | OUTPATIENT
Start: 2022-01-27 | End: 2022-01-27 | Stop reason: HOSPADM

## 2022-01-27 RX ORDER — EPINEPHRINE 1 MG/ML
0.1 INJECTION, SOLUTION INTRAMUSCULAR; SUBCUTANEOUS
Status: DISCONTINUED | OUTPATIENT
Start: 2022-01-27 | End: 2022-01-27 | Stop reason: HOSPADM

## 2022-01-27 RX ORDER — ONDANSETRON 2 MG/ML
4 INJECTION INTRAMUSCULAR; INTRAVENOUS
Status: DISCONTINUED | OUTPATIENT
Start: 2022-01-27 | End: 2022-01-27 | Stop reason: HOSPADM

## 2022-01-27 RX ORDER — ONDANSETRON 2 MG/ML
4 INJECTION INTRAMUSCULAR; INTRAVENOUS
Status: COMPLETED | OUTPATIENT
Start: 2022-01-27 | End: 2022-01-27

## 2022-01-27 RX ORDER — PROCHLORPERAZINE MALEATE 10 MG
10 TABLET ORAL EVERY 6 HOURS PRN
Status: DISCONTINUED | OUTPATIENT
Start: 2022-01-27 | End: 2022-01-27 | Stop reason: HOSPADM

## 2022-01-27 RX ORDER — ONDANSETRON 2 MG/ML
4 INJECTION INTRAMUSCULAR; INTRAVENOUS EVERY 6 HOURS PRN
Status: DISCONTINUED | OUTPATIENT
Start: 2022-01-27 | End: 2022-01-27 | Stop reason: HOSPADM

## 2022-01-27 RX ORDER — FENTANYL CITRATE 0.05 MG/ML
50-100 INJECTION, SOLUTION INTRAMUSCULAR; INTRAVENOUS
Status: DISCONTINUED | OUTPATIENT
Start: 2022-01-27 | End: 2022-01-27 | Stop reason: HOSPADM

## 2022-01-27 RX ADMIN — FENTANYL CITRATE 100 MCG: 0.05 INJECTION, SOLUTION INTRAMUSCULAR; INTRAVENOUS at 09:14

## 2022-01-27 RX ADMIN — ONDANSETRON 4 MG: 2 INJECTION INTRAMUSCULAR; INTRAVENOUS at 09:10

## 2022-01-27 RX ADMIN — SODIUM CHLORIDE 500 ML: 9 INJECTION, SOLUTION INTRAVENOUS at 09:10

## 2022-01-27 RX ADMIN — MIDAZOLAM 2 MG: 1 INJECTION INTRAMUSCULAR; INTRAVENOUS at 09:14

## 2022-01-27 NOTE — DISCHARGE INSTRUCTIONS
The patient has received a copy of the Provation  report the doctor has written and discharge instructions have been discussed with the patient and responsible adult.  All questions were addressed and answered prior to patient discharge.      Understanding Colon and Rectal Polyps     The colon has a smooth lining composed of millions of cells.     The colon (also called the large intestine) is a muscular tube that forms the last part of the digestive tract. It absorbs water and stores food waste. The colon is about 4 to 6 feet long. The rectum is the last 6 inches of the colon. The colon and rectum have a smooth lining composed of millions of cells. Changes in these cells can lead to growths in the colon that can become cancerous and should be removed.     When the Colon Lining Changes  Changes that occur in the cells that line the colon or rectum can lead to growths called polyps. Over a period of years, polyps can turn cancerous. Removing polyps early may prevent cancer from ever forming.      Polyps  Polyps are fleshy clumps of tissue that form on the lining of the colon or rectum. Small polyps are usually benign (not cancerous). However, over time, cells in a polyp can change and become cancerous. The larger a polyp grows, the more likely this is to happen. Also, certain types of polyps known as adenomatous polyps are considered premalignant. This means that they will almost always become cancerous if they re not removed.          Cancer  Almost all colorectal cancers start when polyp cells begin growing abnormally. As a cancerous tumor grows, it may involve more and more of the colon or rectum. In time, cancer can also grow beyond the colon or rectum and spread to nearby organs or to glands called lymph nodes. The cells can also travel to other parts of the body. This is known as metastasis. The earlier a cancerous tumor is removed, the better the chance of preventing its spread.        6970-7209 Pam  [employer], [address] [place] [address], [place] [address]. All rights reserved. This information is not intended as a substitute for professional medical care. Always follow your healthcare professional's instructions.

## 2022-01-27 NOTE — LETTER
December 9, 2021      Neptali Ospina  [address] [address] [facility] [place] [address]        Dear Neptali,     Please be aware that coverage of these services is subject to the terms and limitations of your health insurance plan.  Call member services at your health plan with any benefit or coverage questions.    Thank you for choosing [facility]. You are scheduled for the following service(s):    Date:  Friday, December 31, 2021            Procedure:  COLONOSCOPY  Doctor:        Dr. Po Melendrez   Arrival Time:  0745am *Enter and check in at the Main Hospital Entrance*  Procedure Time:  0830am     Location:   [facility] [place] [facility]        Endoscopy Department, First Floor         [address] [place], [place] [address]      [phone] or [phone] ([facility]) to reschedule        MIRALAX -GATORADE  PREP  Colonoscopy is the most accurate test to detect colon polyps and colon cancer; and the only test where polyps can be removed. During this procedure, a doctor examines the lining of your large intestine and rectum through a flexible tube.   Transportation  You must arrange for a ride for the day of your procedure with a responsible adult. A taxi , Uber, etc, is not an option unless you are accompanied by a responsible adult. If you fail to arrange transportation with a responsible adult, your procedure will be cancelled and rescheduled.    Purchase the  following supplies at your local pharmacy:  - 2 (two) bisacodyl tablets: each tablet contains 5 mg.  (Dulcolax  laxative NOT Dulcolax  stool softener)   - 1 (one) 8.3 oz bottle of Polyethylene Glycol (PEG) 3350 Powder   (MiraLAX , Smooth LAX , ClearLAX  or equivalent)  - 64 oz Gatorade    Regular Gatorade, Gatorade G2 , Powerade , Powerade Zero  or Pedialyte  is acceptable. Red colored flavors are not allowed; all other colors (yellow, green, orange, purple and blue) are okay. It is also okay to buy two 2.12 oz packets of  powdered Gatorade that can be mixed with water to a total volume of 64 oz of liquid.  - 1 (one) 10 oz bottle of Magnesium Citrate (Red colored flavors are not allowed)  It is also okay for you to use a 0.5 oz package of powdered magnesium citrate (17 g) mixed with 10 oz of water.      PREPARATION FOR COLONOSCOPY    7 days before:    Discontinue fiber supplements and medications containing iron. This includes Metamucil  and Fibercon ; and multivitamins with iron.    3 days before:    Begin a low-fiber diet. A low-fiber diet helps making the cleanout more effective.     Examples of a low-fiber diet include (but are not limited to): white bread, white rice, pasta, crackers, fish, chicken, eggs, ground beef, creamy peanut butter, cooked/steamed/boiled vegetables, canned fruit, bananas, melons, milk, plain yogurt cheese, salad dressing and other condiments.     The following are not allowed on a low-fiber diet: seeds, nuts, popcorn, bran, whole wheat, corn, quinoa, raw fruits and vegetables, berries and dried fruit, beans and lentils.    For additional details on low-fiber diet, please refer to the table on the last page.    2 days before:    Continue the low-fiber diet.     Drink at least 8 glasses of water throughout the day.     Stop eating solid foods at 11:45 pm.    1 day before:    In the morning: begin a clear liquid diet (liquids you can see through).     Examples of a clear liquid diet include: water, clear broth or bouillon, Gatorade, Pedialyte or Powerade, carbonated and non-carbonated soft drinks (Sprite , 7-Up , ginger ale), strained fruit juices without pulp (apple, white grape, white cranberry), Jell-O  and popsicles.     The following are not allowed on a clear liquid diet: red liquids, alcoholic beverages, dairy products (milk, creamer, and yogurt), protein shakes, creamy broths, juice with pulp and chewing tobacco.    At noon: take 2 (two) bisacodyl tablets     At 4 (and no later than 6pm): start  drinking the Miralax-Gatorade preparation (8.3 oz of Miralax mixed with 64 oz of Gatorade in a large pitcher). Drink 1(one) 8 oz glass every 15 minutes thereafter, until the mixture is gone.  COLON CLEANSING TIPS: drink adequate amounts of fluids before and after your colon cleansing to prevent dehydration. Stay near a toilet because you will have diarrhea. Even if you are sitting on the toilet, continue to drink the cleansing solution every 15 minutes. If you feel nauseous or vomit, rinse your mouth with water, take a 15 to 30-minute-break and then continue drinking the solution. You will be uncomfortable until the stool has flushed from your colon (in about 2 to 4 hours). You may feel chilled.    Day of your procedure  You may take all of your morning medications including blood pressure medications, blood thinners (if you have not been instructed to stop these by our office), methadone, anti-seizure medications with sips of water 3 hours prior to your procedure or earlier. Do not take insulin or vitamins prior to your procedure. Continue the clear liquid diet.     4 hours prior: drink 10 oz of magnesium citrate. It may be easier to drink it with a straw.    STOP consuming all liquids after that.     Do not take anything by mouth during this time.     Allow extra time to travel to your procedure as you may need to stop and use a restroom along the way.    You are ready for the procedure, if you followed all instructions and your stool is no longer formed, but clear or yellow liquid. If you are unsure whether your colon is clean, please call our office at [phone] before you leave for your appointment.    Bring the following to your procedure:  - Insurance Card/Photo ID.   - List of current medications including over-the-counter medications and supplements.   - Your rescue inhaler if you currently use one to control asthma.    Canceling or rescheduling your appointment:   If you must cancel or reschedule your  appointment, please call [phone] as soon as possible.  COLONOSCOPY PRE-PROCEDURE CHECKLIST    If you have diabetes, ask your regular doctor for diet and medication restrictions.  If you take an anticoagulant or anti-platelet medication (such as Coumadin , Lovenox , Pradaxa , Xarelto , Eliquis , etc.), please call your primary doctor for advice on holding this medication.  If you take aspirin you may continue to do so.  If you are or may be pregnant, please discuss the risks and benefits of this procedure with your doctor.    What happens during a colonoscopy?    Plan to spend up to two hours, starting at registration time, at the endoscopy center the day of your procedure. The colonoscopy takes an average of 15 to 30 minutes. Recovery time is about 30 minutes.      Before the exam:    You will change into a gown.    Your medical history and medication list will be reviewed with you, unless that has been done over the phone prior to the procedure.     A nurse will insert an intravenous (IV) line into your hand or arm.    The doctor will meet with you and will give you a consent form to sign.  During the exam:     Medicine will be given through the IV line to help you relax.     Your heart rate and oxygen levels will be monitored. If your blood pressure is low, you may be given fluids through the IV line.     The doctor will insert a flexible hollow tube, called a colonoscope, into your rectum. The scope will be advanced slowly through the large intestine (colon).    You may have a feeling of fullness or pressure.     If an abnormal tissue or a polyp is found, the doctor may remove it through the endoscope for closer examination, or biopsy. Tissue removal is painless    After the exam:           Any tissue samples removed during the exam will be sent to a lab for evaluation. It may take 5-7 working days for you to be notified of the results.     A nurse will provide you with complete discharge instructions before  you leave the endoscopy center. Be sure to ask the nurse for specific instructions if you take blood thinners such as Aspirin, Coumadin or Plavix.     The doctor will prepare a full report for you and for the physician who referred you for the procedure.     Your doctor will talk with you about the initial results of your exam.      Medication given during the exam will prohibit you from driving for the rest of the day.     Following the exam, you may resume your normal diet. Your first meal should be light, no greasy foods. Avoid alcohol until the next day.     You may resume your regular activities the day after the procedure.         LOW-FIBER DIET    Foods RECOMMENDED Foods to AVOID   Breads, Cereal, Rice and Pasta:   White bread, rolls, biscuits, croissant and ronald toast.   Waffles, Faroese toast and pancakes.   White rice, noodles, pasta, macaroni and peeled cooked potatoes.   Plain crackers and saltines.   Cooked cereals: farina, cream of rice.   Cold cereals: Puffed Rice , Rice Krispies , Corn Flakes  and Special K    Breads, Cereal, Rice and Pasta:   Breads or rolls with nuts, seeds or fruit.   Whole wheat, pumpernickel, rye breads and cornbread.   Potatoes with skin, brown or wild rice, and kasha (buckwheat).     Vegetables:   Tender cooked and canned vegetables without seeds: carrots, asparagus tips, green or wax beans, pumpkin, spinach, lima beans. Vegetables:   Raw or steamed vegetables.   Vegetables with seeds.   Sauerkraut.   Winter squash, peas, broccoli, Brussel sprouts, cabbage, onions, cauliflower, baked beans, peas and corn.   Fruits:   Strained fruit juice.   Canned fruit, except pineapple.   Ripe bananas and melon. Fruits:   Prunes and prune juice.   Raw fruits.   Dried fruits: figs, dates and raisins.   Milk/Dairy:   Milk: plain or flavored.   Yogurt, custard and ice cream.   Cheese and cottage cheese Milk/Dairy:     Meat and other proteins:   ground, well-cooked tender beef, lamb, ham, veal,  pork, fish, poultry and organ meats.   Eggs.   Peanut butter without nuts. Meat and other proteins:   Tough, fibrous meats with gristle.   Dry beans, peas and lentils.   Peanut butter with nuts.   Tofu.   Fats, Snack, Sweets, Condiments and Beverages:   Margarine, butter, oils, mayonnaise, sour cream and salad dressing, plain gravy.   Sugar, hard candy, clear jelly, honey and syrup.   Spices, cooked herbs, bouillon, broth and soups made with allowed vegetable, ketchup and mustard.   Coffee, tea and carbonated drinks.   Plain cakes, cookies and pretzels.   Gelatin, plain puddings, custard, ice cream, sherbet and popsicles. Fats, Snack, Sweets, Condiments and Beverages:   Nuts, seeds and coconut.   Jam, marmalade and preserves.   Pickles, olives, relish and horseradish.   All desserts containing nuts, seeds, dried fruit and coconut; or made from whole grains or bran.   Candy made with nuts or seeds.   Popcorn.     DIRECTIONS TO THE ENDOSCOPY DEPARTMENT    From the north ([facility])  Take [address] South, exit on [address] [address]. Get into the left hand michael, turn left (east), go one-half mile to [address] and turn left. Go north to the second stoplight, take a right on [address] and follow it to the Main Hospital entrance.    From the south ([facility])  Take 35N to the 35E split and exit on [address] [address]. On [address] [address], turn left (west) to [address]. Turn right (north) on [address]. Go north to the second stoplight, take a right on [address] and follow it to the Main Hospital entrance.    From the east via 35E ([facility])  Take [address] south to [address] [address] exit. Turn right on [address] [address]. Go west to [address]. Turn right (north) on [address]. Go to the second stoplight, take a right on [address] to the Main Hospital entrance.    From the east via [address] ([facility])  Take [address] to [address].  Turn left (south) on [address] to [address], turn left (east) on [address] and follow it to the Main Hospital entrance.    From the west via [address] ([place], [place])  Take [address] east to [address]. Turn right (south) on [address] to [address], turn left (east) on [address] and follow it to the Main Hospital entrance.      Sincerely,        [facility] Endoscopy Department

## 2022-01-27 NOTE — H&P
Pre-Endoscopy History and Physical     Neptali Ospina MRN# [id number]   YOB: 1970 Age: 51 year old     Date of Procedure: 1/27/2022  Primary care provider: Padmini Thompson  Type of Endoscopy: Colonoscopy with possible biopsy, possible polypectomy  Reason for Procedure: screen  Type of Anesthesia Anticipated: Conscious Sedation    HPI:    Neptali is a 51 year old male who will be undergoing the above procedure.      A history and physical has been performed. The patient's medications and allergies have been reviewed. The risks and benefits of the procedure and the sedation options and risks were discussed with the patient.  All questions were answered and informed consent was obtained.      He denies a personal or family history of anesthesia complications or bleeding disorders.     Patient Active Problem List   Diagnosis     Obesity     DANIELLE on CPAP     Vitamin D deficiency     Left thyroid nodule     Anxiety     White coat syndrome without diagnosis of hypertension     CARDIOVASCULAR SCREENING; LDL GOAL LESS THAN 160     Morbid obesity (H)     Paroxysmal ventricular tachycardia (H)        Past Medical History:   Diagnosis Date     High triglycerides      DARLING (nonalcoholic steatohepatitis)     12/10      DANIELLE on CPAP      Palpitations      Paroxysmal ventricular tachycardia (H)      Strabismus      Thyroid nodule 02/2012    biopsy thyroglossal ductal cyst.  see Endo     Vitamin D deficiency         Past Surgical History:   Procedure Laterality Date     CV HEART CATHETERIZATION WITH POSSIBLE INTERVENTION N/A 12/10/2021    Procedure: Heart Catheterization with Possible Intervention;  Surgeon: Quyen Lomas MD;  Location:  HEART CARDIAC CATH LAB     EP ABLATION VT/PVC N/A 12/14/2021    Procedure: EP Ablation VT;  Surgeon: Zhen Saavedra MD;  Location:  HEART CARDIAC CATH LAB     PE TUBES       TONSILLECTOMY & ADENOIDECTOMY       VASECTOMY         Social History     Tobacco Use      "Smoking status: Former Smoker     Smokeless tobacco: Never Used   Substance Use Topics     Alcohol use: Yes     Alcohol/week: 0.0 standard drinks     Comment: very occ.       Family History   Problem Relation Age of Onset     C.A.D. Mother      Cancer Mother         brain tumor     Circulatory Father         sleep apnea,   in sleep 58     Sleep Apnea Father      Gynecology Sister         x2       Prior to Admission medications    Medication Sig Start Date End Date Taking? Authorizing Provider   escitalopram (LEXAPRO) 10 MG tablet Take 1 tablet (10 mg) by mouth daily 21   Padmini Thompson PA-C   fish oil-omega-3 fatty acids (OMEGA 3) 1000 MG capsule Take 1 capsule by mouth 2 times daily. Lavazo fish oil 12   Beth Alvarado MD   fluticasone (FLONASE) 50 MCG/ACT nasal spray Spray 1-2 sprays into both nostrils daily  Patient taking differently: Spray 1-2 sprays into both nostrils daily as needed  13   Mary Stewart MD   lisinopril (ZESTRIL) 20 MG tablet Take 1 tablet (20 mg) by mouth daily 21   Padmini Thompson PA-C   magnesium oxide 200 MG TABS Take 200 mg by mouth daily    Unknown, Entered By History   Multiple Vitamin (MULTI-VITAMIN) per tablet Take 1 tablet by mouth daily. 1/10/12   Beth Alvarado MD   SENNA-docusate sodium (SENNA S) 8.6-50 MG tablet Take 1-2 tablets by mouth 2 times daily as needed (if taking oxycodone) 21  Liu Duncan MD   simvastatin (ZOCOR) 20 MG tablet TAKE ONE TABLET BY MOUTH EVERY NIGHT AT BEDTIME 21   Padmini Thompson PA-C       Allergies   Allergen Reactions     Nkda [No Known Drug Allergies]         REVIEW OF SYSTEMS:   5 point ROS negative except as noted above in HPI, including Gen., Resp., CV, GI &  system review.    PHYSICAL EXAM:   There were no vitals taken for this visit. Estimated body mass index is 35.08 kg/m  as calculated from the following:    Height as of 22: 1.727 m (5' 8\").    Weight " as of 1/21/22: 104.6 kg (230 lb 11.2 oz).   GENERAL APPEARANCE: alert, and oriented  MENTAL STATUS: alert  AIRWAY EXAM: Mallampatti Class I (visualization of the soft palate, fauces, uvula, anterior and posterior pillars)  RESP: lungs clear to auscultation - no rales, rhonchi or wheezes  CV: regular rates and rhythm  DIAGNOSTICS:    Not indicated    IMPRESSION   ASA Class 1 - Healthy patient, no medical problems    PLAN:   Plan for Colonoscopy with possible biopsy, possible polypectomy. We discussed the risks, benefits and alternatives and the patient wished to proceed.    The above has been forwarded to the consulting provider.      Signed Electronically by: Po Melendrez MD  January 27, 2022

## 2022-01-28 ENCOUNTER — OFFICE VISIT (OUTPATIENT)
Dept: FAMILY MEDICINE | Facility: CLINIC | Age: 52
End: 2022-01-28
Payer: COMMERCIAL

## 2022-01-28 VITALS
TEMPERATURE: 97.8 F | RESPIRATION RATE: 16 BRPM | BODY MASS INDEX: 34.67 KG/M2 | WEIGHT: 228 LBS | SYSTOLIC BLOOD PRESSURE: 130 MMHG | DIASTOLIC BLOOD PRESSURE: 80 MMHG | OXYGEN SATURATION: 96 % | HEART RATE: 81 BPM

## 2022-01-28 DIAGNOSIS — E66.01 MORBID OBESITY (H): ICD-10-CM

## 2022-01-28 DIAGNOSIS — I10 ESSENTIAL HYPERTENSION: Primary | ICD-10-CM

## 2022-01-28 PROBLEM — B00.9 HSV-1 INFECTION: Status: RESOLVED | Noted: 2018-03-13 | Resolved: 2022-01-28

## 2022-01-28 PROBLEM — B00.9 HSV-1 INFECTION: Status: ACTIVE | Noted: 2018-03-13

## 2022-01-28 LAB
ANION GAP SERPL CALCULATED.3IONS-SCNC: 4 MMOL/L (ref 3–14)
BUN SERPL-MCNC: 15 MG/DL (ref 7–30)
CALCIUM SERPL-MCNC: 9.4 MG/DL (ref 8.5–10.1)
CHLORIDE BLD-SCNC: 106 MMOL/L (ref 94–109)
CO2 SERPL-SCNC: 30 MMOL/L (ref 20–32)
CREAT SERPL-MCNC: 0.93 MG/DL (ref 0.66–1.25)
GFR SERPL CREATININE-BSD FRML MDRD: >90 ML/MIN/1.73M2
GLUCOSE BLD-MCNC: 95 MG/DL (ref 70–99)
PATH REPORT.COMMENTS IMP SPEC: NORMAL
PATH REPORT.COMMENTS IMP SPEC: NORMAL
PATH REPORT.FINAL DX SPEC: NORMAL
PATH REPORT.GROSS SPEC: NORMAL
PATH REPORT.MICROSCOPIC SPEC OTHER STN: NORMAL
PATH REPORT.RELEVANT HX SPEC: NORMAL
PHOTO IMAGE: NORMAL
POTASSIUM BLD-SCNC: 4.2 MMOL/L (ref 3.4–5.3)
SODIUM SERPL-SCNC: 140 MMOL/L (ref 133–144)

## 2022-01-28 PROCEDURE — 99213 OFFICE O/P EST LOW 20 MIN: CPT | Mod: 25 | Performed by: PHYSICIAN ASSISTANT

## 2022-01-28 PROCEDURE — 90750 HZV VACC RECOMBINANT IM: CPT | Performed by: PHYSICIAN ASSISTANT

## 2022-01-28 PROCEDURE — 90471 IMMUNIZATION ADMIN: CPT | Performed by: PHYSICIAN ASSISTANT

## 2022-01-28 PROCEDURE — 80048 BASIC METABOLIC PNL TOTAL CA: CPT | Performed by: PHYSICIAN ASSISTANT

## 2022-01-28 PROCEDURE — 36415 COLL VENOUS BLD VENIPUNCTURE: CPT | Performed by: PHYSICIAN ASSISTANT

## 2022-01-28 RX ORDER — LISINOPRIL 20 MG/1
20 TABLET ORAL DAILY
Qty: 90 TABLET | Refills: 3 | Status: SHIPPED | OUTPATIENT
Start: 2022-01-28 | End: 2023-01-24

## 2022-01-28 ASSESSMENT — PAIN SCALES - GENERAL: PAINLEVEL: NO PAIN (0)

## 2022-01-28 NOTE — PROGRESS NOTES
Assessment & Plan     Essential hypertension  Appears to be controlled with lisinopril 20 mg daily. Refills given.  - Basic metabolic panel  (Ca, Cl, CO2, Creat, Gluc, K, Na, BUN); Future  - lisinopril (ZESTRIL) 20 MG tablet; Take 1 tablet (20 mg) by mouth daily  - Basic metabolic panel  (Ca, Cl, CO2, Creat, Gluc, K, Na, BUN)    Morbid obesity (H)  He has lost weight recently. Started after he had pneumonia and did not feel well, but he is using this as motivation to continue working on lifestyle changes.    Risks and benefits of treatment plan discussed. Patient and/or parent acknowledges and agrees with plan of care, all questions answered.     Return in about 10 months (around 11/28/2022) for Preventive Physical Exam.    Padmini Thompson PA-C  [facility] TWILA Gunderson is a 51 year old who presents for the following health issues     History of Present Illness       Hypertension: He presents for follow up of hypertension.  He does check blood pressure  regularly outside of the clinic. Outside blood pressures have been over 140/90. He does not follow a low salt diet.     He eats 0-1 servings of fruits and vegetables daily.He consumes 1 sweetened beverage(s) daily.He exercises with enough effort to increase his heart rate 60 or more minutes per day.  He exercises with enough effort to increase his heart rate 6 days per week. He is missing 1 dose(s) of medications per week.     Here for blood pressure check. Lisinopril increased to 20 mg daily about 1 month ago. He is not checking his blood pressure at home. He feels well, no medication side effects. No chest pain, shortness of breath, swelling in the extremities, palpitations, dizziness, headaches, blurred vision.     Review of Systems   Constitutional, HEENT, cardiovascular, pulmonary, gi and gu systems are negative, except as otherwise noted.      Objective    /80   Pulse 81   Temp 97.8  F (36.6  C) (Oral)   Resp 16    Wt 103.4 kg (228 lb)   SpO2 96%   BMI 34.67 kg/m    Body mass index is 34.67 kg/m .  Physical Exam   GENERAL: healthy, alert and no distress  RESP: lungs clear to auscultation - no rales, rhonchi or wheezes  CV: regular rate and rhythm, normal S1 S2, no S3 or S4, no murmur  NEURO: Normal strength and tone, mentation intact and speech normal  PSYCH: mentation appears normal, affect normal/bright

## 2022-02-12 ENCOUNTER — E-VISIT (OUTPATIENT)
Dept: FAMILY MEDICINE | Facility: CLINIC | Age: 52
End: 2022-02-12
Payer: COMMERCIAL

## 2022-02-12 DIAGNOSIS — F40.243 ANXIETY WITH FLYING: Primary | ICD-10-CM

## 2022-02-12 PROCEDURE — 99421 OL DIG E/M SVC 5-10 MIN: CPT | Performed by: PHYSICIAN ASSISTANT

## 2022-02-12 ASSESSMENT — ANXIETY QUESTIONNAIRES
4. TROUBLE RELAXING: NOT AT ALL
7. FEELING AFRAID AS IF SOMETHING AWFUL MIGHT HAPPEN: NOT AT ALL
GAD7 TOTAL SCORE: 0
1. FEELING NERVOUS, ANXIOUS, OR ON EDGE: NOT AT ALL
8. IF YOU CHECKED OFF ANY PROBLEMS, HOW DIFFICULT HAVE THESE MADE IT FOR YOU TO DO YOUR WORK, TAKE CARE OF THINGS AT HOME, OR GET ALONG WITH OTHER PEOPLE?: NOT DIFFICULT AT ALL
GAD7 TOTAL SCORE: 0
GAD7 TOTAL SCORE: 0
5. BEING SO RESTLESS THAT IT IS HARD TO SIT STILL: NOT AT ALL
2. NOT BEING ABLE TO STOP OR CONTROL WORRYING: NOT AT ALL
7. FEELING AFRAID AS IF SOMETHING AWFUL MIGHT HAPPEN: NOT AT ALL
6. BECOMING EASILY ANNOYED OR IRRITABLE: NOT AT ALL
3. WORRYING TOO MUCH ABOUT DIFFERENT THINGS: NOT AT ALL

## 2022-02-13 ASSESSMENT — ANXIETY QUESTIONNAIRES: GAD7 TOTAL SCORE: 0

## 2022-02-14 RX ORDER — LORAZEPAM 0.5 MG/1
0.5 TABLET ORAL EVERY 6 HOURS PRN
Qty: 4 TABLET | Refills: 0 | Status: SHIPPED | OUTPATIENT
Start: 2022-02-14 | End: 2023-01-09

## 2022-04-12 ENCOUNTER — OFFICE VISIT (OUTPATIENT)
Dept: PODIATRY | Facility: CLINIC | Age: 52
End: 2022-04-12
Payer: COMMERCIAL

## 2022-04-12 VITALS
SYSTOLIC BLOOD PRESSURE: 160 MMHG | DIASTOLIC BLOOD PRESSURE: 74 MMHG | HEIGHT: 68 IN | BODY MASS INDEX: 36.37 KG/M2 | WEIGHT: 240 LBS

## 2022-04-12 DIAGNOSIS — M79.672 ACUTE PAIN OF LEFT FOOT: Primary | ICD-10-CM

## 2022-04-12 DIAGNOSIS — L60.0 INGROWN NAIL OF GREAT TOE OF LEFT FOOT: ICD-10-CM

## 2022-04-12 PROCEDURE — 11750 EXCISION NAIL&NAIL MATRIX: CPT | Mod: TA | Performed by: PODIATRIST

## 2022-04-12 RX ORDER — SILVER SULFADIAZINE 10 MG/G
CREAM TOPICAL 2 TIMES DAILY
Qty: 25 G | Refills: 1 | Status: SHIPPED | OUTPATIENT
Start: 2022-04-12 | End: 2023-01-24

## 2022-04-12 NOTE — PATIENT INSTRUCTIONS
Thank you for choosing [facility] Podiatry / Foot & Ankle Surgery!    DR TAVARES'S CLINIC:  [facility]   [address]   [place] [address]      TRIAGE LINE: [phone]  APPOINTMENTS: [phone]  RADIOLOGY: [phone]  SET UP SURGERY: [phone]  FAX NUMBER: [phone]  BILLING QUESTIONS: [phone]       Follow up: as needed.       INGROWN TOENAILS  When a toenail is ingrown, it is curved and grows into the skin, usually at the nail borders (the sides of the nail). This  digging in  of the nail irritates the skin, often creating pain, redness, swelling, and warmth in the toe.  If an ingrown nail causes a break in the skin, bacteria may enter and cause an infection in the area, which is often marked by drainage and a foul odor. However, even if the toe isn t painful, red, swollen, or warm, a nail that curves downward into the skin can progress to an infection.  CAUSES:  Heredity: In many people, the tendency for ingrown toenails is inherited.   Trauma: Sometimes an ingrown toenail is the result of trauma, such as stubbing your toe, having an object fall on your toe, or engaging in activities that involve repeated pressure on the toes, such as kicking or running.   Improper Trimming:  The most common cause of ingrown toenails is cutting your nails too short. This encourages the skin next to the nail to fold over the nail.   Improperly Sized Footwear: Ingrown toenails can result from wearing socks and shoes that are tight or short.   Nail Conditions: Ingrown toenails can be caused by nail problems, such as fungal infections or losing a nail due to trauma.   TREATMENT: Sometimes initial treatment for ingrown toenails can be safely performed at home. However, home treatment is strongly discouraged if an infection is suspected, or for those who have medical conditions that put feet at high risk, such as diabetes, nerve damage in the foot, or poor circulation.  Home care: If  you don t have an infection or any of the above medical conditions, you can soak your foot in room-temperature water (adding Epsom s salt may be recommended by your doctor), and gently massage the side of the nail fold to help reduce the inflammation.  Avoid attempting  bathroom surgery.  Repeated cutting of the nail can cause the condition to worsen over time. If your symptoms fail to improve, it s time to see a foot and ankle surgeon.  Physician care: After examining the toe, the foot and ankle surgeon will select the treatment best suited for you. If an infection is present, an oral antibiotic may be prescribed.  Sometimes a minor surgical procedure, often performed in the office, will ease the pain and remove the offending nail. After applying a local anesthetic, the doctor removes part of the nail s side border. Some nails may become ingrown again, requiring removal of the nail root.  Following the nail procedure, a light bandage will be applied. Most people experience very little pain after surgery and may resume normal activity the next day. If your surgeon has prescribed an oral antibiotic, be sure to take all the medication, even if your symptoms have improved.  PREVENTION:  Proper Trimming: Cut toenails in a fairly straight line, and don t cut them too short. You should be able to get your fingernail under the sides and end of the nail.   Well-fitting Footwear: Don t wear shoes that are short or tight in the toe area. Avoid shoes that are loose, because they too cause pressure on the toes, especially when running or walking briskly.     INGROWN TOENAIL REMOVAL AFTERCARE   Go directly home and elevate the affected foot on one or two pillows for the remainder of the day/evening if possible. Your toe may stay numb anywhere from 2-8 hours.   Take Tylenol, ibuprofen or another anti-inflammatory as needed for pain.   Take antibiotic if that has been prescribed. Finish the entire prescribed antibiotic even if  your symptoms have improved.   The evening of the procedure, soak/wash the affected area in warm water (you may add Epsom salt) for 5 to 10 minutes. Do this twice a day for 2-4 weeks (6-8 weeks if you had phenol) (you may count showering/bathing as one soak).  After soaks, pat the area dry and then allow to airdry for a few minutes. Apply antibiotic ointment to the area and cover with 2 X 2 gauze and paper tape or band-aid.  You may pursue everyday activities as tolerated with either an open toe shoe or cut-out shoe as needed or you may wear regular shoes if no pain is noted.  Watch for any signs and symptoms of infection such as: redness, red streaks going up the foot/leg, swelling, pus or foul odor. Those that have had the phenol procedure, the toe will drain longer and will look like it is infected because it is a chemical burn.   Please call with questions.

## 2022-04-12 NOTE — LETTER
"    4/12/2022         RE: Neptali Ospina  [address] [facility] [address]        Dear Colleague,    Thank you for referring your patient, Neptali Ospina, to the [facility]OD[facility]. Please see a copy of my visit note below.    Podiatry / Foot and Ankle Surgery Progress Note    April 12, 2022    Subject: Patient was seen for recurrent painful ingrown nail on the left great toe.  Notes that it sore when it is bumped.  Can be 5 out of 10.  Denies fever, nausea, vomiting.  Wondering about a more permanent solution for the nail.  No other concerns today.    Objective:  Vitals: Ht 1.727 m (5' 8\")   BMI 34.67 kg/m    BMI= Body mass index is 34.67 kg/m .    General:  Patient is alert and orientated.  NAD.    Dermatologic: lateral border of the left great toenail is incurvated.  Localized redness and pain on palpation     Vascular: DP & PT pulses are intact & regular bilaterally.  No significant edema or varicosities noted.  CFT and skin temperature is normal to both lower extremities.     Neurologic: Lower extremity sensation is intact to light touch.  No evidence of weakness or contracture in the lower extremities.  No evidence of neuropathy.     Musculoskeletal: Patient is ambulatory without assistive device or brace.  No gross ankle deformity noted.  No foot or ankle joint effusion is noted.     ASSESSMENT:    Left foot pain  Ingrown nail of great toe of left foot     Medical Decision Making/Plan:  Reviewed patient's chart in [facility]. The potential causes and nature of an ingrown toenail were discussed with the patient.  We reviewed the natural history/prognosis of the condition and potential risks if no treatment is provided.      Treatment options discussed included conservative management (oral antibiotics, soaking of foot, adequate width shoes)  as well as surgical management (partial or total nail removal).  The pros and cons of both forms of treatment were reviewed.      After " thorough discussion and answering all questions, the patient elected to have the border removed.  He will soak the foot twice a day for 6 weeks and apply silvadene cream and a Band-Aid.  All questions were answered to patient satisfaction he will call further questions or concerns.      Procedure: After verbal consent, the left big toe was anesthetized with 5cc's of 1% lidocaine plain. A tourniquet was applied to the toe. The medial border was then raised from the nail bed and then cut the length of the nail.  The offending nail border was then removed.  Three 30 second applications of phenol were applied to the nail bed and nail matrix.  The area was then flushed with copious amounts of alcohol.  Bacitracin was applied to the nail bed.  The tourniquet was removed.  Bandage was applied to the toe.  The patient tolerated the procedure and anesthesia well.      Patient risk factor: Patient is at low risk for infection.      Thalia Prado DPM, Podiatry/Foot and Ankle Surgery        Again, thank you for allowing me to participate in the care of your patient.        Sincerely,        Thalia Prado DPM, Podiatry/Foot and Ankle Surgery

## 2022-04-12 NOTE — PROGRESS NOTES
"Podiatry / Foot and Ankle Surgery Progress Note    April 12, 2022    Subject: Patient was seen for recurrent painful ingrown nail on the left great toe.  Notes that it sore when it is bumped.  Can be 5 out of 10.  Denies fever, nausea, vomiting.  Wondering about a more permanent solution for the nail.  No other concerns today.    Objective:  Vitals: Ht 1.727 m (5' 8\")   BMI 34.67 kg/m    BMI= Body mass index is 34.67 kg/m .    General:  Patient is alert and orientated.  NAD.    Dermatologic: lateral border of the left great toenail is incurvated.  Localized redness and pain on palpation     Vascular: DP & PT pulses are intact & regular bilaterally.  No significant edema or varicosities noted.  CFT and skin temperature is normal to both lower extremities.     Neurologic: Lower extremity sensation is intact to light touch.  No evidence of weakness or contracture in the lower extremities.  No evidence of neuropathy.     Musculoskeletal: Patient is ambulatory without assistive device or brace.  No gross ankle deformity noted.  No foot or ankle joint effusion is noted.     ASSESSMENT:    Left foot pain  Ingrown nail of great toe of left foot     Medical Decision Making/Plan:  Reviewed patient's chart in [facility]. The potential causes and nature of an ingrown toenail were discussed with the patient.  We reviewed the natural history/prognosis of the condition and potential risks if no treatment is provided.      Treatment options discussed included conservative management (oral antibiotics, soaking of foot, adequate width shoes)  as well as surgical management (partial or total nail removal).  The pros and cons of both forms of treatment were reviewed.      After thorough discussion and answering all questions, the patient elected to have the border removed.  He will soak the foot twice a day for 6 weeks and apply silvadene cream and a Band-Aid.  All questions were answered to patient satisfaction he will call further " questions or concerns.      Procedure: After verbal consent, the left big toe was anesthetized with 5cc's of 1% lidocaine plain. A tourniquet was applied to the toe. The medial border was then raised from the nail bed and then cut the length of the nail.  The offending nail border was then removed.  Three 30 second applications of phenol were applied to the nail bed and nail matrix.  The area was then flushed with copious amounts of alcohol.  Bacitracin was applied to the nail bed.  The tourniquet was removed.  Bandage was applied to the toe.  The patient tolerated the procedure and anesthesia well.      Patient risk factor: Patient is at low risk for infection.      Thalia Prado DPM, Podiatry/Foot and Ankle Surgery     Patient expressed no known problems or needs

## 2022-04-19 ENCOUNTER — TELEPHONE (OUTPATIENT)
Dept: SLEEP MEDICINE | Facility: CLINIC | Age: 52
End: 2022-04-19
Payer: COMMERCIAL

## 2022-04-19 NOTE — TELEPHONE ENCOUNTER
Reason for Call:  Other call back    Detailed comments: Pt would like to change his pressure in his cpap machine. Pt said he is snoring more. Pt was last seen 1/10/2022     Phone Number Patient can be reached at: Cell number on file:    Telephone Information:   Mobile [phone]       Best Time: anytime     Can we leave a detailed message on this number? YES    Call taken on 4/19/2022 at 11:16 AM by Tasneem Grier

## 2022-04-20 NOTE — TELEPHONE ENCOUNTER
Called pt and scheduled him for soonest available follow up. Put appointment on wait list and answered questions on virtual appointment.    Ziyad Koehler, Visit facilitator

## 2022-04-21 ENCOUNTER — VIRTUAL VISIT (OUTPATIENT)
Dept: SLEEP MEDICINE | Facility: CLINIC | Age: 52
End: 2022-04-21
Payer: COMMERCIAL

## 2022-04-21 ENCOUNTER — TELEPHONE (OUTPATIENT)
Dept: SLEEP MEDICINE | Facility: CLINIC | Age: 52
End: 2022-04-21
Payer: COMMERCIAL

## 2022-04-21 DIAGNOSIS — G47.33 OBSTRUCTIVE SLEEP APNEA: Primary | ICD-10-CM

## 2022-04-21 DIAGNOSIS — E66.9 OBESITY (BMI 30-39.9): ICD-10-CM

## 2022-04-21 PROCEDURE — 99213 OFFICE O/P EST LOW 20 MIN: CPT | Mod: 95 | Performed by: FAMILY MEDICINE

## 2022-04-21 NOTE — PROGRESS NOTES
"Neptali Ospina is a 52 year old male who is being evaluated via a billable telephone visit.       The patient has been notified of following:      \"This video visit will be conducted via a call between you and your physician/provider. We have found that certain health care needs can be provided without the need for an in-person physical exam.  This service lets us provide the care you need with a video conversation.  If a prescription is necessary we can send it directly to your pharmacy.  If lab work is needed we can place an order for that and you can then stop by our lab to have the test done at a later time.     Video visits are billed at different rates depending on your insurance coverage.  Please reach out to your insurance provider with any questions.     If during the course of the call the physician/provider feels a video visit is not appropriate, you will not be charged for this service.\"     Patient has given verbal consent for Video visit? Yes  How would you like to obtain your AVS? Mail a copy  If you are dropped from the video visit, the video invite should be resent to: Text to cell phone: -  Will anyone else be joining your video visit? No  If patient encounters technical issues they should call [phone]      Telephone-Visit Details     Type of service:  Video Visit     Start Time: 1020  End Time: 1040    Virtual visit for severe obstructive sleep apnea managed with CPAP with residual snoring.     Assessment / Plan:    1.)  Severe obstructive sleep apnea  - Appears very well controlled with excellent compliance on CPAP auto titrate 5-20 cm H2O  - I suspect that his new snoring is related to an unknown change to his lower pressure went from 7 cm H2O down to 5 cm H2O.  - We went through and updated his machine over the phone to a new lower pressure limit of 8 cm H2O (8-20 cm H2O)  - He will reach out if any residual snoring, otherwise plan for follow-up routine in 1-2 " years      SUBJECTIVE:  Neptali Ospina is a 52 year old male.    Pertinent PMHx of paroxysmal atrial fibrillation, morbid obesity, anxiety, DANIELLE.    Prior Sleep Testin2003 - [facility]G at [facility].  Weight 200 lbs, BMI 31.5.  AHI 65.5, alix SpO2 79%.  CPAP 9 incompletely effective.    1/10/2022 - Most recent visit with Dr. Jiang.  Treated with CPAP auto-titrate 7-20 cm H2O, pressure 95th%ile 14.1 cm H2O, AHI 2.1, compliance borderline.    Today - Presents to discuss pressure change due to residual snoring.    CPAP download reviewed from 3/21/2022 - 2022 on auto-titrate 5-20 cm H2O.  Used 30/30 days, average daily usage 9 hours 17 minutes.  Pressure median 11.4 cm H2O, 95th%ile 15.1 cm H2O.  Leak median 15.4 L/min, 95th%ile 39.4 L/min.  AHI 1.      Insomnia Severity Index    Difficulty falling asleep None    Difficulty staying asleep None    Problems waking up too early None    How SATISFIED/DISSATISFIED are you with your CURRENT sleep pattern? Very Satisfied    How NOTICEABLE to others do you think your sleep problem is in terms of impairing the quality of your life? Not at all noticeable    How WORRIED/DISTRESSED are you about your current sleep problem? Not at all Worried    To what extent do you consider your sleep problem to INTERFERE with your daily functioning (e.g. daytime fatigue, mood, ability to function at work/daily chores, concentration, memory, mood, etc.) CURRENTLY? Not at all Interfering    Total Score 0        Past medical history:    Patient Active Problem List    Diagnosis Date Noted     Paroxysmal ventricular tachycardia (H) 2021     Priority: Medium     Morbid obesity (H) 2020     Priority: Medium     CARDIOVASCULAR SCREENING; LDL GOAL LESS THAN 160 2013     Priority: Medium     Left thyroid nodule 02/10/2012     Priority: Medium     Had Bx  Had nuclear scan  Followed by Endocrinology        Anxiety 02/10/2012     Priority: Medium     Vitamin D deficiency  01/13/2012     Priority: Medium     DANIELLE on CPAP      Priority: Medium       10 point ROS of systems including Constitutional, Eyes, Respiratory, Cardiovascular, Gastroenterology, Genitourinary, Integumentary, Muscularskeletal, Psychiatric were all negative except for pertinent positives noted in my HPI.    Current Outpatient Medications   Medication Sig Dispense Refill     escitalopram (LEXAPRO) 10 MG tablet Take 1 tablet (10 mg) by mouth daily 90 tablet 3     fish oil-omega-3 fatty acids (OMEGA 3) 1000 MG capsule Take 1 capsule by mouth 2 times daily. Lavazo fish oil 180 capsule 1     fluticasone (FLONASE) 50 MCG/ACT nasal spray Spray 1-2 sprays into both nostrils daily (Patient taking differently: Spray 1-2 sprays into both nostrils daily as needed) 1 Package 1     lisinopril (ZESTRIL) 20 MG tablet Take 1 tablet (20 mg) by mouth daily 90 tablet 3     LORazepam (ATIVAN) 0.5 MG tablet Take 1 tablet (0.5 mg) by mouth every 6 hours as needed for anxiety 4 tablet 0     magnesium oxide 200 MG TABS Take 200 mg by mouth daily (Patient not taking: Reported on 4/12/2022)       Multiple Vitamin (MULTI-VITAMIN) per tablet Take 1 tablet by mouth daily. 90 tablet 3     silver sulfADIAZINE (SILVADENE) 1 % external cream Apply topically 2 times daily 25 g 1     simvastatin (ZOCOR) 20 MG tablet TAKE ONE TABLET BY MOUTH EVERY NIGHT AT BEDTIME 90 tablet 3       OBJECTIVE:  There were no vitals taken for this visit.    Physical Exam     ---  This note was written with the assistance of the Dragon voice-dictation technology software. The final document, although reviewed, may contain errors. For corrections, please contact the office.    Beck Li MD    Sleep Medicine  [facility]  ([phone])  [facility]  ([phone])    Time spent on the date of service:  20 minutes.

## 2022-12-08 DIAGNOSIS — F41.9 ANXIETY: ICD-10-CM

## 2022-12-12 RX ORDER — ESCITALOPRAM OXALATE 10 MG/1
10 TABLET ORAL DAILY
Qty: 90 TABLET | Refills: 0 | Status: SHIPPED | OUTPATIENT
Start: 2022-12-12 | End: 2023-01-24

## 2022-12-12 NOTE — TELEPHONE ENCOUNTER
Patient is out of medication. Pharmacy gave patient 3 days emergency supply to get him by.    Kenzie Santos, Maxine  [facility]  [address] [place] [address] [phone]

## 2022-12-12 NOTE — TELEPHONE ENCOUNTER
Medication is being filled for 1 time refill only due to:  Patient needs to be seen because due for an appt for a px.  . for further refills.  This is for lexapro.    Will forward to the station, please try to help the pt schedule a physical for further refills.  Thanks!

## 2022-12-15 DIAGNOSIS — E78.2 MIXED HYPERLIPIDEMIA: ICD-10-CM

## 2022-12-15 RX ORDER — SIMVASTATIN 20 MG
TABLET ORAL
Qty: 90 TABLET | Refills: 0 | Status: SHIPPED | OUTPATIENT
Start: 2022-12-15 | End: 2023-01-24

## 2022-12-15 NOTE — TELEPHONE ENCOUNTER
Next 5 appointments (look out 90 days)    Jan 24, 2023  7:30 AM  (Arrive by 7:10 AM)  Adult Preventative Visit with Padmini Thompson PA-C  [facility] ([facility] ) [address] [facility] [address] [phone]        Prescription approved per [facility] Refill Protocol.  Deborah Hicks RN

## 2022-12-15 NOTE — TELEPHONE ENCOUNTER
Pt called in requesting refill.  Needs fill soon ASAP, leaving for vacation on 12/17/22.    Bert'd up and routed to refill pool.    Maribel Lacey RN, BSN  [facility]

## 2023-01-08 ENCOUNTER — E-VISIT (OUTPATIENT)
Dept: FAMILY MEDICINE | Facility: CLINIC | Age: 53
End: 2023-01-08
Payer: COMMERCIAL

## 2023-01-08 DIAGNOSIS — F40.243 ANXIETY WITH FLYING: ICD-10-CM

## 2023-01-08 PROCEDURE — 99421 OL DIG E/M SVC 5-10 MIN: CPT | Performed by: PHYSICIAN ASSISTANT

## 2023-01-08 ASSESSMENT — ANXIETY QUESTIONNAIRES
1. FEELING NERVOUS, ANXIOUS, OR ON EDGE: SEVERAL DAYS
GAD7 TOTAL SCORE: 7
GAD7 TOTAL SCORE: 7
2. NOT BEING ABLE TO STOP OR CONTROL WORRYING: SEVERAL DAYS
6. BECOMING EASILY ANNOYED OR IRRITABLE: SEVERAL DAYS
8. IF YOU CHECKED OFF ANY PROBLEMS, HOW DIFFICULT HAVE THESE MADE IT FOR YOU TO DO YOUR WORK, TAKE CARE OF THINGS AT HOME, OR GET ALONG WITH OTHER PEOPLE?: SOMEWHAT DIFFICULT
GAD7 TOTAL SCORE: 7
7. FEELING AFRAID AS IF SOMETHING AWFUL MIGHT HAPPEN: SEVERAL DAYS
3. WORRYING TOO MUCH ABOUT DIFFERENT THINGS: SEVERAL DAYS
7. FEELING AFRAID AS IF SOMETHING AWFUL MIGHT HAPPEN: SEVERAL DAYS
5. BEING SO RESTLESS THAT IT IS HARD TO SIT STILL: SEVERAL DAYS
4. TROUBLE RELAXING: SEVERAL DAYS

## 2023-01-09 RX ORDER — LORAZEPAM 0.5 MG/1
0.5 TABLET ORAL EVERY 6 HOURS PRN
Qty: 4 TABLET | Refills: 0 | Status: SHIPPED | OUTPATIENT
Start: 2023-01-09 | End: 2023-01-24

## 2023-01-09 ASSESSMENT — ANXIETY QUESTIONNAIRES: GAD7 TOTAL SCORE: 7

## 2023-01-24 ENCOUNTER — OFFICE VISIT (OUTPATIENT)
Dept: FAMILY MEDICINE | Facility: CLINIC | Age: 53
End: 2023-01-24
Payer: COMMERCIAL

## 2023-01-24 VITALS
RESPIRATION RATE: 16 BRPM | OXYGEN SATURATION: 97 % | WEIGHT: 242.1 LBS | BODY MASS INDEX: 35.86 KG/M2 | TEMPERATURE: 98.1 F | HEIGHT: 69 IN | DIASTOLIC BLOOD PRESSURE: 88 MMHG | SYSTOLIC BLOOD PRESSURE: 148 MMHG | HEART RATE: 92 BPM

## 2023-01-24 DIAGNOSIS — Z00.00 ROUTINE GENERAL MEDICAL EXAMINATION AT A HEALTH CARE FACILITY: Primary | ICD-10-CM

## 2023-01-24 DIAGNOSIS — F40.243 ANXIETY WITH FLYING: ICD-10-CM

## 2023-01-24 DIAGNOSIS — I10 ESSENTIAL HYPERTENSION: ICD-10-CM

## 2023-01-24 DIAGNOSIS — I47.29 PAROXYSMAL VENTRICULAR TACHYCARDIA (H): ICD-10-CM

## 2023-01-24 DIAGNOSIS — K76.0 HEPATIC STEATOSIS: Primary | ICD-10-CM

## 2023-01-24 DIAGNOSIS — E66.01 MORBID OBESITY (H): ICD-10-CM

## 2023-01-24 DIAGNOSIS — K76.0 HEPATIC STEATOSIS: ICD-10-CM

## 2023-01-24 DIAGNOSIS — F41.9 ANXIETY: ICD-10-CM

## 2023-01-24 DIAGNOSIS — E78.2 MIXED HYPERLIPIDEMIA: ICD-10-CM

## 2023-01-24 LAB
ALBUMIN SERPL BCG-MCNC: 4.5 G/DL (ref 3.5–5.2)
ALP SERPL-CCNC: 57 U/L (ref 40–129)
ALT SERPL W P-5'-P-CCNC: 68 U/L (ref 10–50)
ALT SERPL W P-5'-P-CCNC: 68 U/L (ref 10–50)
ANION GAP SERPL CALCULATED.3IONS-SCNC: 15 MMOL/L (ref 7–15)
AST SERPL W P-5'-P-CCNC: 40 U/L (ref 10–50)
BILIRUB DIRECT SERPL-MCNC: <0.2 MG/DL (ref 0–0.3)
BILIRUB SERPL-MCNC: 0.6 MG/DL
BUN SERPL-MCNC: 16.2 MG/DL (ref 6–20)
CALCIUM SERPL-MCNC: 9.5 MG/DL (ref 8.6–10)
CHLORIDE SERPL-SCNC: 102 MMOL/L (ref 98–107)
CHOLEST SERPL-MCNC: 183 MG/DL
CREAT SERPL-MCNC: 0.98 MG/DL (ref 0.67–1.17)
DEPRECATED HCO3 PLAS-SCNC: 26 MMOL/L (ref 22–29)
GFR SERPL CREATININE-BSD FRML MDRD: >90 ML/MIN/1.73M2
GLUCOSE SERPL-MCNC: 105 MG/DL (ref 70–99)
HBA1C MFR BLD: 5.4 % (ref 0–5.6)
HDLC SERPL-MCNC: 35 MG/DL
LDLC SERPL CALC-MCNC: 96 MG/DL
NONHDLC SERPL-MCNC: 148 MG/DL
POTASSIUM SERPL-SCNC: 4.2 MMOL/L (ref 3.4–5.3)
PROT SERPL-MCNC: 7.4 G/DL (ref 6.4–8.3)
SODIUM SERPL-SCNC: 143 MMOL/L (ref 136–145)
TRIGL SERPL-MCNC: 262 MG/DL

## 2023-01-24 PROCEDURE — 80061 LIPID PANEL: CPT | Performed by: PHYSICIAN ASSISTANT

## 2023-01-24 PROCEDURE — 99214 OFFICE O/P EST MOD 30 MIN: CPT | Mod: 25 | Performed by: PHYSICIAN ASSISTANT

## 2023-01-24 PROCEDURE — 36415 COLL VENOUS BLD VENIPUNCTURE: CPT | Performed by: PHYSICIAN ASSISTANT

## 2023-01-24 PROCEDURE — 80053 COMPREHEN METABOLIC PANEL: CPT | Performed by: PHYSICIAN ASSISTANT

## 2023-01-24 PROCEDURE — 99396 PREV VISIT EST AGE 40-64: CPT | Performed by: PHYSICIAN ASSISTANT

## 2023-01-24 PROCEDURE — 83036 HEMOGLOBIN GLYCOSYLATED A1C: CPT | Performed by: PHYSICIAN ASSISTANT

## 2023-01-24 PROCEDURE — 82248 BILIRUBIN DIRECT: CPT | Performed by: PHYSICIAN ASSISTANT

## 2023-01-24 RX ORDER — LISINOPRIL 20 MG/1
20 TABLET ORAL DAILY
Qty: 90 TABLET | Refills: 3 | Status: SHIPPED | OUTPATIENT
Start: 2023-01-24 | End: 2024-01-05

## 2023-01-24 RX ORDER — ESCITALOPRAM OXALATE 10 MG/1
10 TABLET ORAL DAILY
Qty: 90 TABLET | Refills: 3 | Status: SHIPPED | OUTPATIENT
Start: 2023-01-24 | End: 2024-01-05

## 2023-01-24 RX ORDER — LORAZEPAM 0.5 MG/1
1-2 TABLET ORAL EVERY 6 HOURS PRN
Qty: 8 TABLET | Refills: 0 | Status: SHIPPED | OUTPATIENT
Start: 2023-01-24 | End: 2024-02-25

## 2023-01-24 RX ORDER — SIMVASTATIN 20 MG
TABLET ORAL
Qty: 90 TABLET | Refills: 3 | Status: SHIPPED | OUTPATIENT
Start: 2023-01-24 | End: 2024-01-05

## 2023-01-24 ASSESSMENT — ENCOUNTER SYMPTOMS
ENDOCRINE NEGATIVE: 1
ALLERGIC/IMMUNOLOGIC NEGATIVE: 1
CONSTITUTIONAL NEGATIVE: 1
MUSCULOSKELETAL NEGATIVE: 1
EYES NEGATIVE: 1
CARDIOVASCULAR NEGATIVE: 1
HEMATOLOGIC/LYMPHATIC NEGATIVE: 1
RESPIRATORY NEGATIVE: 1
PSYCHIATRIC NEGATIVE: 1
NEUROLOGICAL NEGATIVE: 1
GASTROINTESTINAL NEGATIVE: 1

## 2023-01-24 ASSESSMENT — PAIN SCALES - GENERAL: PAINLEVEL: NO PAIN (0)

## 2023-01-24 NOTE — PATIENT INSTRUCTIONS
MEDICATION STARTED AT THIS APPOINTMENT     We are starting a GLP-1 (Glucagon-like Peptide-1) medication called Wegovy. One of the ways it works is by slowing down the rate that food leaves your stomach. You feel galindo and will eat less. It also helps regulate hormones that can help improve your blood sugars.     If you are a patient that checks blood sugars, continue to check as instructed by your doctor. Low blood sugars are rare but can happen if patients are on insulin or other oral agents. If you notice consistent low sugars or high sugars, your medication may need to be adjusted after your appointment. If this is the case, please call and provide your blood sugar record from the last 3-4 days.      Dosing for this medication:   Week 1 through week 4 : 0.25 mg once weekly.  Week 5 through week 8: 0.5 mg once weekly.  Week 9 through week 12: 1 mg once weekly.     Side effects of GLP- Medications include: The most common side effects are all GI related and consist of: nausea, constipation, diarrhea, burping, or gassiness. Patients are advised to eat slowly and less, and nausea typically passes if people can stick it out.      The risk of pancreatitis (inflammation of the pancreas) has been associated with this type of medication, but is very rare. If you have had pancreatitis in the past, this medication may not be for you. Please let us know about any past history of pancreas problems.    Symptoms of pancreatitis include: Pain in your upper stomach area which may travel to your back and be worse after eating. Your stomach area may be tender to the touch.  You may have vomiting or nausea and/or have a fever. If you should develop any of these symptoms, stop the medication and let us know.        There is a small chance you may have some low blood sugar after taking the medication.   The signs of low blood sugar are:  Weakness  Shaky   Hungry  Sweating  Confusion      See below for ways to treat low blood  sugar without adding in lots of extra calories.        Treating Low Blood Sugar     If you have symptoms of low blood sugar (sweating, shaking, dizzy, confused) eat 15 grams of carbs and wait 15 minutes:     Glucose Tabs are best for sugars under 70 -  Dex4 or BD Glucose tablets are good, you will need to take 3-4 of these to equal 15 grams.      One small box of raisins  4 oz fruit juice box or   cup fruit juice  1 small apple  1 small banana    cup canned fruit in water    English muffin or a slice of bread with jelly   1 low fat frozen waffle with sugar-free syrup    cup cottage cheese with   cup frozen or fresh blueberries  1 cup skim or low-fat milk    cup whole grain cereal  4-6 crackers such as Triscuits     This medication is usually not covered by insurance and can be quite expensive. Sometimes a prior authorization is required, which may take up to 1-2 weeks for an insurance company to make a decision if they will cover the medication. Please be patient, you will be notified after a decision has been made.          Preventive Health Recommendations  Male Ages 50 - 64    Yearly exam:             See your health care provider every year in order to  o   Review health changes.   o   Discuss preventive care.    o   Review your medicines if your doctor has prescribed any.   Have a cholesterol test every 5 years, or more frequently if you are at risk for high cholesterol/heart disease.   Have a diabetes test (fasting glucose) every three years. If you are at risk for diabetes, you should have this test more often.   Have a colonoscopy at age 50, or have a yearly FIT test (stool test). These exams will check for colon cancer.    Talk with your health care provider about whether or not a prostate cancer screening test (PSA) is right for you.  You should be tested each year for STDs (sexually transmitted diseases), if you re at risk.     Shots: Get a flu shot each year. Get a tetanus shot every 10 years.      Nutrition:  Eat at least 5 servings of fruits and vegetables daily.   Eat whole-grain bread, whole-wheat pasta and brown rice instead of white grains and rice.   Get adequate Calcium and Vitamin D.     Lifestyle  Exercise for at least 150 minutes a week (30 minutes a day, 5 days a week). This will help you control your weight and prevent disease.   Limit alcohol to one drink per day.   No smoking.   Wear sunscreen to prevent skin cancer.   See your dentist every six months for an exam and cleaning.   See your eye doctor every 1 to 2 years.

## 2023-01-24 NOTE — PROGRESS NOTES
SUBJECTIVE:   CC: Neptali is an 52 year old who presents for preventative health visit.   Patient has been advised of split billing requirements and indicates understanding: Yes  Healthy Habits:    Getting at least 3 servings of Calcium per day:  Yes    Bi-annual eye exam:  Yes    Dental care twice a year:  Yes    Sleep apnea or symptoms of sleep apnea:  Sleep apnea    Diet:  Regular (no restrictions)    Frequency of exercise:  6-7 days/week    Duration of exercise:  Greater than 60 minutes    Taking medications regularly:  Yes    Barriers to taking medications:  None    Medication side effects:  None    PHQ-2 Total Score:    Additional concerns today:  No    HTN: taking lisinopril 20 mg daily. Doing well, no side effects or concerns. He checks blood pressure at home regularly and it is well controlled in 120s/70s. No chest pain, shortness of breath, swelling in the extremities, palpitations, dizziness, headaches, blurred vision. BP is usually higher in clinic due to anxiety.    History of sustained monomorphic VT, s/p ablation at [facility] (12/2021). He saw cardiology on 1/21/22 and does not need further follow-up unless concerns. He feels really good. Denies palpitations, dizziness, lightheadedness, chest pain, shortness of breath.    Anxiety Follow-Up    How are you doing with your anxiety since your last visit? stable    Are you having other symptoms that might be associated with anxiety? No    Have you had a significant life event? No     Are you feeling depressed? No    Do you have any concerns with your use of alcohol or other drugs? No      He feels like his anxiety is stable and would like to stay on same dose of lexparo. He does have flight anxiety and is traveling internationally soon. He would like some ativan for the flight. He has used ativan for flights in the past and it works okay for him but still has some anxiety. He does not mix with alcohol or drive while taking the medication.  WALDO-7 SCORE  11/2/2020 2/12/2022 1/8/2023   Total Score - - -   Total Score - 0 (minimal anxiety) 7 (mild anxiety)   Total Score 6 0 7      Concerns with weight. He exercises regularly but has had a hard time losing weight.  He feels like diet could use improvement but knows what changes he should be making, just has a hard time doing it. He is interested in weight loss medication. No history of pancreatitis. No personal or family history of thyroid problems. No history of diabetes.    Wt Readings from Last 3 Encounters:   01/24/23 109.8 kg (242 lb 1.6 oz)   04/12/22 108.9 kg (240 lb)   01/28/22 103.4 kg (228 lb)       Hyperlipidemia Follow-Up      Are you regularly taking any medication or supplement to lower your cholesterol?   Yes- simvastatin    Are you having muscle aches or other side effects that you think could be caused by your cholesterol lowering medication?  No      Social History     Tobacco Use     Smoking status: Former     Smokeless tobacco: Never     Tobacco comments:     Quit smoking 2007   Vaping Use     Vaping Use: Never used   Substance Use Topics     Alcohol use: Yes     Alcohol/week: 0.0 standard drinks     Comment: very occ.     Drug use: No     WALDO-7 SCORE 11/2/2020 2/12/2022 1/8/2023   Total Score - - -   Total Score - 0 (minimal anxiety) 7 (mild anxiety)   Total Score 6 0 7     PHQ 11/2/2020   PHQ-9 Total Score 0   Q9: Thoughts of better off dead/self-harm past 2 weeks Not at all       Today's PHQ-2 Score:   PHQ-2 ( 1999 [employer]) 1/24/2023   Q1: Little interest or pleasure in doing things 0   Q2: Feeling down, depressed or hopeless 0   PHQ-2 Score 0   PHQ-2 Total Score (12-17 Years)- Positive if 3 or more points; Administer PHQ-A if positive -   Q1: Little interest or pleasure in doing things -   Q2: Feeling down, depressed or hopeless -   PHQ-2 Score -           Social History     Tobacco Use     Smoking status: Former     Smokeless tobacco: Never     Tobacco comments:     Quit smoking 2007   Substance  Use Topics     Alcohol use: Yes     Alcohol/week: 0.0 standard drinks     Comment: very occ.         Alcohol Use 11/16/2021   Prescreen: >3 drinks/day or >7 drinks/week? No   Prescreen: >3 drinks/day or >7 drinks/week? -       Last PSA:   Prostate Specific Antigen Screen   Date Value Ref Range Status   11/16/2021 0.56 0.00 - 4.00 ug/L Final       Reviewed orders with patient. Reviewed health maintenance and updated orders accordingly - Yes  Lab work is in process  Labs reviewed in EPIC  BP Readings from Last 3 Encounters:   01/24/23 (!) 148/88   04/12/22 (!) 160/74   01/28/22 130/80    Wt Readings from Last 3 Encounters:   01/24/23 109.8 kg (242 lb 1.6 oz)   04/12/22 108.9 kg (240 lb)   01/28/22 103.4 kg (228 lb)                  Patient Active Problem List   Diagnosis     DANIELLE on CPAP     Vitamin D deficiency     Left thyroid nodule     Anxiety     CARDIOVASCULAR SCREENING; LDL GOAL LESS THAN 160     Morbid obesity (H)     Paroxysmal ventricular tachycardia     Past Surgical History:   Procedure Laterality Date     COLONOSCOPY N/A 1/27/2022    Procedure: COLONOSCOPY, WITH POLYPECTOMY using forceps;  Surgeon: Po Melendrez MD;  Location:  GI     CV HEART CATHETERIZATION WITH POSSIBLE INTERVENTION N/A 12/10/2021    Procedure: Heart Catheterization with Possible Intervention;  Surgeon: Quyen Lomas MD;  Location:  HEART CARDIAC CATH LAB     EP ABLATION VT/PVC N/A 12/14/2021    Procedure: EP Ablation VT;  Surgeon: Zhen Saavedra MD;  Location:  HEART CARDIAC CATH LAB     PE TUBES       TONSILLECTOMY & ADENOIDECTOMY       VASECTOMY         Social History     Tobacco Use     Smoking status: Former     Smokeless tobacco: Never     Tobacco comments:     Quit smoking 2007   Substance Use Topics     Alcohol use: Yes     Alcohol/week: 0.0 standard drinks     Comment: very occ.     Family History   Problem Relation Age of Onset     C.A.D. Mother      Cancer Mother         brain tumor     Circulatory Father          sleep apnea,   in sleep 58     Sleep Apnea Father      Gynecology Sister         x2     Colon Cancer No family hx of          Current Outpatient Medications   Medication Sig Dispense Refill     escitalopram (LEXAPRO) 10 MG tablet Take 1 tablet (10 mg) by mouth daily 90 tablet 3     fish oil-omega-3 fatty acids (OMEGA 3) 1000 MG capsule Take 1 capsule by mouth 2 times daily. Lavazo fish oil 180 capsule 1     fluticasone (FLONASE) 50 MCG/ACT nasal spray Spray 1-2 sprays into both nostrils daily (Patient taking differently: Spray 1-2 sprays into both nostrils daily as needed) 1 Package 1     lisinopril (ZESTRIL) 20 MG tablet Take 1 tablet (20 mg) by mouth daily 90 tablet 3     LORazepam (ATIVAN) 0.5 MG tablet Take 2-4 tablets (1-2 mg) by mouth every 6 hours as needed for anxiety 8 tablet 0     Multiple Vitamin (MULTI-VITAMIN) per tablet Take 1 tablet by mouth daily. 90 tablet 3     Semaglutide-Weight Management 0.25 MG/0.5ML SOAJ Inject 0.25 mg Subcutaneous every 7 days Inject 0.25 mg subcutaneous once weekly for 4 weeks 2 mL 0     simvastatin (ZOCOR) 20 MG tablet TAKE ONE TABLET BY MOUTH EVERY NIGHT AT BEDTIME 90 tablet 3     magnesium oxide 200 MG TABS Take 200 mg by mouth daily       No Known Allergies  Recent Labs   Lab Test 23  0836 22  0924 21  1304 12/10/21  1205 21  1321 21  0927 21  0927 20  1628 16  0821   A1C 5.4  --   --   --   --   --  5.5 5.4 5.5   LDL  --   --   --   --   --   --  96 96 77   HDL  --   --   --   --   --   --  34* 37* 40   TRIG  --   --   --   --   --   --  267* 235* 216*   ALT  --   --  34  --  67  --  72* 84* 57   CR  --  0.93 0.82   < > 0.86   < > 0.94 0.87 0.92   GFRESTIMATED  --  >90 >90   < > >90   < > >90 >90 89   GFRESTBLACK  --   --   --   --   --   --   --  >90 >90  African American GFR Calc     POTASSIUM  --  4.2 3.9   < > 3.8   < > 4.6 4.1 3.5   TSH  --   --   --   --  2.38  --  2.49 2.70 2.34    < > = values in this  "interval not displayed.        Reviewed and updated as needed this visit by clinical staff   Tobacco  Allergies  Meds  Problems  Med Hx  Surg Hx  Fam Hx          Reviewed and updated as needed this visit by Provider   Tobacco  Allergies  Meds  Problems  Med Hx  Surg Hx  Fam Hx         Past Medical History:   Diagnosis Date     High triglycerides      HSV-1 infection 3/13/2018     DARLING (nonalcoholic steatohepatitis)     12/10      DANIELLE on CPAP      Palpitations      Paroxysmal ventricular tachycardia      Strabismus      Thyroid nodule 02/2012    biopsy thyroglossal ductal cyst.  see End[facility]     Vitamin D deficiency      White coat syndrome without diagnosis of hypertension 11/22/2013    Home readings of 106/65       Past Surgical History:   Procedure Laterality Date     COLONOSCOPY N/A 1/27/2022    Procedure: COLONOSCOPY, WITH POLYPECTOMY using forceps;  Surgeon: Po Melendrez MD;  Location:  GI     CV HEART CATHETERIZATION WITH POSSIBLE INTERVENTION N/A 12/10/2021    Procedure: Heart Catheterization with Possible Intervention;  Surgeon: Quyen Lomas MD;  Location:  HEART CARDIAC CATH LAB     EP ABLATION VT/PVC N/A 12/14/2021    Procedure: EP Ablation VT;  Surgeon: Zhen Saavedra MD;  Location:  HEART CARDIAC CATH LAB     PE TUBES       TONSILLECTOMY & ADENOIDECTOMY       VASECTOMY         Review of Systems   Constitutional: Negative.    HENT: Negative.    Eyes: Negative.    Respiratory: Negative.    Cardiovascular: Negative.    Gastrointestinal: Negative.    Endocrine: Negative.    Genitourinary: Negative.    Musculoskeletal: Negative.    Skin: Negative.    Allergic/Immunologic: Negative.    Neurological: Negative.    Hematological: Negative.    Psychiatric/Behavioral: Negative.        OBJECTIVE:   BP (!) 148/88   Pulse 92   Temp 98.1  F (36.7  C) (Oral)   Resp 16   Ht 1.759 m (5' 9.25\")   Wt 109.8 kg (242 lb 1.6 oz)   SpO2 97%   BMI 35.49 kg/m      Physical Exam  GENERAL: " healthy, alert and no distress  EYES: Eyes grossly normal to inspection, PERRL and conjunctivae and sclerae normal  HENT: ear canals and TM's normal, nose and mouth without ulcers or lesions  NECK: no adenopathy, no asymmetry, masses, or scars and thyroid normal to palpation  RESP: lungs clear to auscultation - no rales, rhonchi or wheezes  CV: regular rate and rhythm, normal S1 S2, no S3 or S4, no murmur, click or rub, no peripheral edema and peripheral pulses strong  ABDOMEN: soft, nontender, no hepatosplenomegaly, no masses and bowel sounds normal  MS: no gross musculoskeletal defects noted, no edema  SKIN: no suspicious lesions or rashes, several seborrheic keratosis on abdomen  NEURO: Normal strength and tone, mentation intact and speech normal  PSYCH: mentation appears normal, affect normal/bright    Diagnostic Test Results:  Labs reviewed in Epic    ASSESSMENT/PLAN:   Routine general medical examination at a health care facility  Reviewed personal and family history. Reviewed age appropriate screenings. Reviewed healthy BP and BMI ranges. Counseled on lifestyle modifications for optimal mental and physical health.  Discussed age-appropriate health maintenance. Recommended any needed vaccinations. Continue to focus on well balanced diet and exercise.   - Basic metabolic panel  (Ca, Cl, CO2, Creat, Gluc, K, Na, BUN); Future  - Hemoglobin A1c; Future  - Basic metabolic panel  (Ca, Cl, CO2, Creat, Gluc, K, Na, BUN)  - Hemoglobin A1c    Mixed hyperlipidemia  Chronic, due for labs. He is fasting today.  - simvastatin (ZOCOR) 20 MG tablet; TAKE ONE TABLET BY MOUTH EVERY NIGHT AT BEDTIME  - Lipid panel reflex to direct LDL Non-fasting; Future  - ALT; Future  - Lipid panel reflex to direct LDL Non-fasting  - ALT    Anxiety  Chronic, stable overall. WALDO score is up today but he feels like anxiety is good. Continue present management and follow-up if concerns.  - escitalopram (LEXAPRO) 10 MG tablet; Take 1 tablet (10  "mg) by mouth daily    Morbid obesity (H)  Discussed options, will try semaglutide. Discussed r/b/se. Recommend virtual visit to follow-up in about 3 weeks after starting the medication.  - Semaglutide-Weight Management 0.25 MG/0.5ML SOAJ; Inject 0.25 mg Subcutaneous every 7 days Inject 0.25 mg subcutaneous once weekly for 4 weeks    Essential hypertension  Taking lisinopril 20 mg daily. Doing well, no side effects or concerns. He checks blood pressure at home regularly and it is well controlled in 120s/70s. Asymptomatic. BP is usually higher in clinic due to anxiety. Will continue to monitor.  - lisinopril (ZESTRIL) 20 MG tablet; Take 1 tablet (20 mg) by mouth daily    Anxiety with flying  He does have flight anxiety and is traveling internationally soon. He would like some ativan for the flight. He has used ativan for flights in the past and it works okay for him but still has some anxiety. He does not mix with alcohol or drive while taking the medication.  - LORazepam (ATIVAN) 0.5 MG tablet; Take 2-4 tablets (1-2 mg) by mouth every 6 hours as needed for anxiety    Paroxysmal ventricular tachycardia  History of sustained monomorphic VT, s/p ablation at [facility] (12/2021). He saw cardiology on 1/21/22 and does not need further follow-up unless concerns. He feels really good. Denies palpitations, dizziness, lightheadedness, chest pain, shortness of breath.       COUNSELING:   Reviewed preventive health counseling, as reflected in patient instructions      BMI:   Estimated body mass index is 35.49 kg/m  as calculated from the following:    Height as of this encounter: 1.759 m (5' 9.25\").    Weight as of this encounter: 109.8 kg (242 lb 1.6 oz).   Weight management plan: Discussed healthy diet and exercise guidelines      He reports that he has quit smoking. He has never used smokeless tobacco.            Padmini Thompson PA-C  [facility]  "

## 2023-02-01 ENCOUNTER — MYC MEDICAL ADVICE (OUTPATIENT)
Dept: FAMILY MEDICINE | Facility: CLINIC | Age: 53
End: 2023-02-01
Payer: COMMERCIAL

## 2023-02-08 ENCOUNTER — VIRTUAL VISIT (OUTPATIENT)
Dept: FAMILY MEDICINE | Facility: CLINIC | Age: 53
End: 2023-02-08
Payer: COMMERCIAL

## 2023-02-08 DIAGNOSIS — I47.29 PAROXYSMAL VENTRICULAR TACHYCARDIA (H): ICD-10-CM

## 2023-02-08 DIAGNOSIS — E66.01 MORBID OBESITY (H): Primary | ICD-10-CM

## 2023-02-08 PROCEDURE — 99213 OFFICE O/P EST LOW 20 MIN: CPT | Mod: VID | Performed by: PHYSICIAN ASSISTANT

## 2023-02-08 RX ORDER — SEMAGLUTIDE 0.5 MG/.5ML
0.5 INJECTION, SOLUTION SUBCUTANEOUS WEEKLY
Qty: 2 ML | Refills: 0 | Status: SHIPPED | OUTPATIENT
Start: 2023-02-08 | End: 2023-04-24

## 2023-02-08 RX ORDER — SEMAGLUTIDE 1 MG/.5ML
1 INJECTION, SOLUTION SUBCUTANEOUS WEEKLY
Qty: 2 ML | Refills: 0 | Status: SHIPPED | OUTPATIENT
Start: 2023-02-08 | End: 2023-04-24

## 2023-02-08 NOTE — PROGRESS NOTES
Neptali is a 52 year old who is being evaluated via a billable video visit.      How would you like to obtain your AVS? MyChart  If the video visit is dropped, the invitation should be resent by: Text to cell phone: [phone]  Will anyone else be joining your video visit? No          Assessment & Plan     Morbid obesity (H)  He is doing well with wegovy so far. He will finish the 0.25 mg dose (has 2 doses left), and then will increase to 0.5 mg dose for 4 weeks, then increase to 1 mg dose for 4 weeks. Follow-up in 2 months for med check to see how he is doing. Can increase dose further at that time if tolerating medication and doing well.  - Semaglutide-Weight Management (WEGOVY) 0.5 MG/0.5ML SOAJ; Inject 0.5 mg Subcutaneous once a week for 4 weeks, THEN increase to 1 mg once a week for 4 weeks  - Semaglutide-Weight Management (WEGOVY) 1 MG/0.5ML SOAJ; Inject 1 mg Subcutaneous once a week for 4 weeks. Start AFTER completing 0.5 mg once weekly for 4 weeks.    Return in about 2 months (around 4/8/2023) for Med Check.    Padmini Thompson PA-C  [facility]   Neptali is a 52 year old, presenting for the following health issues:  Recheck Medication      History of Present Illness       Reason for visit:  Renew Wugovy perscription    He eats 0-1 servings of fruits and vegetables daily.He consumes 2 sweetened beverage(s) daily.He exercises with enough effort to increase his heart rate 60 or more minutes per day.  He exercises with enough effort to increase his heart rate 6 days per week.   He is taking medications regularly.       Medication Followup of Wegovy    Taking Medication as prescribed: yes    Side Effects:  Nausea    Medication Helping Symptoms:  not applicable    Started Wegovy almost 3 weeks ago. He has completed 2 doses so far and will be doing his 3rd dose tomorrow. After the first dose, he had mild headache and nausea but did not have any side effects after 2nd dose.      He does feel like his appetite is less and that he feels full faster. He has lost 4-5 pounds over the past few weeks. He is also motivated to make lifestyle changes and has been working on healthy diet and exercise.    Review of Systems   Constitutional, HEENT, cardiovascular, pulmonary, gi and gu systems are negative, except as otherwise noted.      Objective    Vitals - Patient Reported  Weight (Patient Reported): 107 kg (236 lb)      Vitals:  No vitals were obtained today due to virtual visit.    Physical Exam   GENERAL: Healthy, alert and no distress  EYES: Eyes grossly normal to inspection.  No discharge or erythema, or obvious scleral/conjunctival abnormalities.  RESP: No audible wheeze, cough, or visible cyanosis.  No visible retractions or increased work of breathing.    SKIN: Visible skin clear. No significant rash, abnormal pigmentation or lesions.  NEURO: Cranial nerves grossly intact.  Mentation and speech appropriate for age.  PSYCH: Mentation appears normal, affect normal/bright, judgement and insight intact, normal speech and appearance well-groomed.      Video-Visit Details    Type of service:  Video Visit     Originating Location (pt. Location): Home    Distant Location (provider location):  On-site  Platform used for Video Visit: [employer]

## 2023-02-20 ENCOUNTER — OFFICE VISIT (OUTPATIENT)
Dept: FAMILY MEDICINE | Facility: CLINIC | Age: 53
End: 2023-02-20
Payer: COMMERCIAL

## 2023-02-20 DIAGNOSIS — D18.01 CHERRY ANGIOMA: ICD-10-CM

## 2023-02-20 DIAGNOSIS — L82.1 SEBORRHEIC KERATOSIS: Primary | ICD-10-CM

## 2023-02-20 DIAGNOSIS — Z86.018 HISTORY OF DYSPLASTIC NEVUS: ICD-10-CM

## 2023-02-20 DIAGNOSIS — D49.2 NEOPLASM OF UNSPECIFIED BEHAVIOR OF BONE, SOFT TISSUE, AND SKIN: ICD-10-CM

## 2023-02-20 DIAGNOSIS — L81.4 SOLAR LENTIGO: ICD-10-CM

## 2023-02-20 DIAGNOSIS — D22.9 MULTIPLE BENIGN NEVI: ICD-10-CM

## 2023-02-20 DIAGNOSIS — L91.8 SKIN TAG: ICD-10-CM

## 2023-02-20 PROCEDURE — 88305 TISSUE EXAM BY PATHOLOGIST: CPT | Performed by: PATHOLOGY

## 2023-02-20 PROCEDURE — 99203 OFFICE O/P NEW LOW 30 MIN: CPT | Mod: 25 | Performed by: PHYSICIAN ASSISTANT

## 2023-02-20 PROCEDURE — 11102 TANGNTL BX SKIN SINGLE LES: CPT | Performed by: PHYSICIAN ASSISTANT

## 2023-02-20 PROCEDURE — 11200 RMVL SKIN TAGS UP TO&INC 15: CPT | Mod: XS | Performed by: PHYSICIAN ASSISTANT

## 2023-02-20 ASSESSMENT — PAIN SCALES - GENERAL: PAINLEVEL: NO PAIN (0)

## 2023-02-20 NOTE — LETTER
2/20/2023         RE: Neptali Ospina  [address] [facility] [address]        Dear Colleague,    Thank you for referring your patient, Neptali Ospina, to the [facility]. Please see a copy of my visit note below.    [facility] Dermatology Note  Encounter Date: Feb 20, 2023  Office Visit     Dermatology Problem List:  # NUB - mid chest, s/p shave biopsy 2/20/2023  1. DN, moderate atypia, right lower back - s/p shave biopsy 9/23/2021  ____________________________________________    Assessment & Plan:    # Neoplasm of unspecified behavior of the skin (D49.2) on the mid chest. The differential diagnosis includes DN vs MM vs other   - Shave biopsy performed today (see procedure note(s) below).    # Skin tags, upper back x1, left eyelid x3, left under eye x1, right eyelid x3   - Cryotherapy performed today (see procedure note(s) below).     # Benign lesions: Multiple benign nevi, solar lentigos, seborrheic keratoses, cherry angiomas. Explained to patient benign nature of lesion. No treatment is necessary at this time unless the lesion changes or becomes symptomatic.   - ABCDs of melanoma were discussed and self skin checks were advised.  - Sun precaution was advised including the use of sun screens of SPF 30 or higher, sun protective clothing, and avoidance of tanning beds.     # Hx dysplastic nevus - back  - continue annual skin exams  - edu on increased melanoma risk    Procedures Performed:   - Shave biopsy procedure note, location(s): see above. After discussion of benefits and risks including but not limited to bleeding, infection, scar, incomplete removal, recurrence, and non-diagnostic biopsy, written consent and photographs were obtained. The area was cleaned with isopropyl alcohol. 0.5mL of 1% lidocaine with epinephrine was injected to obtain adequate anesthesia of lesion(s). Shave biopsy at site(s) performed. Hemostasis was achieved with aluminium  chloride. Petrolatum ointment and a sterile dressing were applied. The patient tolerated the procedure and no complications were noted. The patient was provided with verbal and written post care instructions.   - Cryotherapy procedure note, location(s): see above. After verbal consent and discussion of risks and benefits including, but not limited to, dyspigmentation/scar, blister, and pain, 8 lesion(s) was(were) treated with 1-2 mm freeze border for 1-2 cycles with liquid nitrogen. Post cryotherapy instructions were provided.    Follow-up: pending path results otherwise 1-year for a FBSC.    Staff and Scribe:     Scribe Disclosure:  I, Timur Solomon, am serving as a scribe to document services personally performed by Yissel De León PA-C based on data collection and the provider's statements to me.     Provider Disclosure:   The documentation recorded by the scribe accurately reflects the services I personally performed and the decisions made by me.    All risks, benefits and alternatives were discussed with patient.  Patient is in agreement and understands the assessment and plan.  All questions were answered.    Yissel De León PA-C, [facility]  [facility]: Phone: [phone], Fax: [phone]  [facility]: Phone: [phone],  Fax: [phone]  [facility]: Phone: [phone], Fax: [phone]    ____________________________________________    CC: Skin Check (FBSC, skin tag removal)    HPI:  Mr. Neptali Ospina is a(n) 52 year old male who presents today as a return patient for a FBSC and skin tag removal. Patient is new to me.  Last seen in Dermatology on 9/23/2021 by Dr. Becerra at which time pt underwent a shave biopsy of his right lower back, which returned as a DN with moderate atypia.    Today, pt reports several skin tags on his face and back that he would like treated. Additionally, pt would like  a once over for skin cancer.    Patient is otherwise feeling well, without additional skin concerns.    Labs Reviewed:  N/A    Physical Exam:  Vitals: There were no vitals taken for this visit.  SKIN: Full skin, which includes the head/face, both arms, chest, back, abdomen,both legs, genitalia and/or groin buttocks, digits and/or nails, was examined.  - Dre II  - 4 mm asymmetrically-colored macule with globules in the periphery on the mid chest.  - Pedunculated skin colored papule(s) on the upper back x1, left eyelid x3, left under eye x1, right eyelid x3   - There are dome shaped bright red papules on the trunk and extremities.   - Multiple regular brown pigmented macules and papules are identified on the trunk and extremities.   - Scattered brown macules on sun exposed areas.  - There are waxy stuck on tan to brown papules on the trunk and extremities.    - No other lesions of concern on areas examined.               Medications:  Current Outpatient Medications   Medication     escitalopram (LEXAPRO) 10 MG tablet     fish oil-omega-3 fatty acids (OMEGA 3) 1000 MG capsule     fluticasone (FLONASE) 50 MCG/ACT nasal spray     lisinopril (ZESTRIL) 20 MG tablet     LORazepam (ATIVAN) 0.5 MG tablet     magnesium oxide 200 MG TABS     Multiple Vitamin (MULTI-VITAMIN) per tablet     Semaglutide-Weight Management (WEGOVY) 0.5 MG/0.5ML SOAJ     simvastatin (ZOCOR) 20 MG tablet     Semaglutide-Weight Management (WEGOVY) 1 MG/0.5ML SOAJ     Semaglutide-Weight Management 0.25 MG/0.5ML SOAJ     No current facility-administered medications for this visit.      Past Medical History:   Patient Active Problem List   Diagnosis     DANIELLE on CPAP     Vitamin D deficiency     Left thyroid nodule     Anxiety     CARDIOVASCULAR SCREENING; LDL GOAL LESS THAN 160     Morbid obesity (H)     Paroxysmal ventricular tachycardia     Hepatic steatosis     Past Medical History:   Diagnosis Date     High triglycerides      HSV-1 infection 3/13/2018      DARLING (nonalcoholic steatohepatitis)     12/10      DANIELLE on CPAP      Palpitations      Paroxysmal ventricular tachycardia      Strabismus      Thyroid nodule 02/2012    biopsy thyroglossal ductal cyst.  see Endo     Vitamin D deficiency      White coat syndrome without diagnosis of hypertension 11/22/2013    Home readings of 106/65            Again, thank you for allowing me to participate in the care of your patient.        Sincerely,        Yissel De León PA-C

## 2023-02-20 NOTE — PATIENT INSTRUCTIONS
Cryotherapy    What is it?  Use of a very cold liquid, such as liquid nitrogen, to freeze and destroy abnormal skin cells that need to be removed    What should I expect?  Tenderness and redness  A small blister that might grow and fill with dark purple blood. There may be crusting.  More than one treatment may be needed if the lesions do not go away.    How do I care for the treated area?  Gently wash the area with your hands when bathing.  Use a thin layer of Vaseline to help with healing. You may use a Band-Aid.   The area should heal within 7-10 days and may leave behind a pink or lighter color.   Do not use an antibiotic or Neosporin ointment.   You may take acetaminophen (Tylenol) for pain.     Call your doctor if you have:  Severe pain  Signs of infection (warmth, redness, cloudy yellow drainage, and or a bad smell)  Questions or concerns    Who should I call with questions?      [facility]: [phone]      [facility]: [phone]      For urgent needs outside of business hours call the [facility] at [phone] and ask for the dermatology resident on call     Wound Care After a Biopsy    What is a skin biopsy?  A skin biopsy allows the doctor to examine a very small piece of tissue under the microscope to determine the diagnosis and the best treatment for the skin condition. A local anesthetic (numbing medicine)  is injected with a very small needle into the skin area to be tested. A small piece of skin is taken from the area. Sometimes a suture (stitch) is used.     What are the risks of a skin biopsy?  I will experience scar, bleeding, swelling, pain, crusting and redness. I may experience incomplete removal or recurrence. Risks of this procedure are excessive bleeding, bruising, infection, nerve damage, numbness, thick (hypertrophic or keloidal) scar and non-diagnostic biopsy.    How should I care for my wound for the first 24  hours?  Keep the wound dry and covered for 24 hours  If it bleeds, hold direct pressure on the area for 15 minutes. If bleeding does not stop then go to the emergency room  Avoid strenuous exercise the first 1-2 days or as your doctor instructs you    How should I care for the wound after 24 hours?  After 24 hours, remove the bandage  You may bathe or shower as normal  If you had a scalp biopsy, you can shampoo as usual and can use shower water to clean the biopsy site daily  Clean the wound twice a day with gentle soap and water  Do not scrub, be gentle  Apply white petroleum/Vaseline after cleaning the wound with a cotton swab or a clean finger, and keep the site covered with a Bandaid /bandage. Bandages are not necessary with a scalp biopsy  If you are unable to cover the site with a Bandaid /bandage, re-apply ointment 2-3 times a day to keep the site moist. Moisture will help with healing  Avoid strenuous activity for first 1-2 days  Avoid lakes, rivers, pools, and oceans until the stitches are removed or the site is healed    How do I clean my wound?  Wash hands thoroughly with soap or use hand  before all wound care  Clean the wound with gentle soap and water  Apply white petroleum/Vaseline  to wound after it is clean  Replace the Bandaid /bandage to keep the wound covered for the first few days or as instructed by your doctor  If you had a scalp biopsy, warm shower water to the area on a daily basis should suffice    What should I use to clean my wound?   Cotton-tipped applicators (Qtips )  White petroleum jelly (Vaseline ). Use a clean new container and use Q-tips to apply.  Bandaids   as needed  Gentle soap     How should I care for my wound long term?  Do not get your wound dirty  Keep up with wound care for one week or until the area is healed.  A small scab will form and fall off by itself when the area is completely healed. The area will be red and will become pink in color as it heals. Sun  protection is very important for how your scar will turn out. Sunscreen with an SPF 30 or greater is recommended once the area is healed.  You should have some soreness but it should be mild and slowly go away over several days. Talk to your doctor about using tylenol for pain,    When should I call my doctor?  If you have increased:   Pain or swelling  Pus or drainage (clear or slightly yellow drainage is ok)  Temperature over 100F  Spreading redness or warmth around wound    When will I hear about my results?  The biopsy results can take 2 weeks to come back.  Your results will automatically release to [email or web address] before your provider has even reviewed them.  The clinic will call you with the results, send you a F[employer] message, or have you schedule a follow-up clinic or phone time to discuss the results.  Contact our clinics if you do not hear from us in 2 weeks.    Who should I call with questions?  [facility]: [phone]  [facility]: [phone]  For urgent needs outside of business hours call the [facility] at [phone] and ask for the dermatology resident on call       Patient Education     Checking for Skin Cancer  You can find cancer early by checking your skin each month. There are 3 kinds of skin cancer. They are melanoma, basal cell carcinoma, and squamous cell carcinoma. Doing monthly skin checks is the best way to find new marks or skin changes. Follow the instructions below for checking your skin.   The ABCDEs of checking moles for melanoma   Check your moles or growths for signs of melanoma using ABCDE:   Asymmetry: the sides of the mole or growth don t match  Border: the edges are ragged, notched, or blurred  Color: the color within the mole or growth varies  Diameter: the mole or growth is larger than 6 mm (size of a pencil eraser)  Evolving: the size, shape, or color of the mole or growth is changing (evolving is not shown in  the images below)    Checking for other types of skin cancer  Basal cell carcinoma or squamous cell carcinoma have symptoms such as:     A spot or mole that looks different from all other marks on your skin  Changes in how an area feels, such as itching, tenderness, or pain  Changes in the skin's surface, such as oozing, bleeding, or scaliness  A sore that does not heal  New swelling or redness beyond the border of a mole    Who s at risk?  Anyone can get skin cancer. But you are at greater risk if you have:   Fair skin, light-colored hair, or light-colored eyes  Many moles or abnormal moles on your skin  A history of sunburns from sunlight or tanning beds  A family history of skin cancer  A history of exposure to radiation or chemicals  A weakened immune system  If you have had skin cancer in the past, you are at risk for recurring skin cancer.   How to check your skin  Do your monthly skin checkups in front of a full-length mirror. Check all parts of your body, including your:   Head (ears, face, neck, and scalp)  Torso (front, back, and sides)  Arms (tops, undersides, upper, and lower armpits)  Hands (palms, backs, and fingers, including under the nails)  Buttocks and genitals  Legs (front, back, and sides)  Feet (tops, soles, toes, including under the nails, and between toes)  If you have a lot of moles, take digital photos of them each month. Make sure to take photos both up close and from a distance. These can help you see if any moles change over time.   Most skin changes are not cancer. But if you see any changes in your skin, call your doctor right away. Only he or she can diagnose a problem. If you have skin cancer, seeing your doctor can be the first step toward getting the treatment that could save your life.   [employer] last reviewed this educational content on 4/1/2019 2000-2020 [employer]. [address] [place] [address], [place] [address]. All rights reserved. This information is not intended  as a substitute for professional medical care. Always follow your healthcare professional's instructions.       When should I call my doctor?  If you are worsening or not improving, please, contact us or seek urgent care as noted below.     Who should I call with questions (adults)?  [facility] (adult and pediatric): [phone]  [facility] (adult): [phone]  For urgent needs outside of business hours call the [facility] at [phone] and ask for the dermatology resident on call to be paged  If this is a medical emergency and you are unable to reach an ER, Call 911    Who should I call with questions (pediatric)?  [facility]- Pediatric Dermatology  Dr. Nalini Angela, Dr. Sharonda Pelayo, Dr. Gabbie Alvarez, NARENDRA Ferris, Dr. Maria M Angeles, Dr. Larisa Mahoney & Dr. Rajinder Hernandez  Non-urgent nurse triage line; [phone]- Seema and Fiordaliza [facility] Care Coordinators   [place] (/Complex ) [phone]    If you need a prescription refill, please contact your pharmacy. Refills are approved or denied by our Physicians during normal business hours, Monday through Fridays  Per office policy, refills will not be granted if you have not been seen within the past year (or sooner depending on your child's condition)    Scheduling Information:  Pediatric Appointment Scheduling and Call Center [phone]  Radiology Scheduling- [phone]  Sedation Unit Scheduling- [phone]  [facility] Scheduling- General [phone]; Pediatric Dermatology [phone]  Main  Services: [phone]  Malay: [phone]  Algerian: [phone]  Hmong/Citizen of [place]/Andorran: [phone]  Preadmission Nursing Department Fax Number: [phone] (Fax all pre-operative paperwork to this number)    For urgent matters arising during evenings, weekends, or holidays that cannot wait for normal business hours  please call [phone] and ask for the dermatology resident on call to be paged.

## 2023-02-20 NOTE — PROGRESS NOTES
[facility] Dermatology Note  Encounter Date: Feb 20, 2023  Office Visit     Dermatology Problem List:  # NUB - mid chest, s/p shave biopsy 2/20/2023  1. DN, moderate atypia, right lower back - s/p shave biopsy 9/23/2021  ____________________________________________    Assessment & Plan:    # Neoplasm of unspecified behavior of the skin (D49.2) on the mid chest. The differential diagnosis includes DN vs MM vs other   - Shave biopsy performed today (see procedure note(s) below).    # Skin tags, upper back x1, left eyelid x3, left under eye x1, right eyelid x3   - Cryotherapy performed today (see procedure note(s) below).     # Benign lesions: Multiple benign nevi, solar lentigos, seborrheic keratoses, cherry angiomas. Explained to patient benign nature of lesion. No treatment is necessary at this time unless the lesion changes or becomes symptomatic.   - ABCDs of melanoma were discussed and self skin checks were advised.  - Sun precaution was advised including the use of sun screens of SPF 30 or higher, sun protective clothing, and avoidance of tanning beds.     # Hx dysplastic nevus - back  - continue annual skin exams  - edu on increased melanoma risk    Procedures Performed:   - Shave biopsy procedure note, location(s): see above. After discussion of benefits and risks including but not limited to bleeding, infection, scar, incomplete removal, recurrence, and non-diagnostic biopsy, written consent and photographs were obtained. The area was cleaned with isopropyl alcohol. 0.5mL of 1% lidocaine with epinephrine was injected to obtain adequate anesthesia of lesion(s). Shave biopsy at site(s) performed. Hemostasis was achieved with aluminium chloride. Petrolatum ointment and a sterile dressing were applied. The patient tolerated the procedure and no complications were noted. The patient was provided with verbal and written post care instructions.   - Cryotherapy procedure note, location(s): see  above. After verbal consent and discussion of risks and benefits including, but not limited to, dyspigmentation/scar, blister, and pain, 8 lesion(s) was(were) treated with 1-2 mm freeze border for 1-2 cycles with liquid nitrogen. Post cryotherapy instructions were provided.    Follow-up: pending path results otherwise 1-year for a FBSC.    Staff and Scribe:     Scribe Disclosure:  I, Timur Solomon, am serving as a scribe to document services personally performed by Yissel De León PA-C based on data collection and the provider's statements to me.     Provider Disclosure:   The documentation recorded by the scribe accurately reflects the services I personally performed and the decisions made by me.    All risks, benefits and alternatives were discussed with patient.  Patient is in agreement and understands the assessment and plan.  All questions were answered.    Yissel De León PA-C, [facility]  [facility] [place]: Phone: [phone], Fax: [phone]  [facility]: Phone: [phone],  Fax: [phone]  [facility]: Phone: [phone], Fax: [phone]    ____________________________________________    CC: Skin Check (FBSC, skin tag removal)    HPI:  Mr. Neptali Ospina is a(n) 52 year old male who presents today as a return patient for a FBSC and skin tag removal. Patient is new to me.  Last seen in Dermatology on 9/23/2021 by Dr. Becerra at which time pt underwent a shave biopsy of his right lower back, which returned as a DN with moderate atypia.    Today, pt reports several skin tags on his face and back that he would like treated. Additionally, pt would like a once over for skin cancer.    Patient is otherwise feeling well, without additional skin concerns.    Labs Reviewed:  N/A    Physical Exam:  Vitals: There were no vitals taken for this visit.  SKIN: Full skin, which includes the head/face, both arms,  chest, back, abdomen,both legs, genitalia and/or groin buttocks, digits and/or nails, was examined.  - Dre II  - 4 mm asymmetrically-colored macule with globules in the periphery on the mid chest.  - Pedunculated skin colored papule(s) on the upper back x1, left eyelid x3, left under eye x1, right eyelid x3   - There are dome shaped bright red papules on the trunk and extremities.   - Multiple regular brown pigmented macules and papules are identified on the trunk and extremities.   - Scattered brown macules on sun exposed areas.  - There are waxy stuck on tan to brown papules on the trunk and extremities.    - No other lesions of concern on areas examined.               Medications:  Current Outpatient Medications   Medication     escitalopram (LEXAPRO) 10 MG tablet     fish oil-omega-3 fatty acids (OMEGA 3) 1000 MG capsule     fluticasone (FLONASE) 50 MCG/ACT nasal spray     lisinopril (ZESTRIL) 20 MG tablet     LORazepam (ATIVAN) 0.5 MG tablet     magnesium oxide 200 MG TABS     Multiple Vitamin (MULTI-VITAMIN) per tablet     Semaglutide-Weight Management (WEGOVY) 0.5 MG/0.5ML SOAJ     simvastatin (ZOCOR) 20 MG tablet     Semaglutide-Weight Management (WEGOVY) 1 MG/0.5ML SOAJ     Semaglutide-Weight Management 0.25 MG/0.5ML SOAJ     No current facility-administered medications for this visit.      Past Medical History:   Patient Active Problem List   Diagnosis     DANIELLE on CPAP     Vitamin D deficiency     Left thyroid nodule     Anxiety     CARDIOVASCULAR SCREENING; LDL GOAL LESS THAN 160     Morbid obesity (H)     Paroxysmal ventricular tachycardia     Hepatic steatosis     Past Medical History:   Diagnosis Date     High triglycerides      HSV-1 infection 3/13/2018     DARLING (nonalcoholic steatohepatitis)     12/10      DANIELLE on CPAP      Palpitations      Paroxysmal ventricular tachycardia      Strabismus      Thyroid nodule 02/2012    biopsy thyroglossal ductal cyst.  see Endo     Vitamin D deficiency      White  coat syndrome without diagnosis of hypertension 11/22/2013    Home readings of 106/65

## 2023-04-19 DIAGNOSIS — G47.33 OBSTRUCTIVE SLEEP APNEA (ADULT) (PEDIATRIC): Primary | ICD-10-CM

## 2023-04-24 ENCOUNTER — VIRTUAL VISIT (OUTPATIENT)
Dept: FAMILY MEDICINE | Facility: CLINIC | Age: 53
End: 2023-04-24
Payer: COMMERCIAL

## 2023-04-24 DIAGNOSIS — E66.01 MORBID OBESITY (H): Primary | ICD-10-CM

## 2023-04-24 PROCEDURE — 99213 OFFICE O/P EST LOW 20 MIN: CPT | Mod: VID | Performed by: PHYSICIAN ASSISTANT

## 2023-04-24 NOTE — PROGRESS NOTES
Neptali is a 53 year old who is being evaluated via a billable video visit.      How would you like to obtain your AVS? MyChart  If the video visit is dropped, the invitation should be resent by: Text to cell phone: [phone]  Will anyone else be joining your video visit? No          Assessment & Plan     Morbid obesity (H)  He is doing well with wegovy so far. Plan to increase to 1.7 mg dose for 4 weeks, then go up to 2.4 mg dose. Follow-up in 2 months for med check, weight check, and to see how he is doing. He will let me know if he has trouble tolerating increased dose and we could temporarily decrease if needed.  - Semaglutide-Weight Management (WEGOVY) 1.7 MG/0.75ML pen; Inject 1.7 mg Subcutaneous once a week For 4 weeks, then increase to 2.4 mg once weekly  - Semaglutide-Weight Management (WEGOVY) 2.4 MG/0.75ML pen; Inject 2.4 mg Subcutaneous once a week Start AFTER completing 4 weeks of 1.7 mg once weekly    [facility]   Neptali is a 53 year old, presenting for the following health issues:  Recheck Medication and Refill Request        4/24/2023     8:24 AM   Additional Questions   Roomed by SHANNON Maya     History of Present Illness       Reason for visit:  Perscription refill    He eats 0-1 servings of fruits and vegetables daily.He consumes 2 sweetened beverage(s) daily.He exercises with enough effort to increase his heart rate 60 or more minutes per day.  He exercises with enough effort to increase his heart rate 5 days per week. He is missing 1 dose(s) of medications per week.  He is not taking prescribed medications regularly due to remembering to take.       Medication Followup of Wegovy    Taking Medication as prescribed: yes    Side Effects:  Stomach upset    Medication Helping Symptoms:  yes    Started wegovy about 3 months ago, currently at 1 mg weekly dosing for the past month. He has noticed some nausea, GI upset,  constipation but says this is mild and tolerable. He otherwise feels like things are going really well and says the medication is working. Appetite is less, he feels full faster, can't eat as much as he could in the past. He has lost about 15 pounds in the past 3 months, reports current weight is 227 lbs. He is motivated to continue with lifestyle changes and has been working on healthy diet and exercise.    Current weight: 227 lbs at home    Wt Readings from Last 4 Encounters:   01/24/23 109.8 kg (242 lb 1.6 oz)   04/12/22 108.9 kg (240 lb)   01/28/22 103.4 kg (228 lb)   01/21/22 104.6 kg (230 lb 11.2 oz)       Review of Systems   Constitutional, HEENT, cardiovascular, pulmonary, gi and gu systems are negative, except as otherwise noted.      Objective    Vitals - Patient Reported  Weight (Patient Reported): 103 kg (227 lb)      Vitals:  No vitals were obtained today due to virtual visit.    Physical Exam   GENERAL: Healthy, alert and no distress  EYES: Eyes grossly normal to inspection.  No discharge or erythema, or obvious scleral/conjunctival abnormalities.  RESP: No audible wheeze, cough, or visible cyanosis.  No visible retractions or increased work of breathing.    SKIN: Visible skin clear. No significant rash, abnormal pigmentation or lesions.  NEURO: Cranial nerves grossly intact.  Mentation and speech appropriate for age.  PSYCH: Mentation appears normal, affect normal/bright, judgement and insight intact, normal speech and appearance well-groomed.      Video-Visit Details    Type of service:  Video Visit     Originating Location (pt. Location): Home    Distant Location (provider location):  On-site  Platform used for Video Visit: [employer]

## 2023-08-24 DIAGNOSIS — E66.01 MORBID OBESITY (H): ICD-10-CM

## 2023-08-25 NOTE — TELEPHONE ENCOUNTER
Sent [employer] message requesting a call back for an appt. Two more attempts will be made.     Jeannie Franks

## 2023-08-25 NOTE — TELEPHONE ENCOUNTER
Routing refill request to provider for review/approval because:  Drug not on the [facility] refill protocol     Brittanie Leung RN

## 2023-09-21 DIAGNOSIS — E66.01 MORBID OBESITY (H): ICD-10-CM

## 2023-09-22 NOTE — TELEPHONE ENCOUNTER
Routing refill request to provider for review/approval because:  Drug not on the [facility] refill protocol   Maribel Lacey RN, BSN  [facility]

## 2023-09-29 ENCOUNTER — OFFICE VISIT (OUTPATIENT)
Dept: FAMILY MEDICINE | Facility: CLINIC | Age: 53
End: 2023-09-29
Payer: COMMERCIAL

## 2023-09-29 VITALS
SYSTOLIC BLOOD PRESSURE: 158 MMHG | BODY MASS INDEX: 34.07 KG/M2 | HEIGHT: 69 IN | TEMPERATURE: 98.4 F | RESPIRATION RATE: 10 BRPM | DIASTOLIC BLOOD PRESSURE: 89 MMHG | HEART RATE: 91 BPM | WEIGHT: 230 LBS | OXYGEN SATURATION: 99 %

## 2023-09-29 DIAGNOSIS — E66.01 MORBID OBESITY (H): Primary | ICD-10-CM

## 2023-09-29 DIAGNOSIS — I10 ESSENTIAL HYPERTENSION: ICD-10-CM

## 2023-09-29 PROCEDURE — 90686 IIV4 VACC NO PRSV 0.5 ML IM: CPT | Performed by: PHYSICIAN ASSISTANT

## 2023-09-29 PROCEDURE — 99213 OFFICE O/P EST LOW 20 MIN: CPT | Mod: 25 | Performed by: PHYSICIAN ASSISTANT

## 2023-09-29 PROCEDURE — 90471 IMMUNIZATION ADMIN: CPT | Performed by: PHYSICIAN ASSISTANT

## 2023-09-29 ASSESSMENT — PAIN SCALES - GENERAL: PAINLEVEL: NO PAIN (0)

## 2023-09-29 NOTE — PROGRESS NOTES
Assessment & Plan     Morbid obesity (H)  He is doing well with wegovy so far. Plan to continue with 2.4 mg dose and continue to work on healthy diet/exercise. Will follow-up at physical in 3-4 months.  - Semaglutide-Weight Management (WEGOVY) 2.4 MG/0.75ML pen; Inject 2.4 mg Subcutaneous once a week    Essential hypertension  BP is always high in clinic - white coat HTN. Takes BP at home and it is well controlled. BP last night was 122/83.      Padmini Thompson PA-C  [facility] TWILA Gunderson is a 53 year old, presenting for the following health issues:  Recheck Medication        9/29/2023     9:35 AM   Additional Questions   Roomed by Shraddha ORTEGA       History of Present Illness       Reason for visit:  Wugovdaja follow up    He eats 0-1 servings of fruits and vegetables daily.He consumes 1 sweetened beverage(s) daily.He exercises with enough effort to increase his heart rate 30 to 60 minutes per day.  He exercises with enough effort to increase his heart rate 6 days per week. He is missing 1 dose(s) of medications per week.       Medication Followup of Wegovy  Taking Medication as prescribed: yes  Side Effects:  None  Medication Helping Symptoms:  yes    Started wegovy 7-8 months ago. Taking 2.4 mg weekly. Some constipation but otherwise no side effects. He has lost about 12 pounds since starting. Feels like the wegovy is helping with weight loss. Appetite is less, he feels full faster, can't eat as much as he could in the past. Admits he should be making healthier food choices and will continue to work on this. Exercises at [employer] or walking daily.    BP is always high in clinic - white coat HTN. Takes BP at home and it is well controlled. BP last night was 122/83.    Wt Readings from Last 4 Encounters:   09/29/23 104.3 kg (230 lb)   01/24/23 109.8 kg (242 lb 1.6 oz)   04/12/22 108.9 kg (240 lb)   01/28/22 103.4 kg (228 lb)       Review of Systems   Constitutional,  "HEENT, cardiovascular, pulmonary, gi and gu systems are negative, except as otherwise noted.      Objective    BP (!) 158/89 (BP Location: Right arm, Patient Position: Sitting, Cuff Size: Adult Large)   Pulse 91   Temp 98.4  F (36.9  C) (Oral)   Resp 10   Ht 1.759 m (5' 9.25\")   Wt 104.3 kg (230 lb)   SpO2 99%   BMI 33.72 kg/m    Body mass index is 33.72 kg/m .  Physical Exam   GENERAL: healthy, alert and no distress  RESP: breathing comfortably  CV: regular rate and rhythm, normal S1 S2, no S3 or S4, no murmur, click or rub  PSYCH: mentation appears normal, affect normal/bright          "

## 2024-01-05 ENCOUNTER — OFFICE VISIT (OUTPATIENT)
Dept: FAMILY MEDICINE | Facility: CLINIC | Age: 54
End: 2024-01-05
Attending: PHYSICIAN ASSISTANT
Payer: COMMERCIAL

## 2024-01-05 VITALS
DIASTOLIC BLOOD PRESSURE: 90 MMHG | WEIGHT: 238 LBS | RESPIRATION RATE: 16 BRPM | SYSTOLIC BLOOD PRESSURE: 136 MMHG | TEMPERATURE: 98.4 F | HEIGHT: 69 IN | BODY MASS INDEX: 35.25 KG/M2 | HEART RATE: 77 BPM | OXYGEN SATURATION: 97 %

## 2024-01-05 DIAGNOSIS — F41.9 ANXIETY: ICD-10-CM

## 2024-01-05 DIAGNOSIS — I10 ESSENTIAL HYPERTENSION: ICD-10-CM

## 2024-01-05 DIAGNOSIS — E04.1 LEFT THYROID NODULE: ICD-10-CM

## 2024-01-05 DIAGNOSIS — E78.2 MIXED HYPERLIPIDEMIA: ICD-10-CM

## 2024-01-05 DIAGNOSIS — Z12.5 SCREENING FOR PROSTATE CANCER: ICD-10-CM

## 2024-01-05 DIAGNOSIS — E66.01 MORBID OBESITY (H): ICD-10-CM

## 2024-01-05 DIAGNOSIS — I47.29 PAROXYSMAL VENTRICULAR TACHYCARDIA (H): ICD-10-CM

## 2024-01-05 DIAGNOSIS — K76.0 NONALCOHOLIC FATTY LIVER: ICD-10-CM

## 2024-01-05 DIAGNOSIS — Z00.00 ROUTINE GENERAL MEDICAL EXAMINATION AT A HEALTH CARE FACILITY: Primary | ICD-10-CM

## 2024-01-05 DIAGNOSIS — Z13.1 SCREENING FOR DIABETES MELLITUS: ICD-10-CM

## 2024-01-05 LAB
ALBUMIN SERPL BCG-MCNC: 4.4 G/DL (ref 3.5–5.2)
ALP SERPL-CCNC: 52 U/L (ref 40–150)
ALT SERPL W P-5'-P-CCNC: 61 U/L (ref 0–70)
ANION GAP SERPL CALCULATED.3IONS-SCNC: 12 MMOL/L (ref 7–15)
AST SERPL W P-5'-P-CCNC: 38 U/L (ref 0–45)
BILIRUB SERPL-MCNC: 0.4 MG/DL
BUN SERPL-MCNC: 17.7 MG/DL (ref 6–20)
CALCIUM SERPL-MCNC: 9.3 MG/DL (ref 8.6–10)
CHLORIDE SERPL-SCNC: 106 MMOL/L (ref 98–107)
CHOLEST SERPL-MCNC: 177 MG/DL
CREAT SERPL-MCNC: 0.9 MG/DL (ref 0.67–1.17)
DEPRECATED HCO3 PLAS-SCNC: 27 MMOL/L (ref 22–29)
EGFRCR SERPLBLD CKD-EPI 2021: >90 ML/MIN/1.73M2
FASTING STATUS PATIENT QL REPORTED: YES
GLUCOSE SERPL-MCNC: 114 MG/DL (ref 70–99)
HBA1C MFR BLD: 5.2 % (ref 0–5.6)
HDLC SERPL-MCNC: 35 MG/DL
LDLC SERPL CALC-MCNC: 107 MG/DL
NONHDLC SERPL-MCNC: 142 MG/DL
POTASSIUM SERPL-SCNC: 4.3 MMOL/L (ref 3.4–5.3)
PROT SERPL-MCNC: 7.2 G/DL (ref 6.4–8.3)
PSA SERPL DL<=0.01 NG/ML-MCNC: 0.64 NG/ML (ref 0–3.5)
SODIUM SERPL-SCNC: 145 MMOL/L (ref 135–145)
T4 FREE SERPL-MCNC: 1.06 NG/DL (ref 0.9–1.7)
TRIGL SERPL-MCNC: 174 MG/DL
TSH SERPL DL<=0.005 MIU/L-ACNC: 4.43 UIU/ML (ref 0.3–4.2)

## 2024-01-05 PROCEDURE — G0103 PSA SCREENING: HCPCS | Performed by: PHYSICIAN ASSISTANT

## 2024-01-05 PROCEDURE — 84439 ASSAY OF FREE THYROXINE: CPT | Performed by: PHYSICIAN ASSISTANT

## 2024-01-05 PROCEDURE — 90471 IMMUNIZATION ADMIN: CPT | Performed by: PHYSICIAN ASSISTANT

## 2024-01-05 PROCEDURE — 90677 PCV20 VACCINE IM: CPT | Performed by: PHYSICIAN ASSISTANT

## 2024-01-05 PROCEDURE — 90472 IMMUNIZATION ADMIN EACH ADD: CPT | Performed by: PHYSICIAN ASSISTANT

## 2024-01-05 PROCEDURE — 36415 COLL VENOUS BLD VENIPUNCTURE: CPT | Performed by: PHYSICIAN ASSISTANT

## 2024-01-05 PROCEDURE — 80053 COMPREHEN METABOLIC PANEL: CPT | Performed by: PHYSICIAN ASSISTANT

## 2024-01-05 PROCEDURE — 80061 LIPID PANEL: CPT | Performed by: PHYSICIAN ASSISTANT

## 2024-01-05 PROCEDURE — 90480 ADMN SARSCOV2 VAC 1/ONLY CMP: CPT | Performed by: PHYSICIAN ASSISTANT

## 2024-01-05 PROCEDURE — 83036 HEMOGLOBIN GLYCOSYLATED A1C: CPT | Performed by: PHYSICIAN ASSISTANT

## 2024-01-05 PROCEDURE — 90746 HEPB VACCINE 3 DOSE ADULT IM: CPT | Performed by: PHYSICIAN ASSISTANT

## 2024-01-05 PROCEDURE — 91320 SARSCV2 VAC 30MCG TRS-SUC IM: CPT | Performed by: PHYSICIAN ASSISTANT

## 2024-01-05 PROCEDURE — 99396 PREV VISIT EST AGE 40-64: CPT | Mod: 25 | Performed by: PHYSICIAN ASSISTANT

## 2024-01-05 PROCEDURE — 99214 OFFICE O/P EST MOD 30 MIN: CPT | Mod: 25 | Performed by: PHYSICIAN ASSISTANT

## 2024-01-05 PROCEDURE — 84443 ASSAY THYROID STIM HORMONE: CPT | Performed by: PHYSICIAN ASSISTANT

## 2024-01-05 RX ORDER — SIMVASTATIN 20 MG
TABLET ORAL
Qty: 90 TABLET | Refills: 3 | Status: SHIPPED | OUTPATIENT
Start: 2024-01-05

## 2024-01-05 RX ORDER — LISINOPRIL 20 MG/1
20 TABLET ORAL DAILY
Qty: 90 TABLET | Refills: 3 | Status: SHIPPED | OUTPATIENT
Start: 2024-01-05

## 2024-01-05 RX ORDER — ESCITALOPRAM OXALATE 10 MG/1
10 TABLET ORAL DAILY
Qty: 90 TABLET | Refills: 3 | Status: SHIPPED | OUTPATIENT
Start: 2024-01-05

## 2024-01-05 ASSESSMENT — ENCOUNTER SYMPTOMS
CHILLS: 0
FEVER: 0
MYALGIAS: 0
HEMATOCHEZIA: 0
EYE PAIN: 0
ABDOMINAL PAIN: 0
PALPITATIONS: 0
WEAKNESS: 0
SHORTNESS OF BREATH: 0
PARESTHESIAS: 0
NAUSEA: 0
SORE THROAT: 0
CONSTIPATION: 0
HEARTBURN: 0
ARTHRALGIAS: 0
DIARRHEA: 0
HEMATURIA: 0
HEADACHES: 0
DIZZINESS: 0
JOINT SWELLING: 0
COUGH: 0
FREQUENCY: 0
DYSURIA: 0
NERVOUS/ANXIOUS: 0

## 2024-01-05 ASSESSMENT — PAIN SCALES - GENERAL: PAINLEVEL: NO PAIN (0)

## 2024-01-05 NOTE — PROGRESS NOTES
SUBJECTIVE:   Neptali is a 53 year old, presenting for the following:  Physical        1/5/2024     7:03 AM   Additional Questions   Roomed by arvin gaona cma   Accompanied by self         1/5/2024     7:03 AM   Patient Reported Additional Medications   Patient reports taking the following new medications na       Healthy Habits:     Getting at least 3 servings of Calcium per day:  NO    Bi-annual eye exam:  Yes    Dental care twice a year:  Yes    Sleep apnea or symptoms of sleep apnea:  Sleep apnea    Diet:  Regular (no restrictions)    Frequency of exercise:  4-5 days/week    Duration of exercise:  Greater than 60 minutes    Taking medications regularly:  Yes    Medication side effects:  None    Additional concerns today:  No    Insurance will no longer cover Wegovy and he probably won't be able to stay on it due to cost. He ran out of ORVIBO about 3 weeks ago. Appetite has increased since off medication and he has gained some weight. He is still exercising regularly. He does not want to see weight clinic at this time. He would like to try having refill sent in just to see what the cost would be.    HTN:  BP is always high in clinic - white coat HTN. Takes BP at home and it is well controlled. Blood pressure 130/80 at home last night. No chest pain, shortness of breath, swelling in the extremities, palpitations, dizziness, headaches, blurred vision. Taking lisinopril 20 mg daily, tolerating medication well. Occasionally will have brief lightheadedness if he gets up from laying down too fast but otherwise no lightheadedness/dizziness.    History of sustained monomorphic VT, s/p ablation at [facility] (12/2021). He saw cardiology on 1/21/22 and does not need further follow-up unless concerns. He feels really good. Denies palpitations, chest pain, shortness of breath.     Hyperlipidemia Follow-Up     Are you regularly taking any medication or supplement to lower your cholesterol?   Yes- simvastatin  Are you having  muscle aches or other side effects that you think could be caused by your cholesterol lowering medication?  No    Anxiety overall feels well managed with escitalopram. No medication side effects or concerns.        11/2/2020     3:25 PM 2/12/2022    11:45 AM 1/8/2023     1:02 PM   WALDO-7 SCORE   Total Score  0 (minimal anxiety) 7 (mild anxiety)   Total Score 6 0 7       Regular skin checks with dermatology.    Social History     Tobacco Use    Smoking status: Former     Types: Cigarettes    Smokeless tobacco: Never    Tobacco comments:     Quit smoking 2007   Substance Use Topics    Alcohol use: Yes     Alcohol/week: 0.0 standard drinks of alcohol     Comment: very occ.             1/5/2024     7:06 AM   Alcohol Use   Prescreen: >3 drinks/day or >7 drinks/week? No       Last PSA:   Prostate Specific Antigen Screen   Date Value Ref Range Status   11/16/2021 0.56 0.00 - 4.00 ug/L Final       Reviewed orders with patient. Reviewed health maintenance and updated orders accordingly - Yes  Lab work is in process  Labs reviewed in EPIC  BP Readings from Last 3 Encounters:   01/05/24 (!) 136/90   09/29/23 (!) 158/89   01/24/23 (!) 148/88    Wt Readings from Last 3 Encounters:   01/05/24 108 kg (238 lb)   09/29/23 104.3 kg (230 lb)   01/24/23 109.8 kg (242 lb 1.6 oz)                  Patient Active Problem List   Diagnosis    DANIELLE on CPAP    Vitamin D deficiency    Left thyroid nodule    Anxiety    Morbid obesity (H)    Paroxysmal ventricular tachycardia (H)    Nonalcoholic fatty liver     Past Surgical History:   Procedure Laterality Date    COLONOSCOPY N/A 1/27/2022    Procedure: COLONOSCOPY, WITH POLYPECTOMY using forceps;  Surgeon: Po Melendrez MD;  Location:  GI    CV HEART CATHETERIZATION WITH POSSIBLE INTERVENTION N/A 12/10/2021    Procedure: Heart Catheterization with Possible Intervention;  Surgeon: Quyen Lomas MD;  Location:  HEART CARDIAC CATH LAB    EP ABLATION VT/PVC N/A 12/14/2021     Procedure: EP Ablation VT;  Surgeon: Zhen Saavedra MD;  Location:  HEART CARDIAC CATH LAB    PE TUBES      TONSILLECTOMY & ADENOIDECTOMY      VASECTOMY         Social History     Tobacco Use    Smoking status: Former     Types: Cigarettes    Smokeless tobacco: Never    Tobacco comments:     Quit smoking    Substance Use Topics    Alcohol use: Yes     Alcohol/week: 0.0 standard drinks of alcohol     Comment: very occ.     Family History   Problem Relation Age of Onset    C.A.D. Mother     Cancer Mother         brain tumor    Circulatory Father         sleep apnea,   in sleep 58    Sleep Apnea Father     Gynecology Sister         x2    Colon Cancer No family hx of          Current Outpatient Medications   Medication Sig Dispense Refill    escitalopram (LEXAPRO) 10 MG tablet Take 1 tablet (10 mg) by mouth daily 90 tablet 3    fish oil-omega-3 fatty acids (OMEGA 3) 1000 MG capsule Take 1 capsule by mouth 2 times daily. Lavazo fish oil 180 capsule 1    lisinopril (ZESTRIL) 20 MG tablet Take 1 tablet (20 mg) by mouth daily 90 tablet 3    LORazepam (ATIVAN) 0.5 MG tablet Take 2-4 tablets (1-2 mg) by mouth every 6 hours as needed for anxiety 8 tablet 0    magnesium oxide 200 MG TABS Take 200 mg by mouth daily      Multiple Vitamin (MULTI-VITAMIN) per tablet Take 1 tablet by mouth daily. 90 tablet 3    Semaglutide-Weight Management (WEGOVY) 2.4 MG/0.75ML pen Inject 2.4 mg Subcutaneous once a week 3 mL 3    simvastatin (ZOCOR) 20 MG tablet TAKE ONE TABLET BY MOUTH EVERY NIGHT AT BEDTIME 90 tablet 3    fluticasone (FLONASE) 50 MCG/ACT nasal spray Spray 1-2 sprays into both nostrils daily (Patient not taking: Reported on 2024) 1 Package 1     No Known Allergies  Recent Labs   Lab Test 24  0803 23  0836 22  0924 21  1304 12/10/21  1205 21  1321 21  0927 21  0927 20  1628 16  0821   A1C 5.2 5.4  --   --   --   --   --  5.5 5.4 5.5   LDL  --  96  --   --   --   --    --  96 96 77   HDL  --  35*  --   --   --   --   --  34* 37* 40   TRIG  --  262*  --   --   --   --   --  267* 235* 216*   ALT  --  68*  68*  --  34  --  67   < > 72* 84* 57   CR  --  0.98 0.93 0.82   < > 0.86   < > 0.94 0.87 0.92   GFRESTIMATED  --  >90 >90 >90   < > >90   < > >90 >90 89   GFRESTBLACK  --   --   --   --   --   --   --   --  >90 >90  African American GFR Calc     POTASSIUM  --  4.2 4.2 3.9   < > 3.8   < > 4.6 4.1 3.5   TSH  --   --   --   --   --  2.38  --  2.49 2.70 2.34    < > = values in this interval not displayed.        Reviewed and updated as needed this visit by clinical staff   Tobacco  Allergies  Meds  Problems  Med Hx  Surg Hx  Fam Hx          Reviewed and updated as needed this visit by Provider   Tobacco  Allergies  Meds  Problems  Med Hx  Surg Hx  Fam Hx         Past Medical History:   Diagnosis Date    High triglycerides     HSV-1 infection 3/13/2018    DARLING (nonalcoholic steatohepatitis)     12/10     DANIELLE on CPAP     Palpitations     Paroxysmal ventricular tachycardia (H)     Strabismus     Thyroid nodule 02/2012    biopsy thyroglossal ductal cyst.  see End[facility]    Vitamin D deficiency     White coat syndrome without diagnosis of hypertension 11/22/2013    Home readings of 106/65       Past Surgical History:   Procedure Laterality Date    COLONOSCOPY N/A 1/27/2022    Procedure: COLONOSCOPY, WITH POLYPECTOMY using forceps;  Surgeon: Po Melendrez MD;  Location:  GI    CV HEART CATHETERIZATION WITH POSSIBLE INTERVENTION N/A 12/10/2021    Procedure: Heart Catheterization with Possible Intervention;  Surgeon: Quyen Lomas MD;  Location:  HEART CARDIAC CATH LAB    EP ABLATION VT/PVC N/A 12/14/2021    Procedure: EP Ablation VT;  Surgeon: Zhen Saavedra MD;  Location:  HEART CARDIAC CATH LAB    PE TUBES      TONSILLECTOMY & ADENOIDECTOMY      VASECTOMY         Review of Systems   Constitutional:  Negative for chills and fever.   HENT:  Negative for  "congestion, ear pain, hearing loss and sore throat.    Eyes:  Negative for pain and visual disturbance.   Respiratory:  Negative for cough and shortness of breath.    Cardiovascular:  Negative for chest pain, palpitations and peripheral edema.   Gastrointestinal:  Negative for abdominal pain, constipation, diarrhea, heartburn, hematochezia and nausea.   Genitourinary:  Negative for dysuria, frequency, genital sores, hematuria, impotence, penile discharge and urgency.   Musculoskeletal:  Negative for arthralgias, joint swelling and myalgias.   Skin:  Negative for rash.   Neurological:  Negative for dizziness, weakness, headaches and paresthesias.   Psychiatric/Behavioral:  Negative for mood changes. The patient is not nervous/anxious.        OBJECTIVE:   BP (!) 136/90   Pulse 77   Temp 98.4  F (36.9  C) (Oral)   Resp 16   Ht 1.759 m (5' 9.25\")   Wt 108 kg (238 lb)   SpO2 97%   BMI 34.89 kg/m      Physical Exam  GENERAL: healthy, alert and no distress  EYES: Eyes grossly normal to inspection, PERRL and conjunctivae and sclerae normal  HENT: ear canals and TM's normal, nose and mouth without ulcers or lesions  NECK: no adenopathy  RESP: lungs clear to auscultation - no rales, rhonchi or wheezes  CV: regular rate and rhythm, normal S1 S2, no S3 or S4, no murmur, click or rub, no peripheral edema and peripheral pulses strong  ABDOMEN: soft, nontender, no hepatosplenomegaly, no masses and bowel sounds normal  MS: no gross musculoskeletal defects noted, no edema  NEURO: Normal strength and tone, mentation intact and speech normal  PSYCH: mentation appears normal, affect normal/bright    Diagnostic Test Results:  Labs reviewed in Epic    ASSESSMENT/PLAN:   Routine general medical examination at a health care facility  Reviewed personal and family history. Reviewed age appropriate screenings. Reviewed healthy BP and BMI ranges. Counseled on lifestyle modifications for optimal mental and physical health.  Discussed " age-appropriate health maintenance. Recommended any needed vaccinations. Continue to focus on well balanced diet and exercise.    Essential hypertension  Taking lisinopril 20 mg daily. Doing well, no side effects or concerns. He checks blood pressure at home regularly and it is well controlled. Asymptomatic. BP is usually higher in clinic due to anxiety. Will continue to monitor.   - Comprehensive metabolic panel (BMP + Alb, Alk Phos, ALT, AST, Total. Bili, TP); Future  - lisinopril (ZESTRIL) 20 MG tablet; Take 1 tablet (20 mg) by mouth daily  - Comprehensive metabolic panel (BMP + Alb, Alk Phos, ALT, AST, Total. Bili, TP)    Mixed hyperlipidemia  Chronic, due for labs. He is fasting today.   - Lipid panel reflex to direct LDL Non-fasting; Future  - Comprehensive metabolic panel (BMP + Alb, Alk Phos, ALT, AST, Total. Bili, TP); Future  - simvastatin (ZOCOR) 20 MG tablet; TAKE ONE TABLET BY MOUTH EVERY NIGHT AT BEDTIME  - Lipid panel reflex to direct LDL Non-fasting  - Comprehensive metabolic panel (BMP + Alb, Alk Phos, ALT, AST, Total. Bili, TP)    Morbid obesity (H)  Insurance will no longer cover Wegovy and he probably won't be able to stay on it due to cost. He ran out of Cytonics about 3 weeks ago. Appetite has increased since off medication and he has gained some weight. He is still exercising regularly. He does not want to see weight clinic at this time. He would like to try having refill sent in just to see what the cost would be.  - Semaglutide-Weight Management (WEGOVY) 2.4 MG/0.75ML pen; Inject 2.4 mg Subcutaneous once a week    Nonalcoholic fatty liver  Hepatic steatosis noted on CT scan 12/30/21. Mild ALT elevation in the past. Very rare alcohol use.  - Semaglutide-Weight Management (WEGOVY) 2.4 MG/0.75ML pen; Inject 2.4 mg Subcutaneous once a week     Anxiety  Chronic, overall feels well controlled. Continue present management.  - escitalopram (LEXAPRO) 10 MG tablet; Take 1 tablet (10 mg) by mouth  "daily    Paroxysmal ventricular tachycardia (H)  History of sustained monomorphic VT, s/p ablation at [facility] (12/2021). He saw cardiology on 1/21/22 and does not need further follow-up unless concerns. He feels really good. Denies palpitations, dizziness, lightheadedness, chest pain, shortness of breath.      Screening for prostate cancer  - PSA, screen; Future  - PSA, screen    Screening for diabetes mellitus  - Hemoglobin A1c; Future  - Hemoglobin A1c     Left thyroid nodule  Noted in 2012, followed with endocrinology; presumed to be a possible thyrogossal cyst based on the biopsy result. Last US in 12/2012 was stable. Plan at that time was to recheck US in 6 months but this was not done. He reports this has continued to be stable, no changes that he has noticed. Will check thyroid labs as well as obtain thyroid US.    COUNSELING:   Reviewed preventive health counseling, as reflected in patient instructions      BMI:   Estimated body mass index is 34.89 kg/m  as calculated from the following:    Height as of this encounter: 1.759 m (5' 9.25\").    Weight as of this encounter: 108 kg (238 lb).   Weight management plan: Discussed healthy diet and exercise guidelines      He reports that he has quit smoking. His smoking use included cigarettes. He has never used smokeless tobacco.            RANDY [facility], [place]  "

## 2024-01-07 ENCOUNTER — MYC REFILL (OUTPATIENT)
Dept: FAMILY MEDICINE | Facility: CLINIC | Age: 54
End: 2024-01-07
Payer: COMMERCIAL

## 2024-01-07 DIAGNOSIS — E66.01 MORBID OBESITY (H): ICD-10-CM

## 2024-01-07 DIAGNOSIS — K76.0 NONALCOHOLIC FATTY LIVER: ICD-10-CM

## 2024-01-08 NOTE — TELEPHONE ENCOUNTER
Called the pt.  Advised of below.  He thinks mounjaro is stronger and he thinks he will get better results.  His wife thinks it's better.  She is a nurse.  He said he didn't get the results he wanted from wegovy.      He says he is exercising - about 2 hours a day.  He just can't get the food under control.

## 2024-01-08 NOTE — TELEPHONE ENCOUNTER
Please call    He has been on Wegovy for weight loss but it looks like he is requesting Mounjaro which is still a weight loss medication but it is a different medication than Wegovy. Can we check to see why he is requesting Mounjaro?    Thanks!  Padmini Thompson PA-C

## 2024-01-08 NOTE — TELEPHONE ENCOUNTER
Mounjaro is a different medication and if we are switching to a new medication we would need to start at a lower dose and gradually increase. If he wants to consider this, we should have a visit to discuss - virtual okay. Please help schedule.    Padmini Thompson PA-C

## 2024-01-11 ENCOUNTER — HOSPITAL ENCOUNTER (OUTPATIENT)
Dept: ULTRASOUND IMAGING | Facility: CLINIC | Age: 54
Discharge: HOME | End: 2024-01-11
Attending: PHYSICIAN ASSISTANT | Admitting: PHYSICIAN ASSISTANT
Payer: COMMERCIAL

## 2024-01-11 DIAGNOSIS — E04.1 LEFT THYROID NODULE: ICD-10-CM

## 2024-01-11 DIAGNOSIS — R79.89 TSH ELEVATION: Primary | ICD-10-CM

## 2024-01-11 PROCEDURE — 76536 US EXAM OF HEAD AND NECK: CPT

## 2024-01-24 ENCOUNTER — VIRTUAL VISIT (OUTPATIENT)
Dept: FAMILY MEDICINE | Facility: CLINIC | Age: 54
End: 2024-01-24
Payer: COMMERCIAL

## 2024-01-24 DIAGNOSIS — E66.01 MORBID OBESITY (H): Primary | ICD-10-CM

## 2024-01-24 DIAGNOSIS — K76.0 NONALCOHOLIC FATTY LIVER: ICD-10-CM

## 2024-01-24 PROCEDURE — 99213 OFFICE O/P EST LOW 20 MIN: CPT | Mod: 95 | Performed by: PHYSICIAN ASSISTANT

## 2024-01-24 NOTE — PROGRESS NOTES
Neptali is a 53 year old who is being evaluated via a billable video visit.      How would you like to obtain your AVS? MyChart  If the video visit is dropped, the invitation should be resent by: Text to cell phone: [phone]  Will anyone else be joining your video visit? No          Assessment & Plan     Morbid obesity (H)  Nonalcoholic fatty liver  Per requirements, will have him meet with specialty MTM pharmacist. We discussed that I would be fine with him trying zepbound. Discussed realistic weight loss expectations.   - Med Therapy Management Referral      Subjective   Neptali is a 53 year old, presenting for the following health issues:  Medication Request (Would like to start Monjauro )        1/24/2024     2:49 PM   Additional Questions   Roomed by thad     History of Present Illness       Reason for visit:  New Beata consumes 2 sweetened beverage(s) daily.He exercises with enough effort to increase his heart rate 30 to 60 minutes per day.  He exercises with enough effort to increase his heart rate 5 days per week.   He is taking medications regularly.     Was on wegovy for weight loss, also history of nonalcoholic fatty liver. Due to insurance change, has been off of wegovy for past 1-2 months. Appetite has increased since off medication and he has gained some weight. He is still exercising regularly. He would like to be able to go back on medication but has heard about zepbound and would like to consider trying that.      Review of Systems  Constitutional, HEENT, cardiovascular, pulmonary, gi and gu systems are negative, except as otherwise noted.      Objective           Vitals:  No vitals were obtained today due to virtual visit.    Physical Exam   GENERAL: alert and no distress  EYES: Eyes grossly normal to inspection.  No discharge or erythema, or obvious scleral/conjunctival abnormalities.  RESP: No audible wheeze, cough, or visible cyanosis.    SKIN: Visible skin clear. No significant rash,  abnormal pigmentation or lesions.  NEURO: Cranial nerves grossly intact.  Mentation and speech appropriate for age.  PSYCH: Appropriate affect, tone, and pace of words        Video-Visit Details    Type of service:  Video Visit     Originating Location (pt. Location): Home    Distant Location (provider location):  On-site  Platform used for Video Visit: Kira  Signed Electronically by: Padmini Thompson PA-C

## 2024-01-25 ENCOUNTER — APPOINTMENT (OUTPATIENT)
Dept: CT IMAGING | Facility: CLINIC | Age: 54
End: 2024-01-25
Attending: EMERGENCY MEDICINE
Payer: COMMERCIAL

## 2024-01-25 ENCOUNTER — HOSPITAL ENCOUNTER (EMERGENCY)
Facility: CLINIC | Age: 54
Discharge: HOME | End: 2024-01-25
Attending: EMERGENCY MEDICINE | Admitting: EMERGENCY MEDICINE
Payer: COMMERCIAL

## 2024-01-25 ENCOUNTER — APPOINTMENT (OUTPATIENT)
Dept: ULTRASOUND IMAGING | Facility: CLINIC | Age: 54
End: 2024-01-25
Attending: EMERGENCY MEDICINE
Payer: COMMERCIAL

## 2024-01-25 VITALS
RESPIRATION RATE: 18 BRPM | WEIGHT: 230 LBS | TEMPERATURE: 98 F | BODY MASS INDEX: 34.86 KG/M2 | SYSTOLIC BLOOD PRESSURE: 145 MMHG | DIASTOLIC BLOOD PRESSURE: 89 MMHG | OXYGEN SATURATION: 97 % | HEART RATE: 69 BPM | HEIGHT: 68 IN

## 2024-01-25 DIAGNOSIS — K82.8 GALLBLADDER SLUDGE: ICD-10-CM

## 2024-01-25 DIAGNOSIS — K80.50 BILIARY COLIC: ICD-10-CM

## 2024-01-25 DIAGNOSIS — R10.13 EPIGASTRIC PAIN: ICD-10-CM

## 2024-01-25 LAB
ALBUMIN SERPL BCG-MCNC: 4.9 G/DL (ref 3.5–5.2)
ALBUMIN UR-MCNC: 30 MG/DL
ALP SERPL-CCNC: 67 U/L (ref 40–150)
ALT SERPL W P-5'-P-CCNC: 41 U/L (ref 0–70)
ANION GAP SERPL CALCULATED.3IONS-SCNC: 12 MMOL/L (ref 7–15)
APPEARANCE UR: CLEAR
AST SERPL W P-5'-P-CCNC: 28 U/L (ref 0–45)
ATRIAL RATE - MUSE: 92 BPM
BASOPHILS # BLD AUTO: 0 10E3/UL (ref 0–0.2)
BASOPHILS NFR BLD AUTO: 0 %
BILIRUB SERPL-MCNC: 0.7 MG/DL
BILIRUB UR QL STRIP: NEGATIVE
BUN SERPL-MCNC: 14.4 MG/DL (ref 6–20)
CALCIUM SERPL-MCNC: 9.9 MG/DL (ref 8.6–10)
CHLORIDE SERPL-SCNC: 101 MMOL/L (ref 98–107)
COLOR UR AUTO: YELLOW
CREAT SERPL-MCNC: 0.89 MG/DL (ref 0.67–1.17)
DEPRECATED HCO3 PLAS-SCNC: 28 MMOL/L (ref 22–29)
DIASTOLIC BLOOD PRESSURE - MUSE: NORMAL MMHG
EGFRCR SERPLBLD CKD-EPI 2021: >90 ML/MIN/1.73M2
EOSINOPHIL # BLD AUTO: 0 10E3/UL (ref 0–0.7)
EOSINOPHIL NFR BLD AUTO: 0 %
ERYTHROCYTE [DISTWIDTH] IN BLOOD BY AUTOMATED COUNT: 12.2 % (ref 10–15)
GLUCOSE SERPL-MCNC: 114 MG/DL (ref 70–99)
GLUCOSE UR STRIP-MCNC: NEGATIVE MG/DL
HCT VFR BLD AUTO: 46.4 % (ref 40–53)
HGB BLD-MCNC: 15.6 G/DL (ref 13.3–17.7)
HGB UR QL STRIP: NEGATIVE
IMM GRANULOCYTES # BLD: 0.1 10E3/UL
IMM GRANULOCYTES NFR BLD: 1 %
INTERPRETATION ECG - MUSE: NORMAL
KETONES UR STRIP-MCNC: NEGATIVE MG/DL
LEUKOCYTE ESTERASE UR QL STRIP: NEGATIVE
LIPASE SERPL-CCNC: 36 U/L (ref 13–60)
LYMPHOCYTES # BLD AUTO: 2.3 10E3/UL (ref 0.8–5.3)
LYMPHOCYTES NFR BLD AUTO: 22 %
MCH RBC QN AUTO: 30 PG (ref 26.5–33)
MCHC RBC AUTO-ENTMCNC: 33.6 G/DL (ref 31.5–36.5)
MCV RBC AUTO: 89 FL (ref 78–100)
MONOCYTES # BLD AUTO: 0.7 10E3/UL (ref 0–1.3)
MONOCYTES NFR BLD AUTO: 7 %
MUCOUS THREADS #/AREA URNS LPF: PRESENT /LPF
NEUTROPHILS # BLD AUTO: 7.2 10E3/UL (ref 1.6–8.3)
NEUTROPHILS NFR BLD AUTO: 70 %
NITRATE UR QL: NEGATIVE
NRBC # BLD AUTO: 0 10E3/UL
NRBC BLD AUTO-RTO: 0 /100
P AXIS - MUSE: 34 DEGREES
PH UR STRIP: 6 [PH] (ref 5–7)
PLATELET # BLD AUTO: 200 10E3/UL (ref 150–450)
POTASSIUM SERPL-SCNC: 4.3 MMOL/L (ref 3.4–5.3)
PR INTERVAL - MUSE: 190 MS
PROT SERPL-MCNC: 8.2 G/DL (ref 6.4–8.3)
QRS DURATION - MUSE: 72 MS
QT - MUSE: 364 MS
QTC - MUSE: 450 MS
R AXIS - MUSE: 33 DEGREES
RBC # BLD AUTO: 5.2 10E6/UL (ref 4.4–5.9)
RBC URINE: 1 /HPF
SODIUM SERPL-SCNC: 141 MMOL/L (ref 135–145)
SP GR UR STRIP: 1.02 (ref 1–1.03)
SYSTOLIC BLOOD PRESSURE - MUSE: NORMAL MMHG
T AXIS - MUSE: 41 DEGREES
TROPONIN T SERPL HS-MCNC: <6 NG/L
UROBILINOGEN UR STRIP-MCNC: NORMAL MG/DL
VENTRICULAR RATE- MUSE: 92 BPM
WBC # BLD AUTO: 10.3 10E3/UL (ref 4–11)
WBC URINE: <1 /HPF

## 2024-01-25 PROCEDURE — 81001 URINALYSIS AUTO W/SCOPE: CPT | Performed by: EMERGENCY MEDICINE

## 2024-01-25 PROCEDURE — 76705 ECHO EXAM OF ABDOMEN: CPT

## 2024-01-25 PROCEDURE — 96374 THER/PROPH/DIAG INJ IV PUSH: CPT | Mod: 59

## 2024-01-25 PROCEDURE — 250N000011 HC RX IP 250 OP 636: Performed by: EMERGENCY MEDICINE

## 2024-01-25 PROCEDURE — 250N000009 HC RX 250: Performed by: EMERGENCY MEDICINE

## 2024-01-25 PROCEDURE — 81001 URINALYSIS AUTO W/SCOPE: CPT | Performed by: STUDENT IN AN ORGANIZED HEALTH CARE EDUCATION/TRAINING PROGRAM

## 2024-01-25 PROCEDURE — 250N000013 HC RX MED GY IP 250 OP 250 PS 637: Performed by: EMERGENCY MEDICINE

## 2024-01-25 PROCEDURE — 80053 COMPREHEN METABOLIC PANEL: CPT | Performed by: EMERGENCY MEDICINE

## 2024-01-25 PROCEDURE — 93005 ELECTROCARDIOGRAM TRACING: CPT

## 2024-01-25 PROCEDURE — 36415 COLL VENOUS BLD VENIPUNCTURE: CPT | Performed by: EMERGENCY MEDICINE

## 2024-01-25 PROCEDURE — 99285 EMERGENCY DEPT VISIT HI MDM: CPT | Mod: 25

## 2024-01-25 PROCEDURE — 83690 ASSAY OF LIPASE: CPT | Performed by: EMERGENCY MEDICINE

## 2024-01-25 PROCEDURE — 74177 CT ABD & PELVIS W/CONTRAST: CPT

## 2024-01-25 PROCEDURE — 96375 TX/PRO/DX INJ NEW DRUG ADDON: CPT

## 2024-01-25 PROCEDURE — 85004 AUTOMATED DIFF WBC COUNT: CPT | Performed by: EMERGENCY MEDICINE

## 2024-01-25 PROCEDURE — 84484 ASSAY OF TROPONIN QUANT: CPT | Performed by: EMERGENCY MEDICINE

## 2024-01-25 RX ORDER — ONDANSETRON 2 MG/ML
4 INJECTION INTRAMUSCULAR; INTRAVENOUS EVERY 30 MIN PRN
Status: DISCONTINUED | OUTPATIENT
Start: 2024-01-25 | End: 2024-01-25 | Stop reason: HOSPADM

## 2024-01-25 RX ORDER — MORPHINE SULFATE 4 MG/ML
4 INJECTION, SOLUTION INTRAMUSCULAR; INTRAVENOUS ONCE
Status: COMPLETED | OUTPATIENT
Start: 2024-01-25 | End: 2024-01-25

## 2024-01-25 RX ORDER — ONDANSETRON 4 MG/1
4 TABLET, ORALLY DISINTEGRATING ORAL EVERY 8 HOURS PRN
Qty: 10 TABLET | Refills: 0 | Status: SHIPPED | OUTPATIENT
Start: 2024-01-25 | End: 2024-01-28

## 2024-01-25 RX ORDER — IOPAMIDOL 755 MG/ML
115 INJECTION, SOLUTION INTRAVASCULAR ONCE
Status: COMPLETED | OUTPATIENT
Start: 2024-01-25 | End: 2024-01-25

## 2024-01-25 RX ORDER — MAGNESIUM HYDROXIDE/ALUMINUM HYDROXICE/SIMETHICONE 120; 1200; 1200 MG/30ML; MG/30ML; MG/30ML
15 SUSPENSION ORAL ONCE
Status: COMPLETED | OUTPATIENT
Start: 2024-01-25 | End: 2024-01-25

## 2024-01-25 RX ORDER — HYDROCODONE BITARTRATE AND ACETAMINOPHEN 5; 325 MG/1; MG/1
1 TABLET ORAL EVERY 6 HOURS PRN
Qty: 12 TABLET | Refills: 0 | Status: SHIPPED | OUTPATIENT
Start: 2024-01-25 | End: 2024-01-28

## 2024-01-25 RX ADMIN — ALUMINUM HYDROXIDE, MAGNESIUM HYDROXIDE, AND SIMETHICONE 15 ML: 200; 200; 20 SUSPENSION ORAL at 15:43

## 2024-01-25 RX ADMIN — MORPHINE SULFATE 4 MG: 4 INJECTION, SOLUTION INTRAMUSCULAR; INTRAVENOUS at 17:43

## 2024-01-25 RX ADMIN — SODIUM CHLORIDE 73 ML: 9 INJECTION, SOLUTION INTRAVENOUS at 17:47

## 2024-01-25 RX ADMIN — IOPAMIDOL 115 ML: 755 INJECTION, SOLUTION INTRAVENOUS at 17:47

## 2024-01-25 RX ADMIN — FAMOTIDINE 20 MG: 10 INJECTION, SOLUTION INTRAVENOUS at 15:43

## 2024-01-25 RX ADMIN — ONDANSETRON 4 MG: 2 INJECTION INTRAMUSCULAR; INTRAVENOUS at 15:44

## 2024-01-25 ASSESSMENT — ACTIVITIES OF DAILY LIVING (ADL)
ADLS_ACUITY_SCORE: 35

## 2024-01-25 NOTE — ED TRIAGE NOTES
Pt has pain right below his rib cage and upper abdominal area   Pt has had this for 4 days   Pt states it get bad after he eats

## 2024-01-25 NOTE — ED PROVIDER NOTES
History     Chief Complaint:  Abdominal Pain       HPI   Neptali Ospina is a 53 year old male presents emergency department with 4 days of pain in his abdomen after eating especially.  He states he was able to workout yesterday without any significant pain.  He has had nausea but no vomiting.  He has had several loose stools per day but no blood noted in his stool.  No history of surgical procedures on his abdomen.  Patient states he feels a generalized fullness in his abdomen as well.  He states that the symptoms wax and wane but get acutely worse by eating.  No history of acid reflux that he knows of.  No recent prolonged travel.  No recent antibiotics.  No history of similar pain before 4 days ago.      Independent Historian:   None - Patient Only    Review of External Notes:   Progress note from 1/5/2024 where patient has history of whitecoat hypertension but blood pressure is well-controlled at home.  And patient on lisinopril.      Medications:    escitalopram (LEXAPRO) 10 MG tablet  fish oil-omega-3 fatty acids (OMEGA 3) 1000 MG capsule  fluticasone (FLONASE) 50 MCG/ACT nasal spray  lisinopril (ZESTRIL) 20 MG tablet  LORazepam (ATIVAN) 0.5 MG tablet  magnesium oxide 200 MG TABS  Multiple Vitamin (MULTI-VITAMIN) per tablet  simvastatin (ZOCOR) 20 MG tablet        Past Medical History:    Past Medical History:   Diagnosis Date    High triglycerides     HSV-1 infection 3/13/2018    DARLING (nonalcoholic steatohepatitis)     DANIELLE on CPAP     Palpitations     Paroxysmal ventricular tachycardia (H)     Strabismus     Thyroid nodule 02/2012    Vitamin D deficiency     White coat syndrome without diagnosis of hypertension 11/22/2013       Past Surgical History:    Past Surgical History:   Procedure Laterality Date    COLONOSCOPY N/A 1/27/2022    Procedure: COLONOSCOPY, WITH POLYPECTOMY using forceps;  Surgeon: Po Melendrez MD;  Location:  GI    CV HEART CATHETERIZATION WITH POSSIBLE INTERVENTION N/A  "12/10/2021    Procedure: Heart Catheterization with Possible Intervention;  Surgeon: Quyen Lomas MD;  Location:  HEART CARDIAC CATH LAB    EP ABLATION VT/PVC N/A 12/14/2021    Procedure: EP Ablation VT;  Surgeon: Zhen Saavedra MD;  Location:  HEART CARDIAC CATH LAB    PE TUBES      TONSILLECTOMY & ADENOIDECTOMY      VASECTOMY          Physical Exam   Patient Vitals for the past 24 hrs:   BP Temp Temp src Pulse Resp SpO2 Height Weight   01/25/24 1500 (!) 175/105 -- -- 104 20 98 % -- --   01/25/24 1142 (!) 189/117 98  F (36.7  C) Oral 100 16 99 % 1.727 m (5' 8\") 104.3 kg (230 lb)        Physical Exam  General: Patient is alert and normal appearing.  HEENT: Head atraumatic    Eyes: pupils equal and reactive. Conjunctiva clear   Nares: patent   Oropharynx: no lesions, uvula midline, no palatal draping, normal voice, no trismus  Neck: Supple without lymphadenopathy, no meningismus  Chest: Heart regular rate and rhythm.   Lungs: Equal clear to auscultation with no wheeze or rales  Abdomen: Soft, generalized tenderness to palpation greatest in the mid epigastric region with no rebound or guarding nondistended, normal bowel sounds  Back: No costovertebral angle tenderness, no midline C, T or L spine tenderness  Neuro: Grossly nonfocal, normal speech, strength equal bilaterally, CN 2-12 intact  Extremities: No deformities, equal radial and DP pulses. No clubbing, cyanosis.  No edema  Skin: Warm and dry with no rash.       Emergency Department Course   ECG  ECG results from 01/25/24   EKG 12-lead, tracing only     Value    Systolic Blood Pressure     Diastolic Blood Pressure     Ventricular Rate 92    Atrial Rate 92    NC Interval 190    QRS Duration 72        QTc 450    P Axis 34    R AXIS 33    T Axis 41    Interpretation ECG      Sinus rhythm  Anteroseptal infarct , age undetermined  Abnormal ECG  When compared with ECG of 14-DEC-2021 17:30,  Premature ventricular complexes are no longer " Present  Anteroseptal infarct is now Present           Imaging:  CT Abdomen Pelvis w Contrast   Final Result   IMPRESSION:    1.  Mild fatty infiltration of the liver.   2.  Sigmoid diverticulosis.   3.  Prostatic hypertrophy.   4.  No findings to account for the patient's abdominal pain.      US Abdomen Limited (RUQ)   Final Result   IMPRESSION:      1.  Gallbladder contains sludge and/or tiny calculi. No evidence of acute cholecystitis.      2.  No intrahepatic biliary ductal dilation. Common bile duct is not well visualized.      3.  Fatty liver.             Laboratory:  Labs Ordered and Resulted from Time of ED Arrival to Time of ED Departure   ROUTINE UA WITH MICROSCOPIC REFLEX TO CULTURE - Abnormal       Result Value    Color Urine Yellow      Appearance Urine Clear      Glucose Urine Negative      Bilirubin Urine Negative      Ketones Urine Negative      Specific Gravity Urine 1.022      Blood Urine Negative      pH Urine 6.0      Protein Albumin Urine 30 (*)     Urobilinogen Urine Normal      Nitrite Urine Negative      Leukocyte Esterase Urine Negative      Mucus Urine Present (*)     RBC Urine 1      WBC Urine <1     COMPREHENSIVE METABOLIC PANEL - Abnormal    Sodium 141      Potassium 4.3      Carbon Dioxide (CO2) 28      Anion Gap 12      Urea Nitrogen 14.4      Creatinine 0.89      GFR Estimate >90      Calcium 9.9      Chloride 101      Glucose 114 (*)     Alkaline Phosphatase 67      AST 28      ALT 41      Protein Total 8.2      Albumin 4.9      Bilirubin Total 0.7     LIPASE - Normal    Lipase 36     TROPONIN T, HIGH SENSITIVITY - Normal    Troponin T, High Sensitivity <6     CBC WITH PLATELETS AND DIFFERENTIAL    WBC Count 10.3      RBC Count 5.20      Hemoglobin 15.6      Hematocrit 46.4      MCV 89      MCH 30.0      MCHC 33.6      RDW 12.2      Platelet Count 200      % Neutrophils 70      % Lymphocytes 22      % Monocytes 7      % Eosinophils 0      % Basophils 0      % Immature Granulocytes 1       NRBCs per 100 WBC 0      Absolute Neutrophils 7.2      Absolute Lymphocytes 2.3      Absolute Monocytes 0.7      Absolute Eosinophils 0.0      Absolute Basophils 0.0      Absolute Immature Granulocytes 0.1      Absolute NRBCs 0.0          Procedures   None    Emergency Department Course & Assessments:             Interventions:  Medications   ondansetron (ZOFRAN) injection 4 mg (4 mg Intravenous $Given 1/25/24 1544)   ondansetron (ZOFRAN) injection 4 mg (has no administration in time range)   famotidine (PEPCID) injection 20 mg (20 mg Intravenous $Given 1/25/24 1543)   alum & mag hydroxide-simethicone (MAALOX) suspension 15 mL (15 mLs Oral $Given 1/25/24 1543)   morphine (PF) injection 4 mg (4 mg Intravenous $Given 1/25/24 1743)   Saline (73 mLs As instructed $Given 1/25/24 1747)   iopamidol (ISOVUE-370) solution 115 mL (115 mLs Intravenous $Given 1/25/24 1747)        Assessments:  1525 patient seen and examined by me  1700 patient updated and reexam  Patient updated on the findings and the plan    Independent Interpretation (X-rays, CTs, rhythm strip):  CT abdomen pelvis with no obstruction or free air    Consultations/Discussion of Management or Tests:  None        Social Determinants of Health affecting care:   None    Disposition:  The patient was discharged.     Impression & Plan    CMS Diagnoses: None      Medical Decision Making:  Neptali Ospina is a 53 year old male who presents for evaluation of abdominal pain in the epigastric and right quadrant.  This patient has symptoms consistent with gallstones and biliary colic.  Ultrasound has confirmed the presence of gallstones and gallbladder sludge but no evidence of cholecystitis.  Patient is afebrile.  Mild leukocytosis.  There is no clinical, laboratory, or ultrasound evidence of choledocholithiasis, gallstone pancreatitis, or ascending cholangitis.  I doubt perforated ulcer, diverticulitis, colitis.  Certainly also may have component of GERD or  gastritis.  The patient will be prescribed analgesics. The patient was educated to avoid fatty foods.  The patient was told to return to ED for increasing pain, vomiting, chills, sweats, or fever.  Follow up with general surgery is indicated for outpatient consultation, this may be arranged as soon as able.  See primary care doctor this week for recheck.  Also recommend follow-up with GI.  Patient agreed with this plan.  Return precautions if symptoms further declare themselves or fail to improve were discussed.  Patient is agreeable with this plan and all questions addressed.        Diagnosis:    ICD-10-CM    1. Epigastric pain  R10.13       2. Gallbladder sludge  K82.8       3. Biliary colic  K80.50            Discharge Medications:  New Prescriptions    HYDROCODONE-ACETAMINOPHEN (NORCO) 5-325 MG TABLET    Take 1 tablet by mouth every 6 hours as needed for moderate pain    OMEPRAZOLE (PRILOSEC) 20 MG DR CAPSULE    Take 1 capsule (20 mg) by mouth 2 times daily for 30 days    ONDANSETRON (ZOFRAN ODT) 4 MG ODT TAB    Take 1 tablet (4 mg) by mouth every 8 hours as needed for vomiting          1/25/2024   Larisa Rivas MD Neuner, Maria Bea, MD  01/25/24 1959

## 2024-01-26 NOTE — DISCHARGE INSTRUCTIONS
Discharge Instructions  Abdominal Pain    Abdominal pain (belly pain) can be caused by many things. Your evaluation today does not show the exact cause for your pain. Your provider today has decided that it is unlikely your pain is due to a life threatening problem, or a problem requiring surgery or hospital admission. Sometimes those problems cannot be found right away, so it is very important that you follow up as directed.  Sometimes only the changes which occur over time allow the cause of your pain to be found.    Generally, every Emergency Department visit should have a follow-up clinic visit with either a primary or a specialty clinic/provider. Please follow-up as instructed by your emergency provider today. With abdominal pain, we often recommend very close follow-up, such as the following day.    ADULTS:  Return to the Emergency Department right away if:    You get an oral temperature above 102oF or as directed by your provider.  You have blood in your stools. This may be bright red or appear as black, tarry stools.    You keep vomiting (throwing up) or cannot drink liquids.  You see blood when you vomit.   You cannot have a bowel movement or you cannot pass gas.  Your stomach gets bloated or bigger.  Your skin or the whites of your eyes look yellow.  You faint.  You have bloody, frequent or painful urination (peeing).  You have new symptoms or anything that worries you.    CHILDREN:  Return to the Emergency Department right away if your child has any of the above-listed symptoms or the following:    Pushes your hand away or screams/cries when his/her belly is touched.  You notice your child is very fussy or weak.  Your child is very tired and is too tired to eat or drink.  Your child is dehydrated.  Signs of dehydration can be:  Significant change in the amount of wet diapers/urine.  Your infant or child starts to have dry mouth and lips, or no saliva (spit) or tears.    PREGNANT WOMEN:  Return to the  Emergency Department right away if you have any of the above-listed symptoms or the following:    You have bleeding, leaking fluid or passing tissue from the vagina.  You have worse pain or cramping, or pain in your shoulder or back.  You have vomiting that will not stop.  You have a temperature of 100oF or more.  Your baby is not moving as much as usual.  You faint.  You get a bad headache with or without eye problems and abdominal pain.  You have a seizure.  You have unusual discharge from your vagina and abdominal pain.    Abdominal pain is pretty common during pregnancy.  Your pain may or may not be related to your pregnancy. You should follow-up closely with your OB provider so they can evaluate you and your baby.  Until you follow-up with your regular provider, do the following:     Avoid sex and do not put anything in your vagina.  Drink clear fluids.  Only take medications approved by your provider.    MORE INFORMATION:    Appendicitis:  A possible cause of abdominal pain in any person who still has their appendix is acute appendicitis. Appendicitis is often hard to diagnose.  Testing does not always rule out early appendicitis or other causes of abdominal pain. Close follow-up with your provider and re-evaluations may be needed to figure out the reason for your abdominal pain.    Follow-up:  It is very important that you make an appointment with your clinic and go to the appointment.  If you do not follow-up with your primary provider, it may result in missing an important development which could result in permanent injury or disability and/or lasting pain.  If there is any problem keeping your appointment, call your provider or return to the Emergency Department.    Medications:  Take your medications as directed by your provider today.  Before using over-the-counter medications, ask your provider and make sure to take the medications as directed.  If you have any questions about medications, ask your  "provider.    Diet:  Resume your normal diet as much as possible, but do not eat fried, fatty or spicy foods while you have pain.  Do not drink alcohol or have caffeine.  Do not smoke tobacco.    Probiotics: If you have been given an antibiotic, you may want to also take a probiotic pill or eat yogurt with live cultures. Probiotics have \"good bacteria\" to help your intestines stay healthy. Studies have shown that probiotics help prevent diarrhea (loose stools) and other intestine problems (including C. diff infection) when you take antibiotics. You can buy these without a prescription in the pharmacy section of the store.     If you were given a prescription for medicine here today, be sure to read all of the information (including the package insert) that comes with your prescription.  This will include important information about the medicine, its side effects, and any warnings that you need to know about.  The pharmacist who fills the prescription can provide more information and answer questions you may have about the medicine.  If you have questions or concerns that the pharmacist cannot address, please call or return to the Emergency Department.       Remember that you can always come back to the Emergency Department if you are not able to see your regular provider in the amount of time listed above, if you get any new symptoms, or if there is anything that worries you.  Discharge Instructions  Biliary Colic    You have been seen today for biliary colic. Biliary colic is the pain that happens when gallstones block the normal flow of bile from the gallbladder.  It usually is a steady or crampy pain in the upper abdomen (belly), most often under the right side of the rib cage where the gallbladder is. Sometimes you get pain from the gallbladder in your back or shoulder. It is common to have nausea (sick to stomach) and vomiting (throwing up) with biliary colic.    Bile is a liquid the body makes to help with " digesting fat.  It is made by the liver and stored in the gallbladder and released from the gall bladder when you eat fatty foods. Gallstones can form for a variety of reasons. Risk factors for gallstones include being female, having a family history of gallstones, being older, being pregnant or having been pregnant, having diabetes, having rapid weight loss, and others.      Once gallstones form, surgeons usually tell you to have your gallbladder removed. There is medicine that can dissolve gallstones, but it can be unpleasant to take, and gallstones tend to come back when you quit taking the medicine. Your regular provider can help decide on the right treatment for you, and may refer you to a surgeon to discuss whether surgery is right in your case.     Complications of gallstones include infection, jaundice, inflammation of the pancreas, and rupture of the gallbladder. One of these complications will happen to about one out of every four patients with gallstones over the next 10-20 years if they are not treated.       Generally, every Emergency Department visit should have a follow-up clinic visit with either a primary or a specialty clinic/provider. Please follow-up as instructed by your emergency provider today.    Return to the Emergency Department if you develop:  Fever greater than 100.5 F.   Persistent nausea and vomiting.  Pain that will not go away with the medicines you were given here.  Yellow skin or eye color (jaundice).  Other new concerning symptoms.    What can I do to help myself?  Eat regular meals at least three times a day, to make the gallbladder empty before it gets too full.  Avoid fried or fatty foods.  Drink plenty of clear fluids.  Take over-the-counter or prescribed pain medications as recommended by your provider.      If you were given a prescription for medicine here today, be sure to read all of the information (including the package insert) that comes with your prescription.  This  will include important information about the medicine, its side effects, and any warnings that you need to know about.  The pharmacist who fills the prescription can provide more information and answer questions you may have about the medicine.  If you have questions or concerns that the pharmacist cannot address, please call or return to the Emergency Department.     Remember that you can always come back to the Emergency Department if you are not able to see your regular provider in the amount of time listed above, if you get any new symptoms, or if there is anything that worries you.

## 2024-01-29 ENCOUNTER — TELEPHONE (OUTPATIENT)
Dept: SURGERY | Facility: CLINIC | Age: 54
End: 2024-01-29

## 2024-01-29 ENCOUNTER — OFFICE VISIT (OUTPATIENT)
Dept: SURGERY | Facility: CLINIC | Age: 54
End: 2024-01-29
Payer: COMMERCIAL

## 2024-01-29 VITALS
OXYGEN SATURATION: 98 % | HEART RATE: 107 BPM | HEIGHT: 68 IN | WEIGHT: 235 LBS | DIASTOLIC BLOOD PRESSURE: 82 MMHG | BODY MASS INDEX: 35.61 KG/M2 | RESPIRATION RATE: 16 BRPM | SYSTOLIC BLOOD PRESSURE: 142 MMHG

## 2024-01-29 DIAGNOSIS — K81.9 CHOLECYSTITIS: Primary | ICD-10-CM

## 2024-01-29 PROCEDURE — 99205 OFFICE O/P NEW HI 60 MIN: CPT | Performed by: SURGERY

## 2024-01-29 RX ORDER — ONDANSETRON 4 MG/1
TABLET, FILM COATED ORAL
COMMUNITY
Start: 2024-01-25

## 2024-01-29 NOTE — TELEPHONE ENCOUNTER
Type of surgery: Robotic assisted lap mery  Location of surgery: [facility]  Date and time of surgery: 2/6/24 at 1:30pm  Surgeon: Dr. Jorge Lowery  Pre-Op Appt Date: patient to schedule  Post-Op Appt Date: patient to schedule  Packet sent out: Yes  Pre-cert/Authorization completed:  Not Applicable  Date: 1/29/24

## 2024-01-30 ENCOUNTER — TRANSFERRED RECORDS (OUTPATIENT)
Dept: HEALTH INFORMATION MANAGEMENT | Facility: CLINIC | Age: 54
End: 2024-01-30
Payer: COMMERCIAL

## 2024-02-01 PROBLEM — H40.003 GLAUCOMA SUSPECT OF BOTH EYES: Status: ACTIVE | Noted: 2024-02-01

## 2024-02-01 NOTE — PROGRESS NOTES
General surgery clinic note    Had the pleasure to meet with Mr. Ospina to discuss surgical care of his biliary colic secondary to cholelithiasis.  He recently presented to the emergency room with severe upper abdominal pain that occur in the postprandial state.  The pain wrap bilaterally all the way to his back.  He had never experienced pain like that.  After receiving some pain medication in the emergency room the pain subsided and has not returned.  He has been watching his diet.    On review of studies he had ultrasound of the gallbladder that showed sludge.  Steatosis.    On exam he still sore across the upper abdomen.    Biliary colic secondary to cholelithiasis/sludge.    I discussed with him the rationale for laparoscopic cholecystectomy.  The risk, benefits, hopeful outcomes, possible complications of laparoscopic cholecystectomy were explained in detail.  He understands and would like to proceed in the near future.    Total encounter time 60 minutes, more than half spent in counseling, review of data, and coordination of care.

## 2024-02-05 ENCOUNTER — ANESTHESIA EVENT (OUTPATIENT)
Dept: SURGERY | Facility: CLINIC | Age: 54
End: 2024-02-05
Payer: COMMERCIAL

## 2024-02-06 ENCOUNTER — ANESTHESIA (OUTPATIENT)
Dept: SURGERY | Facility: CLINIC | Age: 54
End: 2024-02-06
Payer: COMMERCIAL

## 2024-02-06 ENCOUNTER — HOSPITAL ENCOUNTER (OUTPATIENT)
Facility: CLINIC | Age: 54
Discharge: HOME | End: 2024-02-06
Attending: SURGERY | Admitting: SURGERY
Payer: COMMERCIAL

## 2024-02-06 ENCOUNTER — APPOINTMENT (OUTPATIENT)
Dept: SURGERY | Facility: PHYSICIAN GROUP | Age: 54
End: 2024-02-06
Payer: COMMERCIAL

## 2024-02-06 VITALS
RESPIRATION RATE: 14 BRPM | OXYGEN SATURATION: 95 % | DIASTOLIC BLOOD PRESSURE: 81 MMHG | SYSTOLIC BLOOD PRESSURE: 142 MMHG | BODY MASS INDEX: 34.61 KG/M2 | HEART RATE: 78 BPM | WEIGHT: 233.7 LBS | TEMPERATURE: 98 F | HEIGHT: 69 IN

## 2024-02-06 DIAGNOSIS — G89.18 POSTOPERATIVE PAIN: Primary | ICD-10-CM

## 2024-02-06 PROCEDURE — 88304 TISSUE EXAM BY PATHOLOGIST: CPT | Mod: TC | Performed by: SURGERY

## 2024-02-06 PROCEDURE — 250N000011 HC RX IP 250 OP 636: Performed by: ANESTHESIOLOGY

## 2024-02-06 PROCEDURE — 272N000001 HC OR GENERAL SUPPLY STERILE: Performed by: SURGERY

## 2024-02-06 PROCEDURE — 47562 LAPAROSCOPIC CHOLECYSTECTOMY: CPT | Mod: AS | Performed by: PHYSICIAN ASSISTANT

## 2024-02-06 PROCEDURE — 710N000012 HC RECOVERY PHASE 2, PER MINUTE: Performed by: SURGERY

## 2024-02-06 PROCEDURE — 250N000011 HC RX IP 250 OP 636: Performed by: SURGERY

## 2024-02-06 PROCEDURE — 258N000003 HC RX IP 258 OP 636: Performed by: ANESTHESIOLOGY

## 2024-02-06 PROCEDURE — 360N000080 HC SURGERY LEVEL 7, PER MIN: Performed by: SURGERY

## 2024-02-06 PROCEDURE — 250N000025 HC SEVOFLURANE, PER MIN: Performed by: SURGERY

## 2024-02-06 PROCEDURE — 999N000141 HC STATISTIC PRE-PROCEDURE NURSING ASSESSMENT: Performed by: SURGERY

## 2024-02-06 PROCEDURE — 250N000009 HC RX 250: Performed by: NURSE ANESTHETIST, CERTIFIED REGISTERED

## 2024-02-06 PROCEDURE — 250N000009 HC RX 250: Performed by: ANESTHESIOLOGY

## 2024-02-06 PROCEDURE — 710N000009 HC RECOVERY PHASE 1, LEVEL 1, PER MIN: Performed by: SURGERY

## 2024-02-06 PROCEDURE — 47562 LAPAROSCOPIC CHOLECYSTECTOMY: CPT | Performed by: SURGERY

## 2024-02-06 PROCEDURE — 250N000011 HC RX IP 250 OP 636: Performed by: NURSE ANESTHETIST, CERTIFIED REGISTERED

## 2024-02-06 PROCEDURE — P9045 ALBUMIN (HUMAN), 5%, 250 ML: HCPCS | Mod: JZ | Performed by: ANESTHESIOLOGY

## 2024-02-06 PROCEDURE — 370N000017 HC ANESTHESIA TECHNICAL FEE, PER MIN: Performed by: SURGERY

## 2024-02-06 RX ORDER — CEFAZOLIN SODIUM/WATER 2 G/20 ML
2 SYRINGE (ML) INTRAVENOUS SEE ADMIN INSTRUCTIONS
Status: DISCONTINUED | OUTPATIENT
Start: 2024-02-06 | End: 2024-02-06 | Stop reason: HOSPADM

## 2024-02-06 RX ORDER — SENNA AND DOCUSATE SODIUM 50; 8.6 MG/1; MG/1
1-2 TABLET, FILM COATED ORAL 2 TIMES DAILY PRN
Qty: 15 TABLET | Refills: 0 | Status: SHIPPED | OUTPATIENT
Start: 2024-02-06

## 2024-02-06 RX ORDER — HYDROMORPHONE HCL IN WATER/PF 6 MG/30 ML
0.4 PATIENT CONTROLLED ANALGESIA SYRINGE INTRAVENOUS EVERY 5 MIN PRN
Status: DISCONTINUED | OUTPATIENT
Start: 2024-02-06 | End: 2024-02-06 | Stop reason: HOSPADM

## 2024-02-06 RX ORDER — ONDANSETRON 2 MG/ML
4 INJECTION INTRAMUSCULAR; INTRAVENOUS EVERY 30 MIN PRN
Status: DISCONTINUED | OUTPATIENT
Start: 2024-02-06 | End: 2024-02-06 | Stop reason: HOSPADM

## 2024-02-06 RX ORDER — CEFAZOLIN SODIUM/WATER 2 G/20 ML
2 SYRINGE (ML) INTRAVENOUS
Status: COMPLETED | OUTPATIENT
Start: 2024-02-06 | End: 2024-02-06

## 2024-02-06 RX ORDER — HYDROCODONE BITARTRATE AND ACETAMINOPHEN 5; 325 MG/1; MG/1
1-2 TABLET ORAL EVERY 6 HOURS PRN
Qty: 10 TABLET | Refills: 0 | Status: SHIPPED | OUTPATIENT
Start: 2024-02-06 | End: 2024-02-09

## 2024-02-06 RX ORDER — FENTANYL CITRATE 0.05 MG/ML
50 INJECTION, SOLUTION INTRAMUSCULAR; INTRAVENOUS EVERY 5 MIN PRN
Status: DISCONTINUED | OUTPATIENT
Start: 2024-02-06 | End: 2024-02-06 | Stop reason: HOSPADM

## 2024-02-06 RX ORDER — FENTANYL CITRATE 50 UG/ML
INJECTION, SOLUTION INTRAMUSCULAR; INTRAVENOUS PRN
Status: DISCONTINUED | OUTPATIENT
Start: 2024-02-06 | End: 2024-02-06

## 2024-02-06 RX ORDER — HYDROMORPHONE HCL IN WATER/PF 6 MG/30 ML
0.2 PATIENT CONTROLLED ANALGESIA SYRINGE INTRAVENOUS EVERY 5 MIN PRN
Status: DISCONTINUED | OUTPATIENT
Start: 2024-02-06 | End: 2024-02-06 | Stop reason: HOSPADM

## 2024-02-06 RX ORDER — BUPIVACAINE HYDROCHLORIDE 5 MG/ML
INJECTION, SOLUTION PERINEURAL PRN
Status: DISCONTINUED | OUTPATIENT
Start: 2024-02-06 | End: 2024-02-06 | Stop reason: HOSPADM

## 2024-02-06 RX ORDER — ONDANSETRON 4 MG/1
4 TABLET, ORALLY DISINTEGRATING ORAL EVERY 30 MIN PRN
Status: DISCONTINUED | OUTPATIENT
Start: 2024-02-06 | End: 2024-02-06 | Stop reason: HOSPADM

## 2024-02-06 RX ORDER — DEXAMETHASONE SODIUM PHOSPHATE 4 MG/ML
INJECTION, SOLUTION INTRA-ARTICULAR; INTRALESIONAL; INTRAMUSCULAR; INTRAVENOUS; SOFT TISSUE PRN
Status: DISCONTINUED | OUTPATIENT
Start: 2024-02-06 | End: 2024-02-06

## 2024-02-06 RX ORDER — FENTANYL CITRATE 0.05 MG/ML
25 INJECTION, SOLUTION INTRAMUSCULAR; INTRAVENOUS
Status: DISCONTINUED | OUTPATIENT
Start: 2024-02-06 | End: 2024-02-06 | Stop reason: HOSPADM

## 2024-02-06 RX ORDER — SODIUM CHLORIDE, SODIUM LACTATE, POTASSIUM CHLORIDE, CALCIUM CHLORIDE 600; 310; 30; 20 MG/100ML; MG/100ML; MG/100ML; MG/100ML
INJECTION, SOLUTION INTRAVENOUS CONTINUOUS PRN
Status: DISCONTINUED | OUTPATIENT
Start: 2024-02-06 | End: 2024-02-06

## 2024-02-06 RX ORDER — ONDANSETRON 2 MG/ML
INJECTION INTRAMUSCULAR; INTRAVENOUS PRN
Status: DISCONTINUED | OUTPATIENT
Start: 2024-02-06 | End: 2024-02-06

## 2024-02-06 RX ORDER — SODIUM CHLORIDE, SODIUM LACTATE, POTASSIUM CHLORIDE, CALCIUM CHLORIDE 600; 310; 30; 20 MG/100ML; MG/100ML; MG/100ML; MG/100ML
INJECTION, SOLUTION INTRAVENOUS CONTINUOUS
Status: DISCONTINUED | OUTPATIENT
Start: 2024-02-06 | End: 2024-02-06 | Stop reason: HOSPADM

## 2024-02-06 RX ORDER — HYDROCODONE BITARTRATE AND ACETAMINOPHEN 5; 325 MG/1; MG/1
1 TABLET ORAL
Status: DISCONTINUED | OUTPATIENT
Start: 2024-02-06 | End: 2024-02-06 | Stop reason: HOSPADM

## 2024-02-06 RX ORDER — PROPOFOL 10 MG/ML
INJECTION, EMULSION INTRAVENOUS PRN
Status: DISCONTINUED | OUTPATIENT
Start: 2024-02-06 | End: 2024-02-06

## 2024-02-06 RX ORDER — PROPOFOL 10 MG/ML
INJECTION, EMULSION INTRAVENOUS CONTINUOUS PRN
Status: DISCONTINUED | OUTPATIENT
Start: 2024-02-06 | End: 2024-02-06

## 2024-02-06 RX ORDER — LIDOCAINE HYDROCHLORIDE 20 MG/ML
INJECTION, SOLUTION INFILTRATION; PERINEURAL PRN
Status: DISCONTINUED | OUTPATIENT
Start: 2024-02-06 | End: 2024-02-06

## 2024-02-06 RX ORDER — DEXMEDETOMIDINE HYDROCHLORIDE 4 UG/ML
INJECTION, SOLUTION INTRAVENOUS PRN
Status: DISCONTINUED | OUTPATIENT
Start: 2024-02-06 | End: 2024-02-06

## 2024-02-06 RX ORDER — FENTANYL CITRATE 0.05 MG/ML
25 INJECTION, SOLUTION INTRAMUSCULAR; INTRAVENOUS EVERY 5 MIN PRN
Status: DISCONTINUED | OUTPATIENT
Start: 2024-02-06 | End: 2024-02-06 | Stop reason: HOSPADM

## 2024-02-06 RX ADMIN — ALBUMIN HUMAN: 0.05 INJECTION, SOLUTION INTRAVENOUS at 16:13

## 2024-02-06 RX ADMIN — PHENYLEPHRINE HYDROCHLORIDE 0.4 MCG/KG/MIN: 10 INJECTION INTRAVENOUS at 15:22

## 2024-02-06 RX ADMIN — PROPOFOL 250 MG: 10 INJECTION, EMULSION INTRAVENOUS at 14:48

## 2024-02-06 RX ADMIN — FENTANYL CITRATE 100 MCG: 50 INJECTION INTRAMUSCULAR; INTRAVENOUS at 14:48

## 2024-02-06 RX ADMIN — SODIUM CHLORIDE, POTASSIUM CHLORIDE, SODIUM LACTATE AND CALCIUM CHLORIDE: 600; 310; 30; 20 INJECTION, SOLUTION INTRAVENOUS at 14:39

## 2024-02-06 RX ADMIN — HYDROMORPHONE HYDROCHLORIDE 0.5 MG: 1 INJECTION, SOLUTION INTRAMUSCULAR; INTRAVENOUS; SUBCUTANEOUS at 16:33

## 2024-02-06 RX ADMIN — PHENYLEPHRINE HYDROCHLORIDE 100 MCG: 10 INJECTION INTRAVENOUS at 15:16

## 2024-02-06 RX ADMIN — PHENYLEPHRINE HYDROCHLORIDE 200 MCG: 10 INJECTION INTRAVENOUS at 15:18

## 2024-02-06 RX ADMIN — DEXMEDETOMIDINE HYDROCHLORIDE 12 MCG: 200 INJECTION INTRAVENOUS at 15:03

## 2024-02-06 RX ADMIN — PROPOFOL 30 MCG/KG/MIN: 10 INJECTION, EMULSION INTRAVENOUS at 15:02

## 2024-02-06 RX ADMIN — SUGAMMADEX 220 MG: 100 INJECTION, SOLUTION INTRAVENOUS at 16:38

## 2024-02-06 RX ADMIN — DEXAMETHASONE SODIUM PHOSPHATE 4 MG: 4 INJECTION, SOLUTION INTRA-ARTICULAR; INTRALESIONAL; INTRAMUSCULAR; INTRAVENOUS; SOFT TISSUE at 15:06

## 2024-02-06 RX ADMIN — Medication 2 G: at 14:48

## 2024-02-06 RX ADMIN — MIDAZOLAM 2 MG: 1 INJECTION INTRAMUSCULAR; INTRAVENOUS at 14:39

## 2024-02-06 RX ADMIN — ROCURONIUM BROMIDE 60 MG: 50 INJECTION, SOLUTION INTRAVENOUS at 14:48

## 2024-02-06 RX ADMIN — ONDANSETRON 4 MG: 2 INJECTION INTRAMUSCULAR; INTRAVENOUS at 16:30

## 2024-02-06 RX ADMIN — LIDOCAINE HYDROCHLORIDE 100 MG: 20 INJECTION, SOLUTION INFILTRATION; PERINEURAL at 14:48

## 2024-02-06 RX ADMIN — SODIUM CHLORIDE, POTASSIUM CHLORIDE, SODIUM LACTATE AND CALCIUM CHLORIDE: 600; 310; 30; 20 INJECTION, SOLUTION INTRAVENOUS at 16:36

## 2024-02-06 RX ADMIN — ROCURONIUM BROMIDE 20 MG: 50 INJECTION, SOLUTION INTRAVENOUS at 15:32

## 2024-02-06 RX ADMIN — PHENYLEPHRINE HYDROCHLORIDE 150 MCG: 10 INJECTION INTRAVENOUS at 15:25

## 2024-02-06 ASSESSMENT — ACTIVITIES OF DAILY LIVING (ADL)
ADLS_ACUITY_SCORE: 35

## 2024-02-06 NOTE — ANESTHESIA PREPROCEDURE EVALUATION
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  see Endo    Vitamin D deficiency     White coat syndrome without diagnosis of hypertension 2013    Home readings of 106/65       Past Surgical History:   Procedure Laterality Date    COLONOSCOPY N/A 2022    Procedure: COLONOSCOPY, WITH POLYPECTOMY using forceps;  Surgeon: Po Melendrez MD;  Location:  GI    CV HEART CATHETERIZATION WITH POSSIBLE INTERVENTION N/A 12/10/2021    Procedure: Heart Catheterization with Possible Intervention;  Surgeon: Quyen Lomas MD;  Location:  HEART CARDIAC CATH LAB    EP ABLATION VT/PVC N/A 2021    Procedure: EP Ablation VT;  Surgeon: Zhen Saavedra MD;  Location:  HEART CARDIAC CATH LAB    PE TUBES      TONSILLECTOMY & ADENOIDECTOMY      VASECTOMY        No Known Allergies   Social History     Tobacco Use    Smoking status: Former     Types: Cigarettes    Smokeless tobacco: Never    Tobacco comments:     Quit smoking    Substance Use Topics    Alcohol use: Yes     Alcohol/week: 0.0 standard drinks of alcohol     Comment: very occ.      Wt Readings from Last 1 Encounters:   24 106.6 kg (235 lb)        Anesthesia Evaluation            ROS/MED HX  ENT/Pulmonary:     (+) sleep apnea, uses CPAP,                                      Neurologic:       Cardiovascular: Comment: White coat HTN  S/p RVOT VT ablation few years ago, no issues since    (+)  hypertension- -   -  - -                          Irregular Heartbeat/Palpitations,         (-) CHF, THAKUR and ICD  "  METS/Exercise Tolerance: >4 METS    Hematologic:  - neg hematologic  ROS     Musculoskeletal:       GI/Hepatic: Comment: Fatty Liver    (+)          cholecystitis/cholelithiasis, hepatitis  liver disease,       Renal/Genitourinary:       Endo:     (+)          thyroid problem,     Obesity,       Psychiatric/Substance Use:       Infectious Disease:       Malignancy:       Other:            Physical Exam    Airway        Mallampati: I   TM distance: > 3 FB   Neck ROM: limited   Mouth opening: > 3 cm    Respiratory Devices and Support         Dental       (+) Completely normal teeth      Cardiovascular   cardiovascular exam normal          Pulmonary   pulmonary exam normal                OUTSIDE LABS:  CBC:   Lab Results   Component Value Date    WBC 10.3 01/25/2024    WBC 15.7 (H) 12/30/2021    HGB 15.6 01/25/2024    HGB 14.9 12/30/2021    HCT 46.4 01/25/2024    HCT 46.3 12/30/2021     01/25/2024     12/30/2021     BMP:   Lab Results   Component Value Date     01/25/2024     01/05/2024    POTASSIUM 4.3 01/25/2024    POTASSIUM 4.3 01/05/2024    CHLORIDE 101 01/25/2024    CHLORIDE 106 01/05/2024    CO2 28 01/25/2024    CO2 27 01/05/2024    BUN 14.4 01/25/2024    BUN 17.7 01/05/2024    CR 0.89 01/25/2024    CR 0.90 01/05/2024     (H) 01/25/2024     (H) 01/05/2024     COAGS: No results found for: \"PTT\", \"INR\", \"FIBR\"  POC: No results found for: \"BGM\", \"HCG\", \"HCGS\"  HEPATIC:   Lab Results   Component Value Date    ALBUMIN 4.9 01/25/2024    PROTTOTAL 8.2 01/25/2024    ALT 41 01/25/2024    AST 28 01/25/2024    ALKPHOS 67 01/25/2024    BILITOTAL 0.7 01/25/2024     OTHER:   Lab Results   Component Value Date    LACT 1.9 12/30/2021    A1C 5.2 01/05/2024    TD 9.9 01/25/2024    MAG 2.0 12/09/2021    LIPASE 36 01/25/2024    TSH 4.43 (H) 01/05/2024    T4 1.06 01/05/2024       Anesthesia Plan    ASA Status:  2       Anesthesia Type: General.     - Airway: ETT   Induction: Intravenous, " "Propofol.   Maintenance: Balanced.   Techniques and Equipment:     - Airway: Video-Laryngoscope       Consents    Anesthesia Plan(s) and associated risks, benefits, and realistic alternatives discussed. Questions answered and patient/representative(s) expressed understanding.     - Discussed:     - Discussed with:  Patient            Postoperative Care    Pain management: IV analgesics, Multi-modal analgesia.   PONV prophylaxis: Ondansetron (or other 5HT-3), Dexamethasone or Solumedrol, Background Propofol Infusion     Comments:               Aubrey Rojas, DO, DO    I have reviewed the pertinent notes and labs in the chart from the past 30 days and (re)examined the patient.  Any updates or changes from those notes are reflected in this note.              # Obesity: Estimated body mass index is 35.73 kg/m  as calculated from the following:    Height as of 1/29/24: 1.727 m (5' 8\").    Weight as of 1/29/24: 106.6 kg (235 lb).      "

## 2024-02-06 NOTE — BRIEF OP NOTE
[facility]    Brief Operative Note    Pre-operative diagnosis: Cholecystitis [K81.9]  Post-operative diagnosis Same as pre-operative diagnosis    Procedure: CHOLECYSTECTOMY, ROBOT-ASSISTED, LAPAROSCOPIC, N/A - Abdomen    Surgeon: Surgeon(s) and Role:     * Jorge Lowery MD - Primary     * Audi Shay PA-C - Assisting  Anesthesia: General   Estimated Blood Loss: Less than 10 ml    Drains: None  Specimens:   ID Type Source Tests Collected by Time Destination   1 : Gallblader Tissue Gallbladder SURGICAL PATHOLOGY EXAM Jorge Lowery MD 2/6/2024  4:23 PM      Findings:   None.  Complications: None.  Implants: * No implants in log *

## 2024-02-06 NOTE — DISCHARGE INSTRUCTIONS
**  Because you had anesthesia today and your history of sleep apnea, it is extremely important that you use your CPAP machine for the next 24 hours while napping or sleeping.**          Same Day Surgery Discharge Instructions for  Sedation and General Anesthesia     It's not unusual to feel dizzy, light-headed or faint for up to 24 hours after surgery or while taking pain medication.  If you have these symptoms: sit for a few minutes before standing and have someone assist you when you get up to walk or use the bathroom.    You should rest and relax for the next 24 hours. We recommend you make arrangements to have an adult stay with you for at least 24 hours after your discharge.  Avoid hazardous and strenuous activity.    DO NOT DRIVE any vehicle or operate mechanical equipment for 24 hours following the end of your surgery.  Even though you may feel normal, your reactions may be affected by the medication you have received.    Do not drink alcoholic beverages for 24 hours following surgery.     Slowly progress to your regular diet as you feel able. It's not unusual to feel nauseated and/or vomit after receiving anesthesia.  If you develop these symptoms, drink clear liquids (apple juice, ginger ale, broth, 7-up, etc. ) until you feel better.  If your nausea and vomiting persists for 24 hours, please notify your surgeon.      All narcotic pain medications, along with inactivity and anesthesia, can cause constipation. Drinking plenty of liquids and increasing fiber intake will help.    For any questions of a medical nature, call your surgeon.    Do not make important decisions for 24 hours.    If you had general anesthesia, you may have a sore throat for a couple of days related to the breathing tube used during surgery.  You may use Cepacol lozenges to help with this discomfort.  If it worsens or if you develop a fever, contact your surgeon.     If you feel your pain is not well managed with the pain medications  prescribed by your surgeon, please contact your surgeon's office to let them know so they can address your concerns.               [facility]  Discharge Instructions: Post-Operative Cholecystectomy    ACTIVITY  Expect to feel tired after your surgery.  This will gradually resolve.    Take frequent, short walks and increase your activity gradually.    Avoid strenuous physical activity or heavy lifting greater than 15-20 lbs. for 2-3 weeks.  You may climb stairs.  You may drive without restrictions when you are not using any prescription pain medication and feel comfortable in a car.  You may return to work/school when you are comfortable without any prescription pain medication.    WOUND CARE  You may remove your outer dressing or Band-Aids and shower 48 hours after the surgery.  Pat your incisions dry and leave them open to air.  Re-apply dressing (Band-Aids or gauze/tape) as needed for comfort or drainage.  You may have steri-strips (looks like white tape) on your incision.  You may peel off the steri-strips 2 weeks after your surgery if they have not peeled off on their own.  Do not soak your incisions in a tub or pool for 2 weeks.   Do not apply any lotions, creams, or ointments to your incisions.  A ridge under your incisions is normal and will gradually resolve.    DIET  Start with liquids, then gradually resume your regular diet as tolerated.  Avoid heavy, spicy, and greasy meals for 2-3 days.  Drink plenty of fluids to stay hydrated.  It is not uncommon to experience some loose stools or diarrhea after surgery.  This is your body's way of adapting to the bile which will slowly drain into your intestine.  A low fat diet may help with this.  This should improve over 1-2 months.    PAIN  Expect some tenderness and discomfort at the incision sites.  Use the prescribed pain medication at your discretion.  Expect gradual resolution of your pain over several days.  You may take  ibuprofen with food (unless you have been told not to) or acetaminophen/Tylenol instead of or in addition to your prescribed pain medication.  However, if you are taking Norco, do not take any additional acetaminophen/Tylenol.  Do not drink alcohol or drive while you are taking pain medications.  You may apply ice to your incisions in 20 minute intervals as needed for the next 48 hours.  After that time, consider switching to heat if you prefer.    EXPECTATIONS  Pain medications can cause constipation.  Limit use when possible.  Take an over the counter or prescribed stool softener/stimulant, such as Senna-Docusate, 1-2 times a day with plenty of water.  You may take a mild over the counter laxative, such as Miralax or a Dulcolax suppository, as needed.  You may discontinue these medications once you are having regular bowel movements and/or are no longer taking your narcotic pain medication.    You may have shoulder or upper back discomfort due to the gas used in surgery.  This is temporary and should resolve in 48-72 hours.  Short, frequent walks may help with this.  If you are unable to urinate for 8 hours or feel as though you are not emptying your bladder adequately, we recommend you seek care at an ER or Urgent Care facility for possible catheter placement.     FOLLOW UP  Our office will contact you in approximately 2-3 weeks to check on your progress and answer any questions you may have.  If you are doing well, you will not need to return for a follow up appointment.  If any concerns are identified over the phone, we will help you make an appointment to see a provider.   If you have not received a phone call, have any questions or concerns, or would like to be seen, please call us at [phone] and ask to speak with our nurse.  We are located at [address] [place] [address].    CALL OUR OFFICE [phone] IF YOU HAVE:   Chills or fever above 101 F.  Increased redness, warmth, or  drainage at your incisions.  Significant bleeding.  Pain not relieved by your pain medication or rest.  Increasing pain after the first 48 hours.  Any other concerns or questions.              ** If you have questions or concerns about your procedure,  call Dr. Lowery at [phone] **

## 2024-02-06 NOTE — ANESTHESIA PROCEDURE NOTES
Airway       Patient location during procedure: OR       Procedure Start/Stop Times: 2/6/2024 2:50 PM  Staff -        Anesthesiologist:  Shraddha Benoit MD       CRNA: Merlyn Spencer       Performed By: CRNA  Consent for Airway        Urgency: elective  Indications and Patient Condition       Indications for airway management: ludy-procedural       Induction type:intravenous       Mask difficulty assessment: 3 - difficult mask (inadequate, unstable, or two providers) +/- NMBA (two handed mask with OPA)    Final Airway Details       Final airway type: endotracheal airway       Successful airway: ETT - single and Oral  Endotracheal Airway Details        ETT size (mm): 7.5       Cuffed: yes       Successful intubation technique: video laryngoscopy       VL Blade Size: Valdez 4       Grade View of Cords: 2       Adjucts: stylet       Position: Right       Measured from: gums/teeth       Secured at (cm): 21       Bite block used: None    Post intubation assessment        Placement verified by: capnometry, equal breath sounds and chest rise        Number of attempts at approach: 1       Number of other approaches attempted: 0       Secured with: tape       Ease of procedure: easy       Dentition: Intact and Unchanged    Medication(s) Administered   Medication Administration Time: 2/6/2024 2:50 PM

## 2024-02-06 NOTE — ANESTHESIA CARE TRANSFER NOTE
Patient: Neptali Ospina    Procedure: Procedure(s):  CHOLECYSTECTOMY, ROBOT-ASSISTED, LAPAROSCOPIC       Diagnosis: Cholecystitis [K81.9]  Diagnosis Additional Information: No value filed.    Anesthesia Type:   General     Note:    Oropharynx: oropharynx clear of all foreign objects and spontaneously breathing  Level of Consciousness: awake  Oxygen Supplementation: face mask  Level of Supplemental Oxygen (L/min / FiO2): 8  Independent Airway: airway patency satisfactory and stable  Dentition: dentition unchanged  Vital Signs Stable: post-procedure vital signs reviewed and stable  Report to RN Given: handoff report given  Patient transferred to: PACU  Comments: Neuromuscular blockade reversed with sugammadex, spontaneous respirations, adequate tidal volumes, followed commands to voice, oropharynx suctioned with soft flexible catheter, extubated atraumatically, extubated with suction, airway patent after extubation.  Oxygen via facemask at 8 liters per minute to PACU. Oxygen tubing connected to wall O2 in PACU, SpO2, NiBP, and EKG monitors and alarms on and functioning, Hernando Hugger warmer connected to patient gown, report on patient's clinical status given to PACU RN, RN questions answered.         Handoff Report: Identifed the Patient, Identified the Reponsible Provider, Reviewed the pertinent medical history, Discussed the surgical course, Reviewed Intra-OP anesthesia mangement and issues during anesthesia, Set expectations for post-procedure period and Allowed opportunity for questions and acknowledgement of understanding      Vitals:  Vitals Value Taken Time   /73 02/06/24 1701   Temp     Pulse 79 02/06/24 1701   Resp 26 02/06/24 1706   SpO2 96 % 02/06/24 1706   Vitals shown include unfiled device data.    Electronically Signed By: Merlyn Spencer  February 6, 2024  5:06 PM

## 2024-02-07 NOTE — OR NURSING
SUBJECTIVE:   Romy Hurley is a 71 year old female who presents for Preventive Visit.      Are you in the first 12 months of your Medicare Part B coverage?  No    Healthy Habits:    Do you get at least three servings of calcium containing foods daily (dairy, green leafy vegetables, etc.)? yes    Amount of exercise or daily activities, outside of work: walking    Problems taking medications regularly No    Medication side effects: No    Have you had an eye exam in the past two years? no    Do you see a dentist twice per year? no    Do you have sleep apnea, excessive snoring or daytime drowsiness?has sleep medication      Ability to successfully perform activities of daily living: Yes, no assistance needed    Home safety:  none identified     Hearing impairment: No    Fall risk:  Fallen 2 or more times in the past year?: No  Any fall with injury in the past year?: No        COGNITIVE SCREEN  1) Repeat 3 items (Banana, Sunrise, Chair)    2) Clock draw: NORMAL  3) 3 item recall: Recalls 3 objects  Results: 3 items recalled: COGNITIVE IMPAIRMENT LESS LIKELY    Mini-CogTM Copyright KANCHAN Crump. Licensed by the author for use in [facility]; reprinted with permission (heidi@[facility]). All rights reserved.            Diabetes Follow-up      Patient is checking blood sugars: rarely in the am.  Results range from 100 to 110    Diabetic concerns: None     Symptoms of hypoglycemia (low blood sugar): none     Paresthesias (numbness or burning in feet) or sores: No     Date of last diabetic eye exam: due for that    Hyperlipidemia Follow-Up      Rate your low fat/cholesterol diet?: good    Taking statin?  Yes, no muscle aches from statin    Other lipid medications/supplements?:  none    Hypertension Follow-up      Outpatient blood pressures are not being checked.    Low Salt Diet: no added salt    BP Readings from Last 2 Encounters:   03/21/18 117/74   02/22/17 129/82     Hemoglobin A1C (%)   Date Value   02/22/2017  Criteria met for discharge to home.    6.4 (H)   10/26/2016 6.6 (H)     LDL Cholesterol Calculated (mg/dL)   Date Value   02/22/2017 57   03/10/2016 113 (H)     Depression and Anxiety Follow-Up    Status since last visit: Improved has been on paroxetine about 6 months now - started by cardiologist    Other associated symptoms:None    Complicating factors:     Significant life event: No     Current substance abuse: None    No flowsheet data found.  No flowsheet data found.  In the past two weeks have you had thoughts of suicide or self-harm?  No.    Do you have concerns about your personal safety or the safety of others?   No  PHQ-9  English  PHQ-9   Any Language  SURINDER-7  Suicide Assessment Five-step Evaluation and Treatment (SAFE-T)    Reviewed and updated as needed this visit by clinical staff  Tobacco  Allergies  Meds  Problems         Reviewed and updated as needed this visit by Provider  Allergies  Meds  Problems        Social History   Substance Use Topics     Smoking status: Former Smoker     Quit date: 1/1/1981     Smokeless tobacco: Never Used     Alcohol use Yes      Comment: very rarely       If you drink alcohol do you typically have >3 drinks per day or >7 drinks per week? No                        Today's PHQ-2 Score:   PHQ-2 ( 1999 [employer]) 3/21/2018 2/22/2017   Q1: Little interest or pleasure in doing things 0 0   Q2: Feeling down, depressed or hopeless 0 0   PHQ-2 Score 0 0       Do you feel safe in your environment - Yes    Do you have a Health Care Directive?: Yes: Advance Directive has been received and scanned.    Current providers sharing in care for this patient include:   Patient Care Team:  Adeola Stockton MD as PCP - General    The following health maintenance items are reviewed in Epic and correct as of today:  Health Maintenance   Topic Date Due     EYE EXAM Q1 YEAR  05/04/1947     DEPRESSION ACTION PLAN Q1 YR  05/04/1965     PHQ-9 Q6 MONTHS  [birth year]/04/1965     DEXA SCAN SCREENING (SYSTEM ASSIGNED)  05/04/2012     ADVANCE  "DIRECTIVE PLANNING Q5 YRS  05/21/2017     ASTHMA CONTROL TEST Q6 MOS  08/22/2017     A1C Q6 MO  08/22/2017     FIT Q1 YR  11/15/2017     FOOT EXAM Q1 YEAR  02/22/2018     CREATININE Q1 YEAR  02/22/2018     LIPID MONITORING Q1 YEAR  02/22/2018     MICROALBUMIN Q1 YEAR  02/22/2018     FALL RISK ASSESSMENT  02/22/2018     INFLUENZA VACCINE (SYSTEM ASSIGNED)  09/01/2018     MAMMO SCREEN Q2 YR (SYSTEM ASSIGNED)  10/26/2018     TSH W/ FREE T4 REFLEX Q2 YEAR  02/22/2019     ASTHMA ACTION PLAN Q1 YR  03/21/2019     TETANUS IMMUNIZATION (SYSTEM ASSIGNED)  07/01/2021     PNEUMOCOCCAL  Completed     HEPATITIS C SCREENING  Completed     Labs reviewed in EPIC  BP Readings from Last 3 Encounters:   03/21/18 117/74   02/22/17 129/82   06/27/16 104/75    Wt Readings from Last 3 Encounters:   03/21/18 297 lb (134.7 kg)   02/22/17 296 lb (134.3 kg)   11/08/16 299 lb 12.8 oz (136 kg)                    Pneumonia Vaccine: utd  Mammogram Screening: Patient over age 50, mutual decision to screen reflected in health maintenance.    ROS:  Constitutional, HEENT, cardiovascular, pulmonary, gi and gu systems are negative, except as otherwise noted.    OBJECTIVE:   /74  Pulse 60  Temp 96.7  F (35.9  C) (Tympanic)  Resp 18  Ht 5' 7.5\" (1.715 m)  Wt 297 lb (134.7 kg)  SpO2 93%  Breastfeeding? No  BMI 45.83 kg/m2 Estimated body mass index is 45.83 kg/(m^2) as calculated from the following:    Height as of this encounter: 5' 7.5\" (1.715 m).    Weight as of this encounter: 297 lb (134.7 kg).  EXAM:   GENERAL: healthy, alert and no distress  EYES: Eyes grossly normal to inspection, PERRL and conjunctivae and sclerae normal  HENT: ear canals and TM's normal, nose and mouth without ulcers or lesions  NECK: no adenopathy, no asymmetry, masses, or scars and thyroid normal to palpation  RESP: lungs clear to auscultation - no rales, rhonchi or wheezes  BREAST: normal without masses, tenderness or nipple discharge and no palpable axillary " masses or adenopathy  CV: regular rate and rhythm, normal S1 S2, no S3 or S4, no murmur, click or rub, no peripheral edema and peripheral pulses strong  ABDOMEN: soft, nontender, no hepatosplenomegaly, no masses and bowel sounds normal  MS: no gross musculoskeletal defects noted, no edema  SKIN: no suspicious lesions or rashes  NEURO: Normal strength and tone, mentation intact and speech normal  PSYCH: mentation appears normal, affect normal/bright    ASSESSMENT / PLAN:   1. Medicare annual wellness visit, subsequent       2. Special screening for malignant neoplasms, colon     - Fecal colorectal cancer screen (FIT); Future    3. Type 2 diabetes mellitus without complication, without long-term current use of insulin (H)     - Basic metabolic panel  - Lipid panel reflex to direct LDL Fasting  - Hemoglobin A1c  - Albumin Random Urine Quantitative with Creat Ratio  - metFORMIN (GLUCOPHAGE) 500 MG tablet; TAKE 1 TABLET(500 MG) BY MOUTH DAILY WITH DINNER  Dispense: 90 tablet; Refill: 3  - simvastatin (ZOCOR) 20 MG tablet; Take 1 tablet (20 mg) by mouth At Bedtime  Dispense: 90 tablet; Refill: 3  - OFFICE/OUTPT VISIT,EST,LEVL III    4. HTN, goal below 140/90   well controlled  - Basic metabolic panel  - valsartan-hydrochlorothiazide (DIOVAN HCT) 160-25 MG per tablet; Take 1 tablet by mouth every morning  Dispense: 90 tablet; Refill: 3  - OFFICE/OUTPT VISIT,EST,LEVL III    5. Persistent insomnia     - temazepam (RESTORIL) 15 MG capsule; TAKE 1 CAPSULE BY MOUTH NIGHTLY AS NEEDED FOR SLEEP  Dispense: 90 capsule; Refill: 3    6. Mild intermittent asthma without complication   uses the advair only when ill  - levalbuterol (XOPENEX HFA) 45 MCG/ACT Inhaler; Inhale 2 puffs into the lungs every 8 hours as needed for shortness of breath / dyspnea Needs an appt for next refills  Dispense: 1 Inhaler; Refill: 3  - fluticasone-salmeterol (ADVAIR DISKUS) 250-50 MCG/DOSE diskus inhaler; Inhale 1 puff into the lungs 2 times daily   "Dispense: 1 Inhaler; Refill: 3  - OFFICE/OUTPT VISIT,EST,LEVL III    7. Adjustment disorder with mixed anxiety and depressed mood  Feels 100% better on the SSRI (started by cardiology but I will take over prescribing now).   - PARoxetine (PAXIL) 10 MG tablet; Take 1 tablet (10 mg) by mouth At Bedtime  Dispense: 90 tablet; Refill: 3  - OFFICE/OUTPT VISIT,EST,LEVL III    End of Life Planning:  Patient currently has an advanced directive: No.  I have verified the patient's ablity to prepare an advanced directive/make health care decisions.  Literature was provided to assist patient in preparing an advanced directive.    COUNSELING:  Reviewed preventive health counseling, as reflected in patient instructions       Regular exercise       Healthy diet/nutrition        Estimated body mass index is 45.83 kg/(m^2) as calculated from the following:    Height as of this encounter: 5' 7.5\" (1.715 m).    Weight as of this encounter: 297 lb (134.7 kg).  Weight management plan: Discussed healthy diet and exercise guidelines and patient will follow up in 12 months in clinic to re-evaluate.     reports that she quit smoking about 37 years ago. She has never used smokeless tobacco.      Appropriate preventive services were discussed with this patient, including applicable screening as appropriate for cardiovascular disease, diabetes, osteopenia/osteoporosis, and glaucoma.  As appropriate for age/gender, discussed screening for colorectal cancer, prostate cancer, breast cancer, and cervical cancer. Checklist reviewing preventive services available has been given to the patient.    Reviewed patients plan of care and provided an AVS. The Basic Care Plan (routine screening as documented in Health Maintenance) for Romy meets the Care Plan requirement. This Care Plan has been established and reviewed with the Patient.    Counseling Resources:  ATP IV Guidelines  Pooled Cohorts Equation Calculator  Breast Cancer Risk Calculator  FRAX " Risk Assessment  ICSI Preventive Guidelines  Dietary Guidelines for Americans, 2010  USDA's MyPlate  ASA Prophylaxis  Lung CA Screening    Adeola Stockton MD  [facility]

## 2024-02-07 NOTE — OP NOTE
Surgeon: Jorge Lowery MD  1st Assistant: Audi Shay PA-C, The physicians assistant was medically necessary for their expertise in camera management, suctioning, suturing, and retraction.  PREOPERATIVE DIAGNOSIS: Subacute cholecystitis.   POSTOPERATIVE DIAGNOSES: Same  PROCEDURE: Robotically assisted laparoscopic cholecystectomy (D2, due to intrahepatic nature of the gland)  ANESTHESIA: General.   ESTIMATED BLOOD LOSS: Less than 5 mL.   OPERATIVE PROCEDURE: After induction of general endotracheal anesthesia, Neptali Ospina abdomen was prepped and draped in the usual sterile fashion.  With direct visualization trocar in the left upper quadrant the abdomen was entered and pneumoperitoneum was established.  A total of four 8 mm trocars were placed along the abdomen in a circumference with this epicenter being the gallbladder.  The patient had a large amount of intraperitoneal adipose tissue.  The liver was very round shape and the gallbladder appeared submerse into the liver parenchyma and surrounded with a significant layer of fatty tissue.  The gallbladder fundus was retracted cephalad and lateral and the infundibulum was retracted caudad and lateral. The peritoneum investing the triangle of Calot was incised with electrocautery and dissected bluntly. The critical view of a single cystic duct, single cystic artery was achieved and both structures were securely clipped on the patient's side, and on the gallbladder side then transected sharply. The gallbladder was detached from the liver with electrocautery and blunt dissection. The specimen was removed from the patient's abdomen and sent to pathology. The gallbladder fossa was inspected. Hemostasis was secured, we noted tiny amount of bile coming at the level of the cystic duct clipped, this was controlled by placing an additional clip distal to it.  After this there was no evidence of bile leakage. The abdomen was surveyed and no other pathology seen. The  trocars were then removed under direct visualization without evidence of bleeding. Skin was approximated with absorbable sutures. Steri-Strips and sterile dressing applied. No immediate complications.   BART JACINTO MD

## 2024-02-07 NOTE — ANESTHESIA POSTPROCEDURE EVALUATION
Patient: Neptali Ospina    Procedure: Procedure(s):  CHOLECYSTECTOMY, ROBOT-ASSISTED, LAPAROSCOPIC       Anesthesia Type:  General    Note:  Disposition: Outpatient   Postop Pain Control: Uneventful            Sign Out: Well controlled pain   PONV:    Neuro/Psych: Uneventful            Sign Out: Acceptable/Baseline neuro status   Airway/Respiratory: Uneventful            Sign Out: Acceptable/Baseline resp. status   CV/Hemodynamics: Uneventful            Sign Out: Acceptable CV status; No obvious hypovolemia; No obvious fluid overload   Other NRE:    DID A NON-ROUTINE EVENT OCCUR?            Last vitals:  Vitals Value Taken Time   /78 02/06/24 1830   Temp 36.4  C (97.6  F) 02/06/24 1730   Pulse 72 02/06/24 1837   Resp 0 02/06/24 1833   SpO2 93 % 02/06/24 1837   Vitals shown include unfiled device data.    Electronically Signed By: Shraddha Benoit MD  February 6, 2024  6:38 PM

## 2024-02-08 LAB
PATH REPORT.COMMENTS IMP SPEC: NORMAL
PATH REPORT.COMMENTS IMP SPEC: NORMAL
PATH REPORT.FINAL DX SPEC: NORMAL
PATH REPORT.GROSS SPEC: NORMAL
PATH REPORT.MICROSCOPIC SPEC OTHER STN: NORMAL
PATH REPORT.RELEVANT HX SPEC: NORMAL
PHOTO IMAGE: NORMAL

## 2024-02-08 PROCEDURE — 88304 TISSUE EXAM BY PATHOLOGIST: CPT | Mod: 26 | Performed by: PATHOLOGY

## 2024-02-15 ENCOUNTER — VIRTUAL VISIT (OUTPATIENT)
Dept: CARDIOLOGY | Facility: CLINIC | Age: 54
End: 2024-02-15
Attending: PHYSICIAN ASSISTANT
Payer: COMMERCIAL

## 2024-02-15 ENCOUNTER — TELEPHONE (OUTPATIENT)
Dept: SURGERY | Facility: CLINIC | Age: 54
End: 2024-02-15
Payer: COMMERCIAL

## 2024-02-15 VITALS — HEIGHT: 69 IN | BODY MASS INDEX: 34.07 KG/M2 | WEIGHT: 230 LBS

## 2024-02-15 DIAGNOSIS — E66.01 MORBID OBESITY (H): Primary | ICD-10-CM

## 2024-02-15 DIAGNOSIS — K76.0 NONALCOHOLIC FATTY LIVER: ICD-10-CM

## 2024-02-15 ASSESSMENT — PAIN SCALES - GENERAL: PAINLEVEL: NO PAIN (0)

## 2024-02-15 NOTE — PROGRESS NOTES
Medication Therapy Management (MTM) Encounter    ASSESSMENT:                            Medication Adherence/Access: No issues identified    Weight management: Weight unchanged, previously positive, though modest response to use of Wegovy.  Tolerated Wegovy well without notable adverse effects aside from mild constipation.  Pretreatment BMI greater than 30 kg/m  with a background of nonalcoholic fatty liver disease. Negative history of pancreatitis, medullary thyroid cancer and multiple endocrine neoplasia type 2.  Appropriate candidate for initiation of Zepbound.  Reviewed mechanism, adverse effects, monitoring, safety, administration with use of Zepbound.  Patient would benefit with increased water intake which was discussed.    For patients that are under [employer] Employee/[employer] insurance coverage, it is mandated by insurance that each qualifying patient meet with hospital based Weight Management Medication Therapy Management pharmacist to continue therapy coverage. The following patient meets the below coverage criteria and can therefore continue GLP-1/GIP agonist therapy for Weight Management:    Adult  BMI >40 with or without comorbidities   OR   BMI >30 + NAFLD*   at time of initiating GLP-1/GIP agonist therapy Approved for 29 weeks  Met Updated Initial Criteria   At least 5% weight loss of baseline body weight  Approved for 12 months      PLAN:                            Zepbound Dosing:   Start Zepbound: It is a subcutaneous injection that you inject once weekly and titrate the dose slowly over time. Make sure inject the same time each day of the week, for example Monday evenings.  Each pen is single use.  In each prescription, you will get 4 pens in each box.  You will need a new prescription for each strength of the medication.  Week 1-4: Inject 2.5 mg once weekly  Week 5-8: If tolerating, increase to 5 mg once weekly  Week 9-12: If tolerating, increase to 7.5 mg once weekly    The goal is to get to  a dose that is well tolerated and effective for you. You do not have to go up to the highest dose.    *If you are having some nausea or other side effects to where you are hesitant to move up to the next dose, stay at the same dose you are on for an additional week to see if side effect(s) improves/resolves. Make sure to take this time to hydrate and ensure you are drinking at least 64 oz water per day. If you are wanting to stay at the same dose and do not have additional refills on that prescription please reach out to the clinic.    Zepbound Administration Video: Video that was created by the .  https://www.zepbound.ESBATech/how-to-use  https://www.zepboundLagooncom/coverage-savings    Zepbound Storage and Stability:   Make sure that when you get the prescription that you store the prescription in the refrigerator until it is time to use the Zepbound pen.  Once it is time to use the Zepbound pen, you can keep the pen at room temperature and it is good for up to 21 days at room temperature.     Zepbound Common Side Effects:   Nausea, diarrhea, constipation, headache, tiredness (fatigue), dizziness, stomach upset/pain. Less commonly, Zepbound can cause low blood sugar (symptoms: shaky, dizzy, sweaty, agitation). Please reach out to the care team should you feel like this is occurring. It is important to ensure that you are eating consistent meals and not skipping meals. Ensure you are getting at least 64 oz water daily.      Follow up in May as scheduled.     SUBJECTIVE/OBJECTIVE:                          Neptali Ospina is a 53 year old male called for an initial visit. He was referred to me from [facility] insurance requirements.      Reason for visit: GLP-1/GIP agonist consult .    Allergies/ADRs: Reviewed in chart  Past Medical History: Reviewed in chart  Tobacco: He reports that he has quit smoking. His smoking use included cigarettes. He has never used smokeless tobacco.      Medication  "Adherence/Access: no issues reported    Weight management:  Phone consult to discuss initiation of Zepbound.  Patient states he historically used Wegovy, last year, had moderate success. Weight loss observed with Wegovy was around 20 lbs over roughly 6-8 months. Discontinued use due to lack of insurance coverage and frustration with limited effect. Did titrate to 2.4 mg Wegovy, only adverse effect he encountered was constipation which did not require OTC or other means to address. Notes he'd like to start Zepbound due to recent findings of NAFLD, because of this would like to make strides towards weight loss, together with global health goals.  Notes recent removal of gallbladder, symptoms have completely resolved following that.    Medication History:  Fluid/Water intake: Recognizes need for increased water intake, probably less than 20 oz daily   Diet: Has been trying to more towards higher fiber foods, bran, oatmeal, protein, limiting simple   Physical activity: Lifetime fitness every day or every other day for 90 min,45 min cardio 45 min weights     Medical History:  MEN2/Medullary Thyroid Cancer: Negative   Pancreatitis: Negative   Baseline GI symptomatology: Recent gall bladder removal, symptoms resolved. No GI adverse effects. Notes some lactose intolerance.      Wt Readings from Last 4 Encounters:   02/15/24 104.3 kg (230 lb)   02/06/24 106 kg (233 lb 11.2 oz)   01/29/24 106.6 kg (235 lb)   01/25/24 104.3 kg (230 lb)     Body Mass Index (BMI) Body mass index is 33.97 kg/m .    Today's Vitals: Ht 1.753 m (5' 9\")   Wt 104.3 kg (230 lb)   BMI 33.97 kg/m      Lab Results   Component Value Date    A1C 5.2 01/05/2024    A1C 5.4 01/24/2023    A1C 5.5 11/16/2021    A1C 5.4 11/02/2020    A1C 5.5 04/20/2016    A1C 5.3 11/22/2013    A1C 5.5 05/25/2011     Lab Results   Component Value Date    CHOL 177 01/05/2024    CHOL 180 11/02/2020     Lab Results   Component Value Date    HDL 35 01/05/2024    HDL 37 11/02/2020 "     Lab Results   Component Value Date     01/05/2024    LDL 96 11/02/2020     Lab Results   Component Value Date    TRIG 174 01/05/2024    TRIG 235 11/02/2020     Lab Results   Component Value Date    DONGO 3.1 03/09/2015     ----------------      I spent 20 minutes with this patient today. All changes were made via collaborative practice agreement with Keely Frias PA-C . A copy of the visit note was provided to the patient's provider(s).    A summary of these recommendations was sent via [employer].    Todd Carver, PharmD, BCACP  Medication Therapy Management Pharmacist  [facility]     Telemedicine Visit Details  Type of service:  Telephone visit  Start Time:  200pm  End Time:  220pm     Medication Therapy Recommendations  No medication therapy recommendations to display

## 2024-02-15 NOTE — NURSING NOTE
Is the patient currently in the state of [place]? YES    Visit mode:TELEPHONE    If the visit is dropped, the patient can be reconnected by: TELEPHONE VISIT: Phone number: [phone]    Will anyone else be joining the visit? NO  (If patient encounters technical issues they should call [phone] :[phone])    How would you like to obtain your AVS? MyChart    Are changes needed to the allergy or medication list? Pt stated no changes to allergies and Pt stated no med changes  Please remove any meds marked not taking and any flagged for removal.    Reason for visit: Consult    Wt/ht other than 24 hrs:  week ago  Pain more than one location:  no  Jo ALVARADO

## 2024-02-15 NOTE — LETTER
2/15/2024      RE: Neptali Ospina  [id number]  [place] [address]       Dear Colleague,    Thank you for the opportunity to participate in the care of your patient, Neptali Ospina, at the [facility] at . Please see a copy of my visit note below.    Medication Therapy Management (MTM) Encounter    ASSESSMENT:                            Medication Adherence/Access: No issues identified    Weight management: Weight unchanged, previously positive, though modest response to use of Wegovy.  Tolerated Wegovy well without notable adverse effects aside from mild constipation.  Pretreatment BMI greater than 30 kg/m  with a background of nonalcoholic fatty liver disease. Negative history of pancreatitis, medullary thyroid cancer and multiple endocrine neoplasia type 2.  Appropriate candidate for initiation of Zepbound.  Reviewed mechanism, adverse effects, monitoring, safety, administration with use of Zepbound.  Patient would benefit with increased water intake which was discussed.    For patients that are under [employer] insurance coverage, it is mandated by insurance that each qualifying patient meet with hospital based Weight Management Medication Therapy Management pharmacist to continue therapy coverage. The following patient meets the below coverage criteria and can therefore continue GLP-1/GIP agonist therapy for Weight Management:    Adult  BMI >40 with or without comorbidities   OR   BMI >30 + NAFLD*   at time of initiating GLP-1/GIP agonist therapy Approved for 29 weeks  Met Updated Initial Criteria   At least 5% weight loss of baseline body weight  Approved for 12 months      PLAN:                            Zepbound Dosing:   Start Zepbound: It is a subcutaneous injection that you inject once weekly and titrate the dose slowly over time. Make sure inject the same time each day of the week, for  example Monday evenings.  Each pen is single use.  In each prescription, you will get 4 pens in each box.  You will need a new prescription for each strength of the medication.  Week 1-4: Inject 2.5 mg once weekly  Week 5-8: If tolerating, increase to 5 mg once weekly  Week 9-12: If tolerating, increase to 7.5 mg once weekly    The goal is to get to a dose that is well tolerated and effective for you. You do not have to go up to the highest dose.    *If you are having some nausea or other side effects to where you are hesitant to move up to the next dose, stay at the same dose you are on for an additional week to see if side effect(s) improves/resolves. Make sure to take this time to hydrate and ensure you are drinking at least 64 oz water per day. If you are wanting to stay at the same dose and do not have additional refills on that prescription please reach out to the clinic.    Zepbound Administration Video: Video that was created by the .  https://www.zepbDejour Energy/how-to-use  https://www.zepbHireHive.[employer]/coverage-savings    Zepbound Storage and Stability:   Make sure that when you get the prescription that you store the prescription in the refrigerator until it is time to use the Zepbound pen.  Once it is time to use the Zepbound pen, you can keep the pen at room temperature and it is good for up to 21 days at room temperature.     Zepbound Common Side Effects:   Nausea, diarrhea, constipation, headache, tiredness (fatigue), dizziness, stomach upset/pain. Less commonly, Zepbound can cause low blood sugar (symptoms: shaky, dizzy, sweaty, agitation). Please reach out to the care team should you feel like this is occurring. It is important to ensure that you are eating consistent meals and not skipping meals. Ensure you are getting at least 64 oz water daily.      Follow up in May as scheduled.     SUBJECTIVE/OBJECTIVE:                          Neptali Ospina is a 53 year old male called for an  "initial visit. He was referred to me from [facility] insurance requirements.      Reason for visit: GLP-1/GIP agonist consult .    Allergies/ADRs: Reviewed in chart  Past Medical History: Reviewed in chart  Tobacco: He reports that he has quit smoking. His smoking use included cigarettes. He has never used smokeless tobacco.      Medication Adherence/Access: no issues reported    Weight management:  Phone consult to discuss initiation of Zepbound.  Patient states he historically used Wegovy, last year, had moderate success. Weight loss observed with Wegovy was around 20 lbs over roughly 6-8 months. Discontinued use due to lack of insurance coverage and frustration with limited effect. Did titrate to 2.4 mg Wegovy, only adverse effect he encountered was constipation which did not require OTC or other means to address. Notes he'd like to start Zepbound due to recent findings of NAFLD, because of this would like to make strides towards weight loss, together with global health goals.  Notes recent removal of gallbladder, symptoms have completely resolved following that.    Medication History:  Fluid/Water intake: Recognizes need for increased water intake, probably less than 20 oz daily   Diet: Has been trying to more towards higher fiber foods, bran, oatmeal, protein, limiting simple   Physical activity: Lifetime fitness every day or every other day for 90 min,45 min cardio 45 min weights     Medical History:  MEN2/Medullary Thyroid Cancer: Negative   Pancreatitis: Negative   Baseline GI symptomatology: Recent gall bladder removal, symptoms resolved. No GI adverse effects. Notes some lactose intolerance.      Wt Readings from Last 4 Encounters:   02/15/24 104.3 kg (230 lb)   02/06/24 106 kg (233 lb 11.2 oz)   01/29/24 106.6 kg (235 lb)   01/25/24 104.3 kg (230 lb)     Body Mass Index (BMI) Body mass index is 33.97 kg/m .    Today's Vitals: Ht 1.753 m (5' 9\")   Wt 104.3 kg (230 lb)   BMI 33.97 kg/m      Lab Results "   Component Value Date    A1C 5.2 01/05/2024    A1C 5.4 01/24/2023    A1C 5.5 11/16/2021    A1C 5.4 11/02/2020    A1C 5.5 04/20/2016    A1C 5.3 11/22/2013    A1C 5.5 05/25/2011     Lab Results   Component Value Date    CHOL 177 01/05/2024    CHOL 180 11/02/2020     Lab Results   Component Value Date    HDL 35 01/05/2024    HDL 37 11/02/2020     Lab Results   Component Value Date     01/05/2024    LDL 96 11/02/2020     Lab Results   Component Value Date    TRIG 174 01/05/2024    TRIG 235 11/02/2020     Lab Results   Component Value Date    CHOLHDLRATIO 3.1 03/09/2015     ----------------      I spent 20 minutes with this patient today. All changes were made via collaborative practice agreement with Keely Frias PA-C . A copy of the visit note was provided to the patient's provider(s).    A summary of these recommendations was sent via [employer].    Todd Carver, PharmD, BCACP  Medication Therapy Management Pharmacist  [facility]     Telemedicine Visit Details  Type of service:  Telephone visit  Start Time:  200pm  End Time:  220pm     Medication Therapy Recommendations  No medication therapy recommendations to display         Please do not hesitate to contact me if you have any questions/concerns.     Sincerely,     TODD CARVER [facility]

## 2024-02-15 NOTE — Clinical Note
Dayne Washington,  I am one of the clinic pharmacists with the comprehensive weight management team.  I spoke with Neptali regarding initiation of Zepbound and have plans for him to start.  I have planned follow-up with him in May.  Please let me know if you have any questions or concerns.  He did well historically with Wegovy without noted adverse effects.  Thank you, Justin

## 2024-02-15 NOTE — PATIENT INSTRUCTIONS
Recommendations from today's MTM visit:                                                    MTM (medication therapy management) is a service provided by a clinical pharmacist designed to help you get the most of out of your medicines.      Zepbound Dosing:   Start Zepbound: It is a subcutaneous injection that you inject once weekly and titrate the dose slowly over time. Make sure inject the same time each day of the week, for example Monday evenings.  Each pen is single use.  In each prescription, you will get 4 pens in each box.  You will need a new prescription for each strength of the medication.  Week 1-4: Inject 2.5 mg once weekly  Week 5-8: If tolerating, increase to 5 mg once weekly  Week 9-12: If tolerating, increase to 7.5 mg once weekly     The goal is to get to a dose that is well tolerated and effective for you. You do not have to go up to the highest dose.     *If you are having some nausea or other side effects to where you are hesitant to move up to the next dose, stay at the same dose you are on for an additional week to see if side effect(s) improves/resolves. Make sure to take this time to hydrate and ensure you are drinking at least 64 oz water per day. If you are wanting to stay at the same dose and do not have additional refills on that prescription please reach out to the clinic.     Zepbound Administration Video: Video that was created by the .  https://www.zepbound.OralWise.BadSeed/how-to-use  https://www.zepbound.OralWise.com/coverage-savings     Zepbound Storage and Stability:   Make sure that when you get the prescription that you store the prescription in the refrigerator until it is time to use the Zepbound pen.  Once it is time to use the Zepbound pen, you can keep the pen at room temperature and it is good for up to 21 days at room temperature.      Zepbound Common Side Effects:   Nausea, diarrhea, constipation, headache, tiredness (fatigue), dizziness, stomach upset/pain. Less  "commonly, Zepbound can cause low blood sugar (symptoms: shaky, dizzy, sweaty, agitation). Please reach out to the care team should you feel like this is occurring. It is important to ensure that you are eating consistent meals and not skipping meals. Ensure you are getting at least 64 oz water daily.       Follow up in May as scheduled.    It was great speaking with you today.  I value your experience and would be very thankful for your time in providing feedback in our clinic survey. In the next few days, you may receive an email or text message from [facility] [employer] with a link to a survey related to your  clinical pharmacist.\"     To schedule another MTM appointment, please call the clinic directly or you may call the MTM scheduling line at [phone].    My Clinical Pharmacist's contact information:                                                      Please feel free to contact me with any questions or concerns you have.      Todd Carver, PharmD, BCACP  Medication Therapy Management Pharmacist  [facility]    "

## 2024-02-21 ENCOUNTER — TELEPHONE (OUTPATIENT)
Dept: SURGERY | Facility: CLINIC | Age: 54
End: 2024-02-21
Payer: COMMERCIAL

## 2024-02-21 NOTE — TELEPHONE ENCOUNTER
[facility]  Post op call note - No Answer    Neptali REDDY Karlasaadia was called for an update regarding his recovery.  There was no answer and a message was left instructing the patient to call the office with any questions or concerns.     Audi Shay PA-C

## 2024-02-25 ENCOUNTER — MYC REFILL (OUTPATIENT)
Dept: FAMILY MEDICINE | Facility: CLINIC | Age: 54
End: 2024-02-25
Payer: COMMERCIAL

## 2024-02-25 DIAGNOSIS — F40.243 ANXIETY WITH FLYING: ICD-10-CM

## 2024-02-26 RX ORDER — LORAZEPAM 0.5 MG/1
1-2 TABLET ORAL EVERY 6 HOURS PRN
Qty: 8 TABLET | Refills: 0 | Status: SHIPPED | OUTPATIENT
Start: 2024-02-26

## 2024-03-12 ENCOUNTER — OFFICE VISIT (OUTPATIENT)
Dept: FAMILY MEDICINE | Facility: CLINIC | Age: 54
End: 2024-03-12
Payer: COMMERCIAL

## 2024-03-12 DIAGNOSIS — L81.4 LENTIGINES: ICD-10-CM

## 2024-03-12 DIAGNOSIS — Z12.83 ENCOUNTER FOR SCREENING FOR MALIGNANT NEOPLASM OF SKIN: ICD-10-CM

## 2024-03-12 DIAGNOSIS — L82.1 SEBORRHEIC KERATOSES: ICD-10-CM

## 2024-03-12 DIAGNOSIS — D22.9 MULTIPLE BENIGN NEVI: Primary | ICD-10-CM

## 2024-03-12 DIAGNOSIS — D18.01 CHERRY ANGIOMA: ICD-10-CM

## 2024-03-12 PROCEDURE — 99213 OFFICE O/P EST LOW 20 MIN: CPT | Mod: 24 | Performed by: NURSE PRACTITIONER

## 2024-03-12 NOTE — PATIENT INSTRUCTIONS
Patient Education       Proper skin care from [facility]:    -Eliminate harsh soaps as they strip the natural oils from the skin, often resulting in dry itchy skin ( i.e. Dial, Zest, [place])  -Use mild soaps such as Cetaphil or Dove Sensitive Skin in the shower. You do not need to use soap on arms, legs, and trunk every time you shower unless visibly soiled.   -Avoid hot or cold showers.  -After showering, lightly dry off and apply moisturizing within 2-3 minutes. This will help trap moisture in the skin.   -Aggressive use of a moisturizer at least 1-2 times a day to the entire body (including -Vanicream, Cetaphil, Aquaphor or Cerave) and moisturize hands after every washing.  -We recommend using moisturizers that come in a tub that needs to be scooped out, not a pump. This has more of an oil base. It will hold moisture in your skin much better than a water base moisturizer. The above recommended are non-pore clogging.      Wear a sunscreen with at least SPF 30 on your face, ears, neck and V of the chest daily. Wear sunscreen on other areas of the body if those areas are exposed to the sun throughout the day. Sunscreens can contain physical and/or chemical blockers. Physical blockers are less likely to clog pores, these include zinc oxide and titanium dioxide. Reapply every two hour and after swimming.     Sunscreen examples: https://www.ewg.org/sunscreen/    UV radiation  UVA radiation remains constant throughout the day and throughout the year. It is a longer wavelength than UVB and therefore penetrates deeper into the skin leading to immediate and delayed tanning, photoaging, and skin cancer. 70-80% of UVA and UVB radiation occurs between the hours of 10am-2pm.  UVB radiation  UVB radiation causes the most harmful effects and is more significant during the summer months. However, snow and ice can reflect UVB radiation leading to skin damage during the winter months as well. UVB radiation is  responsible for tanning, burning, inflammation, delayed erythema (pinkness), pigmentation (brown spots), and skin cancer.     I recommend self monthly full body exams and yearly full body exams with a dermatology provider. If you develop a new or changing lesion please follow up for examination. Most skin cancers are pink and scaly or pink and pearly. However, we do see blue/brown/black skin cancers.  Consider the ABCDEs of melanoma when giving yourself your monthly full body exam ( don't forget the groin, buttocks, feet, toes, etc). A-asymmetry, B-borders, C-color, D-diameter, E-elevation or evolving. If you see any of these changes please follow up in clinic. If you cannot see your back I recommend purchasing a hand held mirror to use with a larger wall mirror.       Checking for Skin Cancer  You can find cancer early by checking your skin each month. There are 3 kinds of skin cancer. They are melanoma, basal cell carcinoma, and squamous cell carcinoma. Doing monthly skin checks is the best way to find new marks or skin changes. Follow the instructions below for checking your skin.   The ABCDEs of checking moles for melanoma   Check your moles or growths for signs of melanoma using ABCDE:   Asymmetry: the sides of the mole or growth don t match  Border: the edges are ragged, notched, or blurred  Color: the color within the mole or growth varies  Diameter: the mole or growth is larger than 6 mm (size of a pencil eraser)  Evolving: the size, shape, or color of the mole or growth is changing (evolving is not shown in the images below)    Checking for other types of skin cancer  Basal cell carcinoma or squamous cell carcinoma have symptoms such as:     A spot or mole that looks different from all other marks on your skin  Changes in how an area feels, such as itching, tenderness, or pain  Changes in the skin's surface, such as oozing, bleeding, or scaliness  A sore that does not heal  New swelling or redness beyond  the border of a mole    Who s at risk?  Anyone can get skin cancer. But you are at greater risk if you have:   Fair skin, light-colored hair, or light-colored eyes  Many moles or abnormal moles on your skin  A history of sunburns from sunlight or tanning beds  A family history of skin cancer  A history of exposure to radiation or chemicals  A weakened immune system  If you have had skin cancer in the past, you are at risk for recurring skin cancer.   How to check your skin  Do your monthly skin checkups in front of a full-length mirror. Check all parts of your body, including your:   Head (ears, face, neck, and scalp)  Torso (front, back, and sides)  Arms (tops, undersides, upper, and lower armpits)  Hands (palms, backs, and fingers, including under the nails)  Buttocks and genitals  Legs (front, back, and sides)  Feet (tops, soles, toes, including under the nails, and between toes)  If you have a lot of moles, take digital photos of them each month. Make sure to take photos both up close and from a distance. These can help you see if any moles change over time.   Most skin changes are not cancer. But if you see any changes in your skin, call your doctor right away. Only he or she can diagnose a problem. If you have skin cancer, seeing your doctor can be the first step toward getting the treatment that could save your life.   [employer] last reviewed this educational content on 4/1/2019 2000-2020 [employer]. [address] [place] [address], [place] [address]. All rights reserved. This information is not intended as a substitute for professional medical care. Always follow your healthcare professional's instructions.       When should I call my doctor?  If you are worsening or not improving, please, contact us or seek urgent care as noted below.     Who should I call with questions (adults)?  [facility] (adult and pediatric): [phone]  [facility]  [place] (adult): [phone]  [facility] ([place], [place], [place] and [place]) [phone]  For urgent needs outside of business hours call the [facility] at [phone] and ask for the dermatology resident on call to be paged  If this is a medical emergency and you are unable to reach an ER, Call 911      If you need a prescription refill, please contact your pharmacy. Refills are approved or denied by our Physicians during normal business hours, Monday through Fridays  Per office policy, refills will not be granted if you have not been seen within the past year (or sooner depending on your child's condition)

## 2024-03-12 NOTE — PROGRESS NOTES
[facility] Dermatology Note  Encounter Date: Mar 12, 2024  Office Visit     Reviewed patients past medical history and pertinent chart review prior to patients visit today.     Dermatology Problem List:  1. DN, mild atypia, right lower back - s/p shave biopsy 9/23/2021   2.  Benign intradermal nevus on the mid chest biopsied 2/20/2023  ____________________________________________    Assessment & Plan:     # Right mid back, nevus to monitor. Photos in chart.     # Benign skin findings including: seborrheic keratoses, cherry angioma, lentigines and benign nevi.   - No further intervention required. Patient to report changes.   - Patient reassured of the benign nature of these lesions.    #Signs and Symptoms of non-melanoma skin cancer and ABCDEs of melanoma reviewed with patient. Patient encouraged to perform monthly self skin exams and educated on how to perform them. UV precautions reviewed with patient. Patient was asked about new or changing moles/lesions on body.     #Reviewed Sunscreen: Apply 20 minutes prior to going outdoors and reapply every two hours, when wet or sweating. We recommend using an SPF 30 or higher, and to use one that is water resistant.       Follow-up:  1-2 years for follow up full body skin exam, prn for new or changing lesions or new concerns    Ruth Velarde CNP  Dermatology     ____________________________________________    CC: Skin Check    HPI:  Mr. Neptali Ospina is a(n) 53 year old male who presents today as a return patient for a full body skin cancer screening. Patient has no specific concerns today.     Patient is otherwise feeling well, without additional skin concerns.     Physical Exam:  Vitals: There were no vitals taken for this visit.  SKIN: Total skin excluding the genitalia areas was performed. The exam included the head/face, neck, both arms, chest, back, abdomen, both legs, digits, mons pubis, buttock and nails.   -right mid back, 6 x 5 mm irregular  shaped macule with regular reticulated pattern on dermoscopy  -right lower back, Well healed scar without signs of recurrent pigmentation.   -several 1-2mm red dome shaped symmetric papules scattered on the trunk  -multiple tan/brown flat round macules and raised papules scattered throughout trunk, extremities and head. No worrisome features for malignancy noted on examination.  -scattered tan, homogenous macules scattered on sun exposed areas of trunk, extremities and face.   -scattered waxy, stuck on tan/brown papules and patches on the trunk     - No other lesions of concern on areas examined.     Medications:  Current Outpatient Medications   Medication    escitalopram (LEXAPRO) 10 MG tablet    fish oil-omega-3 fatty acids (OMEGA 3) 1000 MG capsule    fluticasone (FLONASE) 50 MCG/ACT nasal spray    lisinopril (ZESTRIL) 20 MG tablet    LORazepam (ATIVAN) 0.5 MG tablet    magnesium oxide 200 MG TABS    Multiple Vitamin (MULTI-VITAMIN) per tablet    ondansetron (ZOFRAN) 4 MG tablet    SENNA-docusate sodium (SENNA S) 8.6-50 MG tablet    simvastatin (ZOCOR) 20 MG tablet    tirzepatide-Weight Management (ZEPBOUND) 2.5 MG/0.5ML prefilled pen    tirzepatide-Weight Management (ZEPBOUND) 5 MG/0.5ML prefilled pen    tirzepatide-Weight Management (ZEPBOUND) 7.5 MG/0.5ML prefilled pen     No current facility-administered medications for this visit.      Past Medical History:   Patient Active Problem List   Diagnosis    DANIELLE on CPAP    Vitamin D deficiency    Left thyroid nodule    Anxiety    Morbid obesity (H)    Paroxysmal ventricular tachycardia (H)    Nonalcoholic fatty liver    TSH elevation    Glaucoma suspect of both eyes     Past Medical History:   Diagnosis Date    High triglycerides     HSV-1 infection 3/13/2018    DARLING (nonalcoholic steatohepatitis)     12/10     DANIELLE on CPAP     Palpitations     Paroxysmal ventricular tachycardia (H)     Strabismus     Thyroid nodule 02/2012    biopsy thyroglossal ductal cyst.   see Endo    Vitamin D deficiency     White coat syndrome without diagnosis of hypertension 11/22/2013    Home readings of 106/65        CC No referring provider defined for this encounter. on close of this encounter.

## 2024-03-12 NOTE — LETTER
3/12/2024         RE: Neptali Ospina  [address]  [facility] [address]        Dear Colleague,    Thank you for referring your patient, Neptali Ospina, to the [facility]. Please see a copy of my visit note below.    [facility] Dermatology Note  Encounter Date: Mar 12, 2024  Office Visit     Reviewed patients past medical history and pertinent chart review prior to patients visit today.     Dermatology Problem List:  1. DN, mild atypia, right lower back - s/p shave biopsy 9/23/2021   2.  Benign intradermal nevus on the mid chest biopsied 2/20/2023  ____________________________________________    Assessment & Plan:     # Right mid back, nevus to monitor. Photos in chart.     # Benign skin findings including: seborrheic keratoses, cherry angioma, lentigines and benign nevi.   - No further intervention required. Patient to report changes.   - Patient reassured of the benign nature of these lesions.    #Signs and Symptoms of non-melanoma skin cancer and ABCDEs of melanoma reviewed with patient. Patient encouraged to perform monthly self skin exams and educated on how to perform them. UV precautions reviewed with patient. Patient was asked about new or changing moles/lesions on body.     #Reviewed Sunscreen: Apply 20 minutes prior to going outdoors and reapply every two hours, when wet or sweating. We recommend using an SPF 30 or higher, and to use one that is water resistant.       Follow-up:  1-2 years for follow up full body skin exam, prn for new or changing lesions or new concerns    Ruth Velarde CNP  Dermatology     ____________________________________________    CC: Skin Check    HPI:  Mr. Neptali Ospina is a(n) 53 year old male who presents today as a return patient for a full body skin cancer screening. Patient has no specific concerns today.     Patient is otherwise feeling well, without additional skin concerns.     Physical Exam:  Vitals: There  were no vitals taken for this visit.  SKIN: Total skin excluding the genitalia areas was performed. The exam included the head/face, neck, both arms, chest, back, abdomen, both legs, digits, mons pubis, buttock and nails.   -right mid back, 6 x 5 mm irregular shaped macule with regular reticulated pattern on dermoscopy  -right lower back, Well healed scar without signs of recurrent pigmentation.   -several 1-2mm red dome shaped symmetric papules scattered on the trunk  -multiple tan/brown flat round macules and raised papules scattered throughout trunk, extremities and head. No worrisome features for malignancy noted on examination.  -scattered tan, homogenous macules scattered on sun exposed areas of trunk, extremities and face.   -scattered waxy, stuck on tan/brown papules and patches on the trunk     - No other lesions of concern on areas examined.     Medications:  Current Outpatient Medications   Medication     escitalopram (LEXAPRO) 10 MG tablet     fish oil-omega-3 fatty acids (OMEGA 3) 1000 MG capsule     fluticasone (FLONASE) 50 MCG/ACT nasal spray     lisinopril (ZESTRIL) 20 MG tablet     LORazepam (ATIVAN) 0.5 MG tablet     magnesium oxide 200 MG TABS     Multiple Vitamin (MULTI-VITAMIN) per tablet     ondansetron (ZOFRAN) 4 MG tablet     SENNA-docusate sodium (SENNA S) 8.6-50 MG tablet     simvastatin (ZOCOR) 20 MG tablet     tirzepatide-Weight Management (ZEPBOUND) 2.5 MG/0.5ML prefilled pen     tirzepatide-Weight Management (ZEPBOUND) 5 MG/0.5ML prefilled pen     tirzepatide-Weight Management (ZEPBOUND) 7.5 MG/0.5ML prefilled pen     No current facility-administered medications for this visit.      Past Medical History:   Patient Active Problem List   Diagnosis     DANIELLE on CPAP     Vitamin D deficiency     Left thyroid nodule     Anxiety     Morbid obesity (H)     Paroxysmal ventricular tachycardia (H)     Nonalcoholic fatty liver     TSH elevation     Glaucoma suspect of both eyes     Past Medical  History:   Diagnosis Date     High triglycerides      HSV-1 infection 3/13/2018     DARLING (nonalcoholic steatohepatitis)     12/10      DANIELLE on CPAP      Palpitations      Paroxysmal ventricular tachycardia (H)      Strabismus      Thyroid nodule 02/2012    biopsy thyroglossal ductal cyst.  see Endo     Vitamin D deficiency      White coat syndrome without diagnosis of hypertension 11/22/2013    Home readings of 106/65        CC No referring provider defined for this encounter. on close of this encounter.      Again, thank you for allowing me to participate in the care of your patient.        Sincerely,        RONALD Parks CNP

## 2024-04-01 NOTE — Clinical Note
dry, intact, no bleeding and no hematoma.  [id number]: previous_has_had_botox When Was Your Last Botox Treatment?: 12/04/2023

## 2024-04-23 ENCOUNTER — MYC MEDICAL ADVICE (OUTPATIENT)
Dept: FAMILY MEDICINE | Facility: CLINIC | Age: 54
End: 2024-04-23

## 2024-04-23 ENCOUNTER — MYC REFILL (OUTPATIENT)
Dept: CARDIOLOGY | Facility: CLINIC | Age: 54
End: 2024-04-23

## 2024-04-23 ENCOUNTER — OFFICE VISIT (OUTPATIENT)
Dept: FAMILY MEDICINE | Facility: CLINIC | Age: 54
End: 2024-04-23
Payer: COMMERCIAL

## 2024-04-23 VITALS
TEMPERATURE: 97.8 F | HEIGHT: 69 IN | RESPIRATION RATE: 16 BRPM | DIASTOLIC BLOOD PRESSURE: 78 MMHG | OXYGEN SATURATION: 100 % | BODY MASS INDEX: 33.52 KG/M2 | HEART RATE: 75 BPM | SYSTOLIC BLOOD PRESSURE: 136 MMHG | WEIGHT: 226.3 LBS

## 2024-04-23 DIAGNOSIS — N52.9 ERECTILE DYSFUNCTION, UNSPECIFIED ERECTILE DYSFUNCTION TYPE: Primary | ICD-10-CM

## 2024-04-23 DIAGNOSIS — K76.0 NONALCOHOLIC FATTY LIVER: ICD-10-CM

## 2024-04-23 DIAGNOSIS — E66.01 MORBID OBESITY (H): ICD-10-CM

## 2024-04-23 DIAGNOSIS — R79.89 TSH ELEVATION: ICD-10-CM

## 2024-04-23 PROCEDURE — 90746 HEPB VACCINE 3 DOSE ADULT IM: CPT | Performed by: PHYSICIAN ASSISTANT

## 2024-04-23 PROCEDURE — 99213 OFFICE O/P EST LOW 20 MIN: CPT | Mod: 25 | Performed by: PHYSICIAN ASSISTANT

## 2024-04-23 PROCEDURE — 90471 IMMUNIZATION ADMIN: CPT | Performed by: PHYSICIAN ASSISTANT

## 2024-04-23 RX ORDER — SILDENAFIL 50 MG/1
TABLET, FILM COATED ORAL
Qty: 12 TABLET | Refills: 5 | Status: SHIPPED | OUTPATIENT
Start: 2024-04-23

## 2024-04-23 NOTE — PROGRESS NOTES
Assessment & Plan     Erectile dysfunction, unspecified erectile dysfunction type  He had labs about 3 months ago showing normal CBC, CMP, A1c, PSA; TSH mildly elevated at 4.43, normal T4. Will check AM fasting testosterone to complete lab evaluation. Discussed options and will try sildenafil. Discussed r/b/se. Will start with 50 mg; reduce to 25 mg if side effects occur, can increase to maximum of 100 mg if tolerating well but incomplete response. Discussed that he should take 1 hour prior to sexual activity, take on empty stomach, avoid grapefruit/grapefruit juice and alcohol. Discussed risks/benefits/side effects. He does not take any nitrates.   - sildenafil (VIAGRA) 50 MG tablet; Take 1 tab (50 mg) daily as needed 1 hour prior to sexual activity; reduce to 25 mg if side effects occur, can increase to maximum of 100 mg if tolerating well but incomplete response. Take on empty stomach, avoid grapefruit/grapefruit juice and alcohol. Do not take with nitrate medications.  - Testosterone, total; Future    TSH elevation  Will recheck when he returns for testosterone test  - TSH with free T4 reflex; Future      Subjective   Neptali is a 54 year old, presenting for the following health issues:  Erectile Dysfunction        4/23/2024     8:30 AM   Additional Questions   Roomed by Isiah DUDLEY   Accompanied by no one         4/23/2024     8:30 AM   Patient Reported Additional Medications   Patient reports taking the following new medications none     History of Present Illness       Reason for visit:  EDHe consumes 1 sweetened beverage(s) daily.He exercises with enough effort to increase his heart rate 30 to 60 minutes per day.  He exercises with enough effort to increase his heart rate 5 days per week. He is missing 1 dose(s) of medications per week.  He is not taking prescribed medications regularly due to remembering to take.         Concern - ED   Onset: Na  Description: would like to discuss ED, Would like to  "discuss medication options  Intensity: NA  Progression of Symptoms:  NA  Accompanying Signs & Symptoms: NA  Previous history of similar problem: na  Precipitating factors:        Worsened by: na  Alleviating factors:        Improved by: na  Therapies tried and outcome:  none     History of HTN, obesity, anxiety, DANIELLE, nonalcoholic fatty liver    Difficulty maintaining erection at times. No difficulty attaining erection. Often no problems maintaining erection but noticing difficulty on occasion. He's not sure if he wants medication for this but his wife encouraged him to reach out to discuss options.    He does not take any nitrate medications.    He had labs about 3 months ago:  Normal CBC, CMP, A1c, PSA  Thyroid showed mildly elevated TSH at 4.43, normal T4    He has eaten this morning      Review of Systems  Constitutional, HEENT, cardiovascular, pulmonary, gi and gu systems are negative, except as otherwise noted.      Objective    /78   Pulse 75   Temp 97.8  F (36.6  C) (Oral)   Resp 16   Ht 1.753 m (5' 9\")   Wt 102.6 kg (226 lb 4.8 oz)   SpO2 100%   BMI 33.42 kg/m    Body mass index is 33.42 kg/m .  Physical Exam   GENERAL: alert and no distress  EYES: Eyes grossly normal to inspection, PERRL and conjunctivae and sclerae normal  RESP: lungs clear to auscultation - no rales, rhonchi or wheezes  CV: regular rate and rhythm, normal S1 S2, no S3 or S4, no murmur, click or rub, no peripheral edema  NEURO: Normal strength and tone, mentation intact and speech normal  PSYCH: mentation appears normal, affect normal/bright          Signed Electronically by: Padmini Thompson PA-C    "

## 2024-04-23 NOTE — TELEPHONE ENCOUNTER
If there is not a 7.5 mg dose available, he may need to stay on the 5 mg dose for longer instead of skipping to the 10 mg dose. He is following with [place] pharmacist, Todd Carver, PharmD so will send question to him for review.     Padmini Thompson PA-C

## 2024-04-26 ENCOUNTER — LAB (OUTPATIENT)
Dept: LAB | Facility: CLINIC | Age: 54
End: 2024-04-26
Payer: COMMERCIAL

## 2024-04-26 DIAGNOSIS — R79.89 TSH ELEVATION: ICD-10-CM

## 2024-04-26 DIAGNOSIS — N52.9 ERECTILE DYSFUNCTION, UNSPECIFIED ERECTILE DYSFUNCTION TYPE: ICD-10-CM

## 2024-04-26 LAB — TSH SERPL DL<=0.005 MIU/L-ACNC: 3.12 UIU/ML (ref 0.3–4.2)

## 2024-04-26 PROCEDURE — 36415 COLL VENOUS BLD VENIPUNCTURE: CPT

## 2024-04-26 PROCEDURE — 84403 ASSAY OF TOTAL TESTOSTERONE: CPT

## 2024-04-26 PROCEDURE — 84443 ASSAY THYROID STIM HORMONE: CPT

## 2024-04-26 NOTE — TELEPHONE ENCOUNTER
Clinical Pharmacy Note    See [employer] communication. Sent Wegovy 1mg to pharmacy for patient due to Zepbound 5mg shortage. For Justin Carver, PharmD while out of office.    Ashleigh Marquez, Pharm D., MPH    Medication Therapy Management Pharmacist   [facility]

## 2024-04-30 ENCOUNTER — TELEPHONE (OUTPATIENT)
Dept: CARDIOLOGY | Facility: CLINIC | Age: 54
End: 2024-04-30
Payer: COMMERCIAL

## 2024-04-30 DIAGNOSIS — N52.9 ERECTILE DYSFUNCTION, UNSPECIFIED ERECTILE DYSFUNCTION TYPE: Primary | ICD-10-CM

## 2024-04-30 LAB — TESTOST SERPL-MCNC: 264 NG/DL (ref 240–950)

## 2024-04-30 NOTE — TELEPHONE ENCOUNTER
Hello team,    Forwarding this encounter as a request to initiate prior authorization for Wegovy 1 mg once weekly.  Please let me know if you require any additional detail.  Thank you for your help,    Justin

## 2024-05-01 ENCOUNTER — TELEPHONE (OUTPATIENT)
Dept: SURGERY | Facility: CLINIC | Age: 54
End: 2024-05-01
Payer: COMMERCIAL

## 2024-05-01 NOTE — TELEPHONE ENCOUNTER
PA Initiation    Medication: WEGOVY 1 MG/0.5ML SC SOAJ  Insurance Company: [employer] - Phone [phone] Fax [phone]  Pharmacy Filling the Rx: [facility] - [place], [place] - [facility] [address]  Filling Pharmacy Phone:    Filling Pharmacy Fax:    Start Date: 5/1/2024

## 2024-05-03 NOTE — TELEPHONE ENCOUNTER
Prior Authorization Approval    Medication: WEGOVY 1 MG/0.5ML SC SOAJ  Authorization Effective Date: 5/2/2024  Authorization Expiration Date: 11/21/2024  Approved Dose/Quantity: 2  Reference #: [id number]   Insurance Company: [employer] ([employer]) - Phone [phone] Fax [phone]  Expected CoPay: $    CoPay Card Available:      Financial Assistance Needed:    Which Pharmacy is filling the prescription: [facility] - [place] [address] [address]  Pharmacy Notified: yes  Patient Notified: yes

## 2024-05-07 DIAGNOSIS — G47.33 OBSTRUCTIVE SLEEP APNEA (ADULT) (PEDIATRIC): Primary | ICD-10-CM

## 2024-05-08 DIAGNOSIS — E66.01 MORBID OBESITY (H): ICD-10-CM

## 2024-05-08 DIAGNOSIS — K76.0 NONALCOHOLIC FATTY LIVER: ICD-10-CM

## 2024-05-09 ENCOUNTER — VIRTUAL VISIT (OUTPATIENT)
Dept: CARDIOLOGY | Facility: CLINIC | Age: 54
End: 2024-05-09
Attending: PHYSICIAN ASSISTANT
Payer: COMMERCIAL

## 2024-05-09 VITALS — WEIGHT: 220 LBS | BODY MASS INDEX: 33.34 KG/M2 | HEIGHT: 68 IN

## 2024-05-09 DIAGNOSIS — K76.0 NONALCOHOLIC FATTY LIVER: ICD-10-CM

## 2024-05-09 DIAGNOSIS — E66.01 MORBID OBESITY (H): Primary | ICD-10-CM

## 2024-05-09 ASSESSMENT — PAIN SCALES - GENERAL: PAINLEVEL: NO PAIN (0)

## 2024-05-09 NOTE — Clinical Note
5/9/2024      RE: Neptali Ospina  [address]  [facility] [address]       Dear Colleague,    Thank you for the opportunity to participate in the care of your patient, Neptali Ospina, at the [facility]. Please see a copy of my visit note below.    Medication Therapy Management (MTM) Encounter    ASSESSMENT:                            Medication Adherence/Access: See below for considerations    Weight management: Response to initiation and titration of Zepbound to 5 mg has been met with positive weight response and without general GI adverse effects aside from mild constipation.  Unfortunately, due to shortages, patient has been without approaching 2 weeks.  Given his generally positive response, feasible to restart Zepbound at 5 mg if able.  Advised against use of Wegovy which was prescribed as an attempt to bridge therapy if he goes beyond 17 days from last administration of Zepbound.      PLAN:                            As discussed, I will renew orders for Zepbound at 2.5 mg, 5 mg, 7.5 mg and placed new orders for 10 mg.    I recommend you attempt to restart Zepbound to 5 mg once weekly.  If the pharmacy does not have this on hand, instead begin with 2.5 mg once weekly.  Once you have been on Zepbound 5 mg once weekly for at least 4 weeks, if able based on supply increase to 7.5 mg once weekly for 4 weeks then 10 mg once weekly thereafter.    As discussed, it would be reasonable for you to use Wegovy but only if it is started before Monday, May 13 due to latency between your last injection of Zepbound and now.    Follow-up with me as scheduled in August.    SUBJECTIVE/OBJECTIVE:                          Neptali Ospina is a 54 year old male called for a follow-up visit.       Reason for visit: Zepbound follow-up.    Allergies/ADRs: Reviewed in chart  Past Medical History: Reviewed in chart  Tobacco: He reports that  "he has quit smoking. His smoking use included cigarettes. He has never used smokeless tobacco.      Medication Adherence/Access: no issues reported    Weight management:  Phone follow-up after previous initiation of Zepbound.  He comments that Zepbound had been very effective, but due to shortages has been without. Made it up to 5 mg weekly, has been without for 2 weeks. Has had no nausea, or other adverse effects. Feels effects were dramatically different compared to Wegovy. Denies any bothersome troubles with constipation, able to pass Bms but some hardening of stools. Notes Wegovy did come along with some degree of nausea. Baseline weight was 230's, lost down closer in to the 200 lb range. Has been making active eating habits, working out daily. Did receive Wegovy 1 mg weekly.  He will plan to initiate this within the next day or 2 if he is unable to access Zepbound.  He will not use Wegovy if he has not started it beyond Monday, May 13.    Wt Readings from Last 4 Encounters:   05/09/24 99.8 kg (220 lb)   04/23/24 102.6 kg (226 lb 4.8 oz)   02/15/24 104.3 kg (230 lb)   02/06/24 106 kg (233 lb 11.2 oz)     Body Mass Index (BMI) Body mass index is 33.45 kg/m .    Today's Vitals: Ht 1.727 m (5' 8\")   Wt 99.8 kg (220 lb)   BMI 33.45 kg/m      Lab Results   Component Value Date    A1C 5.2 01/05/2024    A1C 5.4 01/24/2023    A1C 5.5 11/16/2021    A1C 5.4 11/02/2020    A1C 5.5 04/20/2016    A1C 5.3 11/22/2013    A1C 5.5 05/25/2011     ----------------      I spent 20 minutes with this patient today. All changes were made via collaborative practice agreement with Keely Frias PA-C . A copy of the visit note was provided to the patient's provider(s).    A summary of these recommendations was sent via [employer].    Todd Carver, PharmD, BCACP  Medication Therapy Management Pharmacist  [facility]     Telemedicine Visit Details  Type of service:  Telephone visit  Start Time:  150pm  End Time:  210pm     " Medication Therapy Recommendations  No medication therapy recommendations to display         Please do not hesitate to contact me if you have any questions/concerns.     Sincerely,     CLAUDIA JENNINGS RPH

## 2024-05-09 NOTE — PROGRESS NOTES
Medication Therapy Management (MTM) Encounter    ASSESSMENT:                            Medication Adherence/Access: See below for considerations    Weight management: Response to initiation and titration of Zepbound to 5 mg has been met with positive weight response and without general GI adverse effects aside from mild constipation.  Unfortunately, due to shortages, patient has been without approaching 2 weeks.  Given his generally positive response, feasible to restart Zepbound at 5 mg if able.  Advised against use of Wegovy which was prescribed as an attempt to bridge therapy if he goes beyond 17 days from last administration of Zepbound.      PLAN:                            As discussed, I will renew orders for Zepbound at 2.5 mg, 5 mg, 7.5 mg and placed new orders for 10 mg.    I recommend you attempt to restart Zepbound to 5 mg once weekly.  If the pharmacy does not have this on hand, instead begin with 2.5 mg once weekly.  Once you have been on Zepbound 5 mg once weekly for at least 4 weeks, if able based on supply increase to 7.5 mg once weekly for 4 weeks then 10 mg once weekly thereafter.    As discussed, it would be reasonable for you to use Wegovy but only if it is started before Monday, May 13 due to latency between your last injection of Zepbound and now.    Follow-up with me as scheduled in August.    SUBJECTIVE/OBJECTIVE:                          Neptali Ospina is a 54 year old male called for a follow-up visit.       Reason for visit: Zepbound follow-up.    Allergies/ADRs: Reviewed in chart  Past Medical History: Reviewed in chart  Tobacco: He reports that he has quit smoking. His smoking use included cigarettes. He has never used smokeless tobacco.      Medication Adherence/Access: no issues reported    Weight management:  Phone follow-up after previous initiation of Zepbound.  He comments that Zepbound had been very effective, but due to shortages has been without. Made it up to 5 mg weekly,  "has been without for 2 weeks. Has had no nausea, or other adverse effects. Feels effects were dramatically different compared to Wegovy. Denies any bothersome troubles with constipation, able to pass Bms but some hardening of stools. Notes Wegovy did come along with some degree of nausea. Baseline weight was 230's, lost down closer in to the 200 lb range. Has been making active eating habits, working out daily. Did receive Wegovy 1 mg weekly.  He will plan to initiate this within the next day or 2 if he is unable to access Zepbound.  He will not use Wegovy if he has not started it beyond Monday, May 13.    Wt Readings from Last 4 Encounters:   05/09/24 99.8 kg (220 lb)   04/23/24 102.6 kg (226 lb 4.8 oz)   02/15/24 104.3 kg (230 lb)   02/06/24 106 kg (233 lb 11.2 oz)     Body Mass Index (BMI) Body mass index is 33.45 kg/m .    Today's Vitals: Ht 1.727 m (5' 8\")   Wt 99.8 kg (220 lb)   BMI 33.45 kg/m      Lab Results   Component Value Date    A1C 5.2 01/05/2024    A1C 5.4 01/24/2023    A1C 5.5 11/16/2021    A1C 5.4 11/02/2020    A1C 5.5 04/20/2016    A1C 5.3 11/22/2013    A1C 5.5 05/25/2011     ----------------      I spent 20 minutes with this patient today. All changes were made via collaborative practice agreement with Keely Frias PA-C . A copy of the visit note was provided to the patient's provider(s).    A summary of these recommendations was sent via [employer].    Todd Carver, PharmD, BCACP  Medication Therapy Management Pharmacist  [facility]     Telemedicine Visit Details  Type of service:  Telephone visit  Start Time:  150pm  End Time:  210pm     Medication Therapy Recommendations  No medication therapy recommendations to display     "

## 2024-05-09 NOTE — PATIENT INSTRUCTIONS
"Recommendations from today's MTM visit:                                                    MTM (medication therapy management) is a service provided by a clinical pharmacist designed to help you get the most of out of your medicines.      As discussed, I will renew orders for Zepbound at 2.5 mg, 5 mg, 7.5 mg and placed new orders for 10 mg.     I recommend you attempt to restart Zepbound to 5 mg once weekly.  If the pharmacy does not have this on hand, instead begin with 2.5 mg once weekly.  Once you have been on Zepbound 5 mg once weekly for at least 4 weeks, if able based on supply increase to 7.5 mg once weekly for 4 weeks then 10 mg once weekly thereafter.     As discussed, it would be reasonable for you to use Wegovy but only if it is started before Monday, May 13 due to latency between your last injection of Zepbound and now.     Follow-up with me as scheduled in August.    It was great speaking with you today.  I value your experience and would be very thankful for your time in providing feedback in our clinic survey. In the next few days, you may receive an email or text message from [employer] with a link to a survey related to your  clinical pharmacist.\"     To schedule another MTM appointment, please call the clinic directly or you may call the MTM scheduling line at [phone].    My Clinical Pharmacist's contact information:                                                      Please feel free to contact me with any questions or concerns you have.      Todd Carver, PharmD, BCACP  Medication Therapy Management Pharmacist  [facility]    "

## 2024-05-09 NOTE — NURSING NOTE
Is the patient currently in the state of [place]? YES    Visit mode:TELEPHONE    If the visit is dropped, the patient can be reconnected by: TELEPHONE VISIT: Phone number: [phone]    Will anyone else be joining the visit? NO  (If patient encounters technical issues they should call [phone])    How would you like to obtain your AVS? MyChart    Are changes needed to the allergy or medication list? Yes Seasonal allergies    Are refills needed on medications prescribed by this physician? NO     Reason for visit: RECHECK    Wt other than 24 hrs:  three days ago  Pain more than one location:  no  Jo ALVARADO

## 2024-06-04 ENCOUNTER — OFFICE VISIT (OUTPATIENT)
Dept: PODIATRY | Facility: CLINIC | Age: 54
End: 2024-06-04
Payer: COMMERCIAL

## 2024-06-04 VITALS — WEIGHT: 220 LBS | SYSTOLIC BLOOD PRESSURE: 128 MMHG | DIASTOLIC BLOOD PRESSURE: 96 MMHG | BODY MASS INDEX: 33.45 KG/M2

## 2024-06-04 DIAGNOSIS — M79.671 RIGHT FOOT PAIN: Primary | ICD-10-CM

## 2024-06-04 DIAGNOSIS — L60.0 INGROWN NAIL OF GREAT TOE OF RIGHT FOOT: ICD-10-CM

## 2024-06-04 PROCEDURE — 99213 OFFICE O/P EST LOW 20 MIN: CPT | Mod: 25 | Performed by: PODIATRIST

## 2024-06-04 PROCEDURE — 11750 EXCISION NAIL&NAIL MATRIX: CPT | Mod: T5 | Performed by: PODIATRIST

## 2024-06-04 RX ORDER — SILVER SULFADIAZINE 10 MG/G
CREAM TOPICAL 2 TIMES DAILY
Qty: 25 G | Refills: 1 | Status: SHIPPED | OUTPATIENT
Start: 2024-06-04

## 2024-06-04 NOTE — PATIENT INSTRUCTIONS
Thank you for choosing [facility] Podiatry / Foot & Ankle Surgery!    DR TAVARES'S CLINIC:  [facility]   [address]   [place] [address]      TRIAGE LINE: [phone]  APPOINTMENTS: [phone]  RADIOLOGY: [phone]  SET UP SURGERY: [phone]  PHYSICAL THERAPY: [phone]   FAX NUMBER: [phone]  BILLING QUESTIONS: [phone]       Follow up: As needed      INGROWN TOENAILS  When a toenail is ingrown, it is curved and grows into the skin, usually at the nail borders (the sides of the nail). This  digging in  of the nail irritates the skin, often creating pain, redness, swelling, and warmth in the toe.  If an ingrown nail causes a break in the skin, bacteria may enter and cause an infection in the area, which is often marked by drainage and a foul odor. However, even if the toe isn t painful, red, swollen, or warm, a nail that curves downward into the skin can progress to an infection.  CAUSES:  Heredity: In many people, the tendency for ingrown toenails is inherited.   Trauma: Sometimes an ingrown toenail is the result of trauma, such as stubbing your toe, having an object fall on your toe, or engaging in activities that involve repeated pressure on the toes, such as kicking or running.   Improper Trimming:  The most common cause of ingrown toenails is cutting your nails too short. This encourages the skin next to the nail to fold over the nail.   Improperly Sized Footwear: Ingrown toenails can result from wearing socks and shoes that are tight or short.   Nail Conditions: Ingrown toenails can be caused by nail problems, such as fungal infections or losing a nail due to trauma.   TREATMENT: Sometimes initial treatment for ingrown toenails can be safely performed at home. However, home treatment is strongly discouraged if an infection is suspected, or for those who have medical conditions that put feet at high risk, such as diabetes, nerve damage in the foot, or poor  circulation.  Home care: If you don t have an infection or any of the above medical conditions, you can soak your foot in room-temperature water (adding Epsom s salt may be recommended by your doctor), and gently massage the side of the nail fold to help reduce the inflammation.  Avoid attempting  bathroom surgery.  Repeated cutting of the nail can cause the condition to worsen over time. If your symptoms fail to improve, it s time to see a foot and ankle surgeon.  Physician care: After examining the toe, the foot and ankle surgeon will select the treatment best suited for you. If an infection is present, an oral antibiotic may be prescribed.  Sometimes a minor surgical procedure, often performed in the office, will ease the pain and remove the offending nail. After applying a local anesthetic, the doctor removes part of the nail s side border. Some nails may become ingrown again, requiring removal of the nail root.  Following the nail procedure, a light bandage will be applied. Most people experience very little pain after surgery and may resume normal activity the next day. If your surgeon has prescribed an oral antibiotic, be sure to take all the medication, even if your symptoms have improved.  PREVENTION:  Proper Trimming: Cut toenails in a fairly straight line, and don t cut them too short. You should be able to get your fingernail under the sides and end of the nail.   Well-fitting Footwear: Don t wear shoes that are short or tight in the toe area. Avoid shoes that are loose, because they too cause pressure on the toes, especially when running or walking briskly.     INGROWN TOENAIL REMOVAL AFTERCARE   Go directly home and elevate the affected foot on one or two pillows for the remainder of the day/evening if possible. Your toe may stay numb anywhere from 2-8 hours.   Take Tylenol, ibuprofen or another anti-inflammatory as needed for pain.   Take antibiotic if that has been prescribed. Finish the entire  prescribed antibiotic even if your symptoms have improved.   The evening of the procedure, soak/wash the affected area in warm water (you may add Epsom salt) for 5 to 10 minutes. Do this twice a day for 2-4 weeks (6-8 weeks if you had phenol) (you may count showering/bathing as one soak).  After soaks, pat the area dry and then allow to airdry for a few minutes. Apply antibiotic ointment to the area and cover with 2 X 2 gauze and paper tape or band-aid.  You may pursue everyday activities as tolerated with either an open toe shoe or cut-out shoe as needed or you may wear regular shoes if no pain is noted.  Watch for any signs and symptoms of infection such as: redness, red streaks going up the foot/leg, swelling, pus or foul odor. Those that have had the phenol procedure, the toe will drain longer and will look like it is infected because it is a chemical burn.   Please call with questions.

## 2024-06-04 NOTE — PROGRESS NOTES
Podiatry / Foot and Ankle Surgery Progress Note    June 4, 2024    Subject: Patient was seen for right great toe pain.  Notes that he has had an ingrown nail on the left great toe that was removed.  He thinks it is ingrown on the right great toe.  Has been going on for a few weeks.  Pain is 5 out of 10.  Worse with pressure.    Objective:  Vitals: Wt 99.8 kg (220 lb)   BMI 33.45 kg/m    BMI= Body mass index is 33.45 kg/m .    General:  Patient is alert and orientated.  NAD.    Dermatologic: lateral border of the right great toenail is incurvated.  Localized redness and pain on palpation     Vascular: DP & PT pulses are intact & regular bilaterally.  No significant edema or varicosities noted.  CFT and skin temperature is normal to both lower extremities.     Neurologic: Lower extremity sensation is intact to light touch.  No evidence of weakness or contracture in the lower extremities.  No evidence of neuropathy.     Musculoskeletal: Patient is ambulatory without assistive device or brace.  No gross ankle deformity noted.  No foot or ankle joint effusion is noted.     ASSESSMENT:     Right foot pain  Ingrown nail of great toe of right foot     Medical Decision Making/Plan:  Reviewed patient's chart in [facility]. The potential causes and nature of an ingrown toenail were discussed with the patient.  We reviewed the natural history/prognosis of the condition and potential risks if no treatment is provided.      Treatment options discussed included conservative management (oral antibiotics, soaking of foot, adequate width shoes)  as well as surgical management (partial or total nail removal).  The pros and cons of both forms of treatment were reviewed.      After thorough discussion and answering all questions, the patient elected to have the border removed.  He will soak the foot twice a day for 6 weeks and apply silvadene cream and a Band-Aid.  All questions were answered to patient satisfaction he will call further  questions or concerns.      Procedure: After verbal consent, the right big toe was anesthetized with 5cc's of 1% lidocaine plain. A tourniquet was applied to the toe. The lateral border was then raised from the nail bed and then cut the length of the nail.  The offending nail border was then removed.  Three 30 second applications of phenol were applied to the nail bed and nail matrix.  The area was then flushed with copious amounts of alcohol.  Bacitracin was applied to the nail bed.  The tourniquet was removed.  Bandage was applied to the toe.  The patient tolerated the procedure and anesthesia well.        Patient risk factor: Patient is at low risk for infection.      Thalia Prado DPM, Podiatry/Foot and Ankle Surgery

## 2024-06-04 NOTE — LETTER
6/4/2024         RE: Neptali Ospina  [address] [place] [address]        Dear Colleague,    Thank you for referring your patient, Neptali Ospina, to the [facility] PODIATRY. Please see a copy of my visit note below.    Podiatry / Foot and Ankle Surgery Progress Note    June 4, 2024    Subject: Patient was seen for right great toe pain.  Notes that he has had an ingrown nail on the left great toe that was removed.  He thinks it is ingrown on the right great toe.  Has been going on for a few weeks.  Pain is 5 out of 10.  Worse with pressure.    Objective:  Vitals: Wt 99.8 kg (220 lb)   BMI 33.45 kg/m    BMI= Body mass index is 33.45 kg/m .    General:  Patient is alert and orientated.  NAD.    Dermatologic: lateral border of the right great toenail is incurvated.  Localized redness and pain on palpation     Vascular: DP & PT pulses are intact & regular bilaterally.  No significant edema or varicosities noted.  CFT and skin temperature is normal to both lower extremities.     Neurologic: Lower extremity sensation is intact to light touch.  No evidence of weakness or contracture in the lower extremities.  No evidence of neuropathy.     Musculoskeletal: Patient is ambulatory without assistive device or brace.  No gross ankle deformity noted.  No foot or ankle joint effusion is noted.     ASSESSMENT:     Right foot pain  Ingrown nail of great toe of right foot     Medical Decision Making/Plan:  Reviewed patient's chart in [facility]. The potential causes and nature of an ingrown toenail were discussed with the patient.  We reviewed the natural history/prognosis of the condition and potential risks if no treatment is provided.      Treatment options discussed included conservative management (oral antibiotics, soaking of foot, adequate width shoes)  as well as surgical management (partial or total nail removal).  The pros and cons of both forms of treatment were reviewed.      After  thorough discussion and answering all questions, the patient elected to have the border removed.  He will soak the foot twice a day for 6 weeks and apply silvadene cream and a Band-Aid.  All questions were answered to patient satisfaction he will call further questions or concerns.      Procedure: After verbal consent, the right big toe was anesthetized with 5cc's of 1% lidocaine plain. A tourniquet was applied to the toe. The lateral border was then raised from the nail bed and then cut the length of the nail.  The offending nail border was then removed.  Three 30 second applications of phenol were applied to the nail bed and nail matrix.  The area was then flushed with copious amounts of alcohol.  Bacitracin was applied to the nail bed.  The tourniquet was removed.  Bandage was applied to the toe.  The patient tolerated the procedure and anesthesia well.        Patient risk factor: Patient is at low risk for infection.      Thalia Prado DPM, Podiatry/Foot and Ankle Surgery      Again, thank you for allowing me to participate in the care of your patient.        Sincerely,        Thalia Prado DPM, Podiatry/Foot and Ankle Surgery

## 2024-08-08 ENCOUNTER — VIRTUAL VISIT (OUTPATIENT)
Dept: CARDIOLOGY | Facility: CLINIC | Age: 54
End: 2024-08-08
Attending: PHYSICIAN ASSISTANT
Payer: COMMERCIAL

## 2024-08-08 VITALS — WEIGHT: 222 LBS | BODY MASS INDEX: 33.75 KG/M2

## 2024-08-08 DIAGNOSIS — E66.01 MORBID OBESITY (H): Primary | ICD-10-CM

## 2024-08-08 ASSESSMENT — PAIN SCALES - GENERAL: PAINLEVEL: NO PAIN (0)

## 2024-08-08 NOTE — NURSING NOTE
Current patient location: [facility] [address]  [facility] [address]    Is the patient currently in the state of [place]? YES    Visit mode:TELEPHONE    If the visit is dropped, the patient can be reconnected by: TELEPHONE VISIT: Phone number:   Telephone Information:   Mobile [phone]       Will anyone else be joining the visit? NO  (If patient encounters technical issues they should call [phone]    How would you like to obtain your AVS? MyChart    Are changes needed to the allergy or medication list? No, Pt stated no changes to allergies, and Pt stated no med changes    Are refills needed on medications prescribed by this physician? NO    Rooming Documentation:  Not applicable      Reason for visit: Medication Therapy Management    Elenita ALVARADO

## 2024-08-08 NOTE — PATIENT INSTRUCTIONS
"Recommendations from today's MTM visit:                                                    MTM (medication therapy management) is a service provided by a clinical pharmacist designed to help you get the most of out of your medicines.      Complete your supply of Zepbound 7.5 mg once weekly then increase to 10 mg once weekly for at least 4 weeks.     Remain on 10 mg once weekly if you are tolerating well and making weight loss progress.  Otherwise, increase to 12.5 mg once weekly for at least 4 weeks if needed, then increase to 50 mg once weekly thereafter if needed.     Please remain on the lowest effective dose that you are tolerating without adverse effects.     Follow-up with Ashleigh Marquez, Pharm.D. as scheduled in November.    It was great speaking with you today.  I value your experience and would be very thankful for your time in providing feedback in our clinic survey. In the next few days, you may receive an email or text message from [employer] with a link to a survey related to your  clinical pharmacist.\"     To schedule another MTM appointment, please call the clinic directly or you may call the MTM scheduling line at [phone].    My Clinical Pharmacist's contact information:                                                      Please feel free to contact me with any questions or concerns you have.      Todd Carver, PharmD, BCACP  Medication Therapy Management Pharmacist  [facility]    "

## 2024-08-08 NOTE — PROGRESS NOTES
Medication Therapy Management (MTM) Encounter    ASSESSMENT:                            Medication Adherence/Access: No issues identified    Weight management: Steady, incremental weight loss progress with Zepbound titrated to 7.5 mg weekly.  Therapy is well-tolerated, adverse effects limited to mild, self limited constipation.  Effects on appetite are balanced enabling appropriate nutritional modifications and portion control.  Given relatively subtle weight loss to this point, advised titration with utilization of lowest effective dosing.      PLAN:                            Complete your supply of Zepbound 7.5 mg once weekly then increase to 10 mg once weekly for at least 4 weeks.    Remain on 10 mg once weekly if you are tolerating well and making weight loss progress.  Otherwise, increase to 12.5 mg once weekly for at least 4 weeks if needed, then increase to 50 mg once weekly thereafter if needed.    Please remain on the lowest effective dose that you are tolerating without adverse effects.    Follow-up with Jimbo aBrbaD. as scheduled in November.    SUBJECTIVE/OBJECTIVE:                          Neptali Ospina is a 54 year old male called for a follow-up visit.       Reason for visit: Zepbound follow up.    Allergies/ADRs: Reviewed in chart  Past Medical History: Reviewed in chart  Tobacco: He reports that he has quit smoking. His smoking use included cigarettes. He has never used smokeless tobacco.      Medication Adherence/Access: no issues reported    Weight management:  Zepbound 7.5 mg weekly    Has been on Zepbound 7.5 mg, total of 2x boxes, did not end up needing to switch to Wegovy as previously documented.. Things have been going well overall. Has some mild constipation, harder stools. Overall tolerable constipation, but noticeable, has not required stool softeners.  Denies nausea, vomiting. Denies acid reflux, abdominal pain or other concerns. Has had effective appetite suppression, feels  effects are balanced. Protein shake in the morning, one larger meal aside from that. Protein, starch, vegetable. Has cut out all pop, carbonated beverages. Feels he has observed some subtle weight loss progress    Wt Readings from Last 4 Encounters:   08/08/24 100.7 kg (222 lb)   06/04/24 99.8 kg (220 lb)   05/09/24 99.8 kg (220 lb)   04/23/24 102.6 kg (226 lb 4.8 oz)     Body Mass Index (BMI) Body mass index is 33.75 kg/m .    Today's Vitals: Wt 100.7 kg (222 lb)   BMI 33.75 kg/m      Lab Results   Component Value Date    A1C 5.2 01/05/2024    A1C 5.4 01/24/2023    A1C 5.5 11/16/2021    A1C 5.4 11/02/2020    A1C 5.5 04/20/2016    A1C 5.3 11/22/2013    A1C 5.5 05/25/2011     ----------------      I spent 15 minutes with this patient today. All changes were made via collaborative practice agreement with Keely Frias. A copy of the visit note was provided to the patient's provider(s).    A summary of these recommendations was sent via [employer].    Todd Carver, PharmD, BCACP  Medication Therapy Management Pharmacist  [facility]     Telemedicine Visit Details  Type of service:  Telephone visit  Start Time:  200pm  End Time:  215pm     Medication Therapy Recommendations  No medication therapy recommendations to display

## 2024-08-08 NOTE — Clinical Note
8/8/2024      RE: Neptali Ospina  [address]  [facility] [address]       Dear Colleague,    Thank you for the opportunity to participate in the care of your patient, Neptali Ospina, at the [facility] [place] at [facility]. Please see a copy of my visit note below.    No notes on file    Please do not hesitate to contact me if you have any questions/concerns.     Sincerely,     CLAUDIA JENNINGS RPH

## 2024-08-23 ENCOUNTER — TELEPHONE (OUTPATIENT)
Dept: ENDOCRINOLOGY | Facility: CLINIC | Age: 54
End: 2024-08-23

## 2024-08-23 NOTE — TELEPHONE ENCOUNTER
[facility]  Fax:[phone]  Spec: [phone]  MO: [phone]

## 2024-08-27 NOTE — TELEPHONE ENCOUNTER
Central Prior Authorization Team   Phone: [phone]    PA Initiation    Medication: Zepbound 10mg  Insurance Company: [employer] - Phone [phone] Fax [phone]  Pharmacy Filling the Rx: [facility] - [place], [place] - [address]  Filling Pharmacy Phone: [phone]  Filling Pharmacy Fax:    Start Date: 8/27/2024

## 2024-08-29 NOTE — TELEPHONE ENCOUNTER
Prior Authorization Approval    Authorization Effective Date: 8/28/2024  Authorization Expiration Date: 8/27/2025  Medication: Zepbound 10mg-PA APPROVED   Approved Dose/Quantity:  QUANTITY LIMIT OF 2 ML PER 28 DAYS   Reference #:     Insurance Company: [employer] - Phone [phone] Fax [phone]  Expected CoPay:       CoPay Card Available:      Foundation Assistance Needed:    Which Pharmacy is filling the prescription (Not needed for infusion/clinic administered): [facility] - [place], [place] - [address]  Pharmacy Notified:  Yes  Patient Notified:  No

## 2024-10-01 ENCOUNTER — IMMUNIZATION (OUTPATIENT)
Dept: FAMILY MEDICINE | Facility: CLINIC | Age: 54
End: 2024-10-01
Payer: COMMERCIAL

## 2024-10-01 DIAGNOSIS — Z23 ENCOUNTER FOR IMMUNIZATION: Primary | ICD-10-CM

## 2024-10-01 PROCEDURE — 90472 IMMUNIZATION ADMIN EACH ADD: CPT

## 2024-10-01 PROCEDURE — 90673 RIV3 VACCINE NO PRESERV IM: CPT

## 2024-10-01 PROCEDURE — 90746 HEPB VACCINE 3 DOSE ADULT IM: CPT

## 2024-10-01 PROCEDURE — 99207 PR NO CHARGE NURSE ONLY: CPT

## 2024-10-01 PROCEDURE — 90471 IMMUNIZATION ADMIN: CPT

## 2024-10-01 NOTE — PROGRESS NOTES
Prior to immunization administration, verified patients identity using patient s name and date of birth. Please see Immunization Activity for additional information.     Is the patient's temperature normal (100.5 or less)? Yes     Patient MEETS CRITERIA. PROCEED with vaccine administration.      Patient instructed to remain in clinic for 15 minutes afterwards, and to report any adverse reactions.      Link to Ancillary Visit Immunization Standing Orders SmartSet     Screening performed by Tamera Lockhart MA on 10/1/2024 at 9:32 AM.

## 2024-10-13 ENCOUNTER — MYC REFILL (OUTPATIENT)
Dept: FAMILY MEDICINE | Facility: CLINIC | Age: 54
End: 2024-10-13
Payer: COMMERCIAL

## 2024-10-13 DIAGNOSIS — F40.243 ANXIETY WITH FLYING: ICD-10-CM

## 2024-10-14 RX ORDER — LORAZEPAM 0.5 MG/1
.5-2 TABLET ORAL EVERY 6 HOURS PRN
Qty: 8 TABLET | Refills: 0 | Status: SHIPPED | OUTPATIENT
Start: 2024-10-14

## 2024-11-14 ENCOUNTER — VIRTUAL VISIT (OUTPATIENT)
Dept: PHARMACY | Facility: CLINIC | Age: 54
End: 2024-11-14
Attending: PHYSICIAN ASSISTANT
Payer: COMMERCIAL

## 2024-11-14 VITALS — HEIGHT: 69 IN | WEIGHT: 219 LBS | BODY MASS INDEX: 32.44 KG/M2

## 2024-11-14 DIAGNOSIS — E66.811 OBESITY, CLASS I, BMI 30-34.9: Primary | ICD-10-CM

## 2024-11-14 ASSESSMENT — PAIN SCALES - GENERAL: PAINLEVEL_OUTOF10: NO PAIN (0)

## 2024-11-14 NOTE — Clinical Note
"Dayne Washington,   Today I met with our patient regarding GLP1 management for weight . We have recently been informed that as of January 2025, [employer] employees will no longer have coverage for GLP1s for weight management.   Patients will likely transition back to you for weight management (vs sharing patients with Medication Therapy Management at [facility]). AND I want to be supportive with this change/transition. There certainly will be some frustrations with costs and \"what's next\". I did discuss compounded semaglutide and Zepbound vials through [facility] as 2 options that bring down cost slightly. These can be prescribed through your clinic, happy to help if needed with those initial orders.   There is no action needed at this time. Patient is aware of this change and elected to continue GLP1 for now.    Let me know if you have any questions or concerns, Ashleigh Marquez, PharmD   "

## 2024-11-14 NOTE — NURSING NOTE
[facility]    Is the patient currently in the state of [place]? YES    Visit mode:TELEPHONE    If the visit is dropped, the patient can be reconnected by: TELEPHONE VISIT: Phone number: [phone]    Will anyone else be joining the visit? NO  (If patient encounters technical issues they should call [phone])    Are changes needed to the allergy or medication list? Yes Due to time, we didn't review meds/allergies.    Are refills needed on medications prescribed by this physician? NO    Rooming Documentation:  Not applicable      Reason for visit: RECHECK    Wt other than 24 hrs:    Pain more than one location:    [facility]

## 2024-11-14 NOTE — PATIENT INSTRUCTIONS
Recommendations from MTM Pharmacist visit:                                                    MTM (medication therapy management) is a service provided by a clinical pharmacist designed to help you get the most of out of your medicines.  You may be sent a phone or email survey evaluating today's visit.  Please provide feedback you have for the service he received today if you are able.      Reduce Zepbound to 7.5 mg weekly to help maintain weight loss. Work on lifestyle modifications we discussed. 90 day supply sent to [facility]     To help with tolerability and effectiveness of Zepbound :  Eat 3 meals with protein focus daily to help with nausea. If you forget to eat, you may feel nausea as a hunger cue.  Focus on getting protein in first with each meal and snack.   A good starting goal is 60 g protein daily (track this, especially if at weight loss plateau). Once you consistently are getting 60g daily, try getting 90 g protein daily.  Drink plenty of water - goal 64 oz throughout the day  You may try Metamucil, Benefiber, or Citrucel to help feel more full (less nausea) and have softer, more consistent bowel movements.  To optimize weight management - work on incorporating resistance training/weight lifting to build muscle and improve overall metabolism of adipose tissue.    Options for continuing GLP1/GIP agonist in 2025:  Pay out of pocket for Wegovy or Zepbound pens (>$1000/month cash price without savings card)   Zepbound cost with Zepbound savings card (link below to sign up for this): $650/month with card  https://[email or web address].zepbound.WealthTouch.com/enroll/checkEnrollment  Wegovy cost with Wegovy savings card (link below to sign up for this): $650/month with card   https://www.[employer]/coverage-and-savings/save-on-wegovy.html  Compounded semaglutide (same active ingredient as Wegovy) from [facility] ($230/month for doses of 1mg or less; $370/month for doses  "higher than 1mg)  This is an available option for as long as Wegovy is on FDA shortage list, Wegovy has been on the list for the last several months with no foreseeable end in sight as of now   Zepbound Vials through [facility] CASH PAY pharmacy - vials only available in 2.5 mg and 5 mg doses  $399/month for 2.5mg vials  $549/month for 5mg vials   $5 per month for administrations supplies (syringe/needles, etc)     Follow-up: with Padmini Thompson PA-C to help with weight management.      You may talk to your PCP about referral to [facility] if you would like to re-establish with me. I would be happy to have you back and have you follow with one of our amazing weight management providers to support your PCP and you as well!        It was great speaking with you today.  I value your experience and would be very thankful for your time in providing feedback in our clinic survey. In the next few days, you may receive an email or text message from [facility] with a link to a survey related to your  clinical pharmacist.\"     To schedule another MTM appointment, please call the clinic directly ([facility] Phone Number: [phone] (schedules for [facility] and [facility] - providers, dietitians, health coaches) or you may call the MTM scheduling line at [phone] or toll-free at [phone].     My Clinical Pharmacist's contact information:                                                      Please feel free to contact me with any questions or concerns you have.      Ashleigh Marquez, Pharm D., MPH    Medication Therapy Management Pharmacist   [facility]          Meal Replacement Shake Options:   *Protein Shake Criteria: no more than 210 Calories, at least 20 grams of protein, and less than 10 grams of sugar   Premier Protein (160 Calories, 30 g protein)  Slim Fast Advanced Nutrition (180 Calories, 20 " g protein)  Muscle Milk, lactose-free, 17 oz bottle (210 Calories, 30 g protein)  Integrated Supplements, no artificial sugars (110 Calories, 20 g protein)  Boost/Ensure Max (160 calories, 30 gm protein)   Fairlife Protein Shakes (160-230 calories, 26-42 gm protein)  Aldi's Elevation Protein Powder (180 calories, 30 gm protein)   Orgain Protein Shakes (130-160 calories, 20-26 gm protein)     Meal Replacement Bar Options:  Quest Protein Bars (190 Calories, 20 g protein)  Built Bar (170 Calories, 15-20 g protein)  One Protein Bar (210 calories, 20 g protein)  [place] Signature Protein Bar ([employer]) (190 Calories, 21 g protein)  Pure Protein Bars (180 Calories, 21 g protein)    Low Calorie Frozen Meal:  Healthy Choice Power Bowls  Lean Cuisine  Smart Ones  Gee Ovalle      ---------------------------------------------------------------  Tips to Increase the Protein in Your Diet  You may need more protein in your diet to help you heal from an illness, surgery or wound. Extra protein can also help you gain weight. Here are some ideas for adding high-protein foods to your meals.  Meat and fish  Add chopped cooked meat to vegetables, salads, casseroles, soups, sauces and biscuit dough.  Use in omelets, soufflés, quiches, sandwich fillings and chicken or turkey stuffing.  Wrap in pie crust or biscuit dough to make a turnover.  Add to stuffed baked potatoes.  Make a dip with diced meat or flaked fish mixed with sour cream and spices.  Chopped, hard-cooked eggs  Add to salads.  Use for snacks and sandwich filling.  High-protein milk  To make high-protein milk, mix 1 quart whole milk with 1 cup powdered milk.  Add to cream soups, mashed potatoes, scrambled eggs, cereals and dried eggnog mix.  Use as an ingredient in puddings, custards, hot chocolate, milk shakes and pancakes.  Powdered milk  If you don't have any high-protein milk on hand, you can use powdered milk. Add 3 tablespoons to:  gravies, sauces, cream soups,  mayonnaise  casseroles, meat patties, meatloaf, tuna salad, deviled ham  scalloped or mashed potatoes, creamed spinach  scrambled eggs, egg salad  cereals  yogurt, milk drinks, ice cream, frozen desserts, puddings, custards.  Add 4 to 6 tablespoons powdered milk to make:  cream sauces  breads, muffins, pancakes, waffles, cookies, cakes  cream pies, frostings, cake fillings  fruit cobblers, bread or rice pudding, gelatin desserts.  For high-protein eggnog, add 3 to 6 tablespoons powdered milk to prepared eggnog.  Hard or soft cheese  Melt on sandwiches, breads, tortillas, hamburgers, hot dogs, other meats, vegetables, eggs and pies.  Grate into soups, chili, sauces, casseroles, vegetables, potatoes, rice, noodles or meatloaf.  Eat with toast or crackers, or melt for tamar dip.  Cottage cheese or ricotta cheese  Mix with or scoop on top of fruits and vegetables.  Add to casseroles, lasagna, spaghetti, noodles and egg dishes (omelets, scrambled eggs, soufflés).  Use in gelatin, pudding-type desserts, cheesecake and pancake batter.  Use to stuff crepes, pasta shells or manicotti.  Fruit yogurt  Blend with fruits for a fruit smoothie.  Use as a dip for fruits and vegetables.  Scoop on top of pancakes or waffles.  Tofu  Blend silken tofu with fruits and juices for a smoothie.  Add chunks of firm tofu to soups and stews, or crumble into meatloaf.  Blend dried onion soup mix into soft or silken tofu for dip.  Use pureed silken tofu for part of the mayonnaise, sour cream, cream cheese or ricotta cheese called for in recipes.  Beans  Use cooked beans or peas in soups, casseroles, pasta, tacos and burritos.  Nuts and seeds  Note: For children under 3, discuss with the child's care team.  Use in casseroles, breads, muffins, pancakes, cookies and waffles.  Sprinkle on fruits, cereals, ice cream, yogurt, vegetables and salads.  Mix with raisins, dried fruits and chocolate chips for a snack.  Nut butters  Note: For children under  3, discuss with the child's care team.  Spread on sandwiches, toast, muffins, crackers, waffles, pancakes and fruit slices.  Use as a dip for raw vegetables.  Blend with milk drinks, or swirl through ice cream, yogurt or hot cereal.  Nutrition supplements (nutrition bars, drinks and powders)  Add powders to milk drinks and desserts.  Mix with ice cream, milk and fruit for a high-protein milk shake.    For informational purposes only. Not to replace the advice of your health care provider. Clinically reviewed by Nemo Bertrand, LUISA, LD, and the Clinical Nutrition Service Line. Copyright   2005 [facility]. All rights reserved. [employer] 811639 - REV 04/24.      -----------------------------------------------------------------------------------------------------------------  Learning About High-Protein Foods  What foods are high in protein?     The foods you eat contain nutrients, such as vitamins and minerals. Protein is a nutrient. Your body needs the right amount to stay healthy and work as it should. You can use the list below to help you make choices about which foods to eat.  Here are some examples of foods that are high in protein.  Dairy and dairy alternatives  Cheese  Milk  Soy milk  Yogurt (especially Greek)  Meat  Beef  Chicken  Ham  Lamb  Lunch meat  Pork  Sausage  Turkey  Other protein foods  Beans (black, garbanzo, kidney, lima)  Eggs  Hummus  Lentils  Nuts  Peanut butter and other nut butters  Peas  Soybeans  Tofu  Veggie or soy daina (Check the nutrition label for the amount of protein in each serving.)  Seafood  Anchovies  Cod  Crab  Halibut  [place]  Sardines  Shrimp  Tilapia  [place]  Tuna  Protein supplements  Bars (Check the nutrition label for the amount of protein in each serving.)  Drinks  Powders  Work with your doctor to find out how much of this nutrient you need. Depending on your health, you may need more or less of it in your diet.  Where can you learn more?  Go to  "https://www.healthThe [employer] Company.net/patiented  Enter P335 in the search box to learn more about \"Learning About High-Protein Foods.\"  Current as of: September 20, 2023               Content Version: 14.0    5785-5639 [employer]wise, [employer].   Care instructions adapted under license by your healthcare professional. If you have questions about a medical condition or this instruction, always ask your healthcare professional. [employer] disclaims any warranty or liability for your use of this information.         "

## 2024-11-14 NOTE — PROGRESS NOTES
Medication Therapy Management (MTM) Encounter    ASSESSMENT:                            Medication Adherence/Access: education provided today of [facility] insurance requirements changing - stopping GLP1/GIP Agonist coverage for weight management in 2025.   Discussed options for continuing GLP1/GIP agonist in 2025.      Weight management :    Patient congratulated on >6% total body weight loss and lifestyle modifications.     Patient would benefit from continuing pharmacotherapy for weight management. Given tolerating well, class I obesity, recommend optimizing GLP1/GIP therapy as data to support most significant weight loss and patient has no contraindications. Patient also realizing benefit from reduction in food noise and increased satiety. Education provided on continued dietary and behavioral modifications.    Due to [facility] insurance discontinuing coverage of GLP1 agonists for Weight Management in 2025 year, much of today's discussion was spent discussing next steps including alternative injectable options - paying out of pocket w/ savings card cost, compound product through [facility] [place] [facility], and others. Discussed risk/benefit of continuing high dose GLP1/GIP agonist  or tapering to lower risk of weight regain/cycling as suggested from some studies.  From this, patient wishing to  taper off Zepbound - not able to afford in 2025 .        PLAN:                            Reduce Zepbound to 7.5 mg weekly to help maintain weight loss. Work on lifestyle modifications we discussed. 90 day supply sent to [facility]     To help with tolerability and effectiveness of Zepbound :  Eat 3 meals with protein focus daily to help with nausea. If you forget to eat, you may feel nausea as a hunger cue.  Focus on getting protein in first with each meal and snack.   A good starting goal is 60 g protein daily (track this, especially if at weight loss plateau). Once you  consistently are getting 60g daily, try getting 90 g protein daily.  Drink plenty of water - goal 64 oz throughout the day  You may try Metamucil, Benefiber, or Citrucel to help feel more full (less nausea) and have softer, more consistent bowel movements.  To optimize weight management - work on incorporating resistance training/weight lifting to build muscle and improve overall metabolism of adipose tissue.    Options for continuing GLP1/GIP agonist in 2025:  Pay out of pocket for Wegovy or Zepbound pens (>$1000/month cash price without savings card)   Zepbound cost with Zepbound savings card (link below to sign up for this): $650/month with card  https://[email or web address].zepbound.[employer] [place]enroll/checkEnrollment  Wegovy cost with Wegovy savings card (link below to sign up for this): $650/month with card   https://www.[employer]/coverage-and-savings/save-on-wegovy.html  Compounded semaglutide (same active ingredient as Wegovy) from [facility] ($230/month for doses of 1mg or less; $370/month for doses higher than 1mg)  This is an available option for as long as Wegovy is on FDA shortage list, Wegovy has been on the list for the last several months with no foreseeable end in sight as of now   Zepbound Vials through [facility] - vials only available in 2.5 mg and 5 mg doses  $399/month for 2.5mg vials  $549/month for 5mg vials   $5 per month for administrations supplies (syringe/needles, etc)     Follow-up: with Padmini Thompson PA-C to help with weight management.      You may talk to your PCP about referral to [facility] if you would like to re-establish with me. I would be happy to have you back and have you follow with one of our amazing weight management providers to support your PCP and you as well!      SUBJECTIVE/OBJECTIVE:                          Neptali Ospina is a 54 year old male seen for a follow-up visit.     Patient previously seen by  "Justin Carver, PharmD.       Reason for visit: [facility] insurance requirements - GLP1/GIP Agonist Management .    Allergies/ADRs: Reviewed in chart  Past Medical History: Reviewed in chart  Tobacco: He reports that he has quit smoking. His smoking use included cigarettes. He has never used smokeless tobacco.  Alcohol: rare - reduced due to NAFLD concerns    Medication Adherence/Access: Medication barriers: obtaining medication from insurance.     Weight Management   Zepbound 10 mg once weekly x 3 months    Follows with Padmini Thompson PA-C in [place] for weight management - last visit 4/3/24    Patient reports no current medication side effects. Helps reduce food noise, think more clearly and better overall health in addition to weight loss.    Very frustrated with [facility] insurance requirements changing in 2025. Feels that mental health (mental capacity/food noise) and physical health will be negatively be impacted.    Nutrition/Eating Habits:   Protein shake for breakfast, no more snacking, protein and fruit and veggies for meals (1-2 meals per day).    Water: carries water bottle and now 2 x 16.9 oz bottles day      Exercise/Activity: works out every day - elliptical and lifting weights several days per week.     Medications Tried/Failed/considerations:  MEN2/Medullary Thyroid Cancer: Negative   Pancreatitis: Negative   Baseline GI symptomatology: Recent gall bladder removal, symptoms resolved. No GI adverse effects. Notes some lactose intolerance.      Initial Consult Weight: 235 lb     Current weight today: 219 lbs 0 oz  Cumulative Weight Loss: -16 lb, -6.8% from baseline    Wt Readings from Last 4 Encounters:   11/14/24 219 lb (99.3 kg)   08/08/24 222 lb (100.7 kg)   06/04/24 220 lb (99.8 kg)   05/09/24 220 lb (99.8 kg)     Estimated body mass index is 32.34 kg/m  as calculated from the following:    Height as of this encounter: 5' 9\" (1.753 m).    Weight as of this encounter: 219 lb (99.3 kg).    Lab Results " "  Component Value Date    A1C 5.2 01/05/2024     (H) 01/25/2024     01/25/2024    POTASSIUM 4.3 01/25/2024    TD 9.9 01/25/2024    CHLORIDE 101 01/25/2024    CO2 28 01/25/2024    BUN 14.4 01/25/2024    CR 0.89 01/25/2024    GFRESTIMATED >90 01/25/2024    CHOL 177 01/05/2024     (H) 01/05/2024    HDL 35 (L) 01/05/2024    TRIG 174 (H) 01/05/2024    VITDT 29 04/20/2016    TSH 3.12 04/26/2024     Liver Function Studies -   Recent Labs   Lab Test 01/25/24  1508   PROTTOTAL 8.2   ALBUMIN 4.9   BILITOTAL 0.7   ALKPHOS 67   AST 28   ALT 41             Today's Vitals: Ht 5' 9\" (1.753 m)   Wt 219 lb (99.3 kg)   BMI 32.34 kg/m    ----------------      I spent 35 minutes with this patient today. All changes were made via collaborative practice agreement with Keely Frias PA-C. A copy of the visit note was provided to the patient's provider(s).    A summary of these recommendations was sent via [employer].    Ashleigh Marquez, Pharm D., MPH    Medication Therapy Management Pharmacist   [facility]      Telemedicine Visit Details  The patient's medications can be safely assessed via a telemedicine encounter.  Type of service:  Telephone visit  Originating Location (pt. Location): Other gym    Distant Location (provider location):  Off-site  Start Time:  2:03 PM  End Time: 2:38 PM     Medication Therapy Recommendations  Obesity, Class I, BMI 30-34.9   1 Current Medication: tirzepatide-Weight Management (ZEPBOUND) 10 MG/0.5ML prefilled pen   Current Medication Sig: Inject 0.5 mLs (10 mg) subcutaneously every 7 days   Rationale: Medication product not available - Adherence - Adherence   Recommendation: Provide Adherence Intervention   Status: Accepted per CPA   Identified Date: 11/14/2024 Completed Date: 11/14/2024            "

## 2025-01-13 ENCOUNTER — HOSPITAL ENCOUNTER (EMERGENCY)
Facility: CLINIC | Age: 55
Discharge: HOME | End: 2025-01-13
Attending: EMERGENCY MEDICINE | Admitting: EMERGENCY MEDICINE
Payer: COMMERCIAL

## 2025-01-13 ENCOUNTER — APPOINTMENT (OUTPATIENT)
Dept: CT IMAGING | Facility: CLINIC | Age: 55
End: 2025-01-13
Attending: EMERGENCY MEDICINE
Payer: COMMERCIAL

## 2025-01-13 ENCOUNTER — APPOINTMENT (OUTPATIENT)
Dept: ULTRASOUND IMAGING | Facility: CLINIC | Age: 55
End: 2025-01-13
Attending: EMERGENCY MEDICINE
Payer: COMMERCIAL

## 2025-01-13 VITALS
HEART RATE: 100 BPM | OXYGEN SATURATION: 94 % | RESPIRATION RATE: 11 BRPM | SYSTOLIC BLOOD PRESSURE: 130 MMHG | HEIGHT: 68 IN | WEIGHT: 227.96 LBS | BODY MASS INDEX: 34.55 KG/M2 | TEMPERATURE: 98.7 F | DIASTOLIC BLOOD PRESSURE: 73 MMHG

## 2025-01-13 DIAGNOSIS — K76.0 HEPATIC STEATOSIS: ICD-10-CM

## 2025-01-13 DIAGNOSIS — I26.93 SINGLE SUBSEGMENTAL PULMONARY EMBOLISM WITHOUT ACUTE COR PULMONALE (H): ICD-10-CM

## 2025-01-13 DIAGNOSIS — J18.9 COMMUNITY ACQUIRED PNEUMONIA OF RIGHT LOWER LOBE OF LUNG: ICD-10-CM

## 2025-01-13 LAB
ALBUMIN SERPL BCG-MCNC: 4.5 G/DL (ref 3.5–5.2)
ALBUMIN UR-MCNC: 100 MG/DL
ALP SERPL-CCNC: 70 U/L (ref 40–150)
ALT SERPL W P-5'-P-CCNC: 19 U/L (ref 0–70)
ANION GAP SERPL CALCULATED.3IONS-SCNC: 15 MMOL/L (ref 7–15)
APPEARANCE UR: CLEAR
AST SERPL W P-5'-P-CCNC: 17 U/L (ref 0–45)
ATRIAL RATE - MUSE: 129 BPM
BASOPHILS # BLD AUTO: 0 10E3/UL (ref 0–0.2)
BASOPHILS NFR BLD AUTO: 0 %
BILIRUB SERPL-MCNC: 0.6 MG/DL
BILIRUB UR QL STRIP: NEGATIVE
BUN SERPL-MCNC: 13.7 MG/DL (ref 6–20)
CALCIUM SERPL-MCNC: 9.7 MG/DL (ref 8.8–10.4)
CHLORIDE SERPL-SCNC: 102 MMOL/L (ref 98–107)
COLOR UR AUTO: YELLOW
CREAT SERPL-MCNC: 0.92 MG/DL (ref 0.67–1.17)
D DIMER PPP FEU-MCNC: 2.98 UG/ML FEU (ref 0–0.5)
DIASTOLIC BLOOD PRESSURE - MUSE: NORMAL MMHG
EGFRCR SERPLBLD CKD-EPI 2021: >90 ML/MIN/1.73M2
EOSINOPHIL # BLD AUTO: 0.1 10E3/UL (ref 0–0.7)
EOSINOPHIL NFR BLD AUTO: 1 %
ERYTHROCYTE [DISTWIDTH] IN BLOOD BY AUTOMATED COUNT: 12.6 % (ref 10–15)
FLUAV RNA SPEC QL NAA+PROBE: NEGATIVE
FLUBV RNA RESP QL NAA+PROBE: NEGATIVE
GLUCOSE SERPL-MCNC: 135 MG/DL (ref 70–99)
GLUCOSE UR STRIP-MCNC: NEGATIVE MG/DL
HCO3 SERPL-SCNC: 22 MMOL/L (ref 22–29)
HCT VFR BLD AUTO: 44.8 % (ref 40–53)
HGB BLD-MCNC: 15 G/DL (ref 13.3–17.7)
HGB UR QL STRIP: NEGATIVE
IMM GRANULOCYTES # BLD: 0.1 10E3/UL
IMM GRANULOCYTES NFR BLD: 1 %
INTERPRETATION ECG - MUSE: NORMAL
KETONES UR STRIP-MCNC: NEGATIVE MG/DL
LACTATE SERPL-SCNC: 2 MMOL/L (ref 0.7–2)
LEUKOCYTE ESTERASE UR QL STRIP: NEGATIVE
LIPASE SERPL-CCNC: 31 U/L (ref 13–60)
LYMPHOCYTES # BLD AUTO: 2.2 10E3/UL (ref 0.8–5.3)
LYMPHOCYTES NFR BLD AUTO: 15 %
MCH RBC QN AUTO: 29.6 PG (ref 26.5–33)
MCHC RBC AUTO-ENTMCNC: 33.5 G/DL (ref 31.5–36.5)
MCV RBC AUTO: 89 FL (ref 78–100)
MONOCYTES # BLD AUTO: 1.5 10E3/UL (ref 0–1.3)
MONOCYTES NFR BLD AUTO: 11 %
MUCOUS THREADS #/AREA URNS LPF: PRESENT /LPF
NEUTROPHILS # BLD AUTO: 10.3 10E3/UL (ref 1.6–8.3)
NEUTROPHILS NFR BLD AUTO: 72 %
NITRATE UR QL: NEGATIVE
NRBC # BLD AUTO: 0 10E3/UL
NRBC BLD AUTO-RTO: 0 /100
NT-PROBNP SERPL-MCNC: 55 PG/ML (ref 0–900)
P AXIS - MUSE: 35 DEGREES
PH UR STRIP: 5.5 [PH] (ref 5–7)
PLATELET # BLD AUTO: 263 10E3/UL (ref 150–450)
POTASSIUM SERPL-SCNC: 4.1 MMOL/L (ref 3.4–5.3)
PR INTERVAL - MUSE: 170 MS
PROCALCITONIN SERPL IA-MCNC: 0.11 NG/ML
PROT SERPL-MCNC: 8.1 G/DL (ref 6.4–8.3)
QRS DURATION - MUSE: 64 MS
QT - MUSE: 282 MS
QTC - MUSE: 413 MS
R AXIS - MUSE: 17 DEGREES
RADIOLOGIST FLAGS: ABNORMAL
RBC # BLD AUTO: 5.06 10E6/UL (ref 4.4–5.9)
RBC URINE: 2 /HPF
RSV RNA SPEC NAA+PROBE: NEGATIVE
SARS-COV-2 RNA RESP QL NAA+PROBE: NEGATIVE
SODIUM SERPL-SCNC: 139 MMOL/L (ref 135–145)
SP GR UR STRIP: 1.03 (ref 1–1.03)
SYSTOLIC BLOOD PRESSURE - MUSE: NORMAL MMHG
T AXIS - MUSE: 49 DEGREES
TROPONIN T SERPL HS-MCNC: 7 NG/L
TROPONIN T SERPL HS-MCNC: 8 NG/L
UROBILINOGEN UR STRIP-MCNC: NORMAL MG/DL
VENTRICULAR RATE- MUSE: 129 BPM
WBC # BLD AUTO: 14.3 10E3/UL (ref 4–11)
WBC URINE: 4 /HPF

## 2025-01-13 PROCEDURE — 84484 ASSAY OF TROPONIN QUANT: CPT | Performed by: EMERGENCY MEDICINE

## 2025-01-13 PROCEDURE — 99291 CRITICAL CARE FIRST HOUR: CPT | Mod: 25

## 2025-01-13 PROCEDURE — 93005 ELECTROCARDIOGRAM TRACING: CPT

## 2025-01-13 PROCEDURE — 96366 THER/PROPH/DIAG IV INF ADDON: CPT

## 2025-01-13 PROCEDURE — 83880 ASSAY OF NATRIURETIC PEPTIDE: CPT | Performed by: EMERGENCY MEDICINE

## 2025-01-13 PROCEDURE — 96361 HYDRATE IV INFUSION ADD-ON: CPT

## 2025-01-13 PROCEDURE — 85004 AUTOMATED DIFF WBC COUNT: CPT | Performed by: EMERGENCY MEDICINE

## 2025-01-13 PROCEDURE — 36415 COLL VENOUS BLD VENIPUNCTURE: CPT | Performed by: EMERGENCY MEDICINE

## 2025-01-13 PROCEDURE — 250N000013 HC RX MED GY IP 250 OP 250 PS 637: Performed by: EMERGENCY MEDICINE

## 2025-01-13 PROCEDURE — 76705 ECHO EXAM OF ABDOMEN: CPT

## 2025-01-13 PROCEDURE — 71275 CT ANGIOGRAPHY CHEST: CPT

## 2025-01-13 PROCEDURE — 96365 THER/PROPH/DIAG IV INF INIT: CPT | Mod: 59

## 2025-01-13 PROCEDURE — 96375 TX/PRO/DX INJ NEW DRUG ADDON: CPT

## 2025-01-13 PROCEDURE — 96376 TX/PRO/DX INJ SAME DRUG ADON: CPT

## 2025-01-13 PROCEDURE — 82310 ASSAY OF CALCIUM: CPT | Performed by: EMERGENCY MEDICINE

## 2025-01-13 PROCEDURE — 250N000011 HC RX IP 250 OP 636: Performed by: EMERGENCY MEDICINE

## 2025-01-13 PROCEDURE — 85014 HEMATOCRIT: CPT | Performed by: EMERGENCY MEDICINE

## 2025-01-13 PROCEDURE — 250N000009 HC RX 250: Performed by: EMERGENCY MEDICINE

## 2025-01-13 PROCEDURE — 84145 PROCALCITONIN (PCT): CPT | Performed by: EMERGENCY MEDICINE

## 2025-01-13 PROCEDURE — 85049 AUTOMATED PLATELET COUNT: CPT | Performed by: EMERGENCY MEDICINE

## 2025-01-13 PROCEDURE — 258N000003 HC RX IP 258 OP 636: Performed by: EMERGENCY MEDICINE

## 2025-01-13 PROCEDURE — 84155 ASSAY OF PROTEIN SERUM: CPT | Performed by: EMERGENCY MEDICINE

## 2025-01-13 PROCEDURE — 81003 URINALYSIS AUTO W/O SCOPE: CPT | Performed by: EMERGENCY MEDICINE

## 2025-01-13 PROCEDURE — 83605 ASSAY OF LACTIC ACID: CPT | Performed by: EMERGENCY MEDICINE

## 2025-01-13 PROCEDURE — 74177 CT ABD & PELVIS W/CONTRAST: CPT

## 2025-01-13 PROCEDURE — 96368 THER/DIAG CONCURRENT INF: CPT

## 2025-01-13 PROCEDURE — 85379 FIBRIN DEGRADATION QUANT: CPT | Performed by: EMERGENCY MEDICINE

## 2025-01-13 PROCEDURE — 87637 SARSCOV2&INF A&B&RSV AMP PRB: CPT | Performed by: EMERGENCY MEDICINE

## 2025-01-13 PROCEDURE — 83690 ASSAY OF LIPASE: CPT | Performed by: EMERGENCY MEDICINE

## 2025-01-13 RX ORDER — HYDROMORPHONE HCL IN WATER/PF 6 MG/30 ML
0.2 PATIENT CONTROLLED ANALGESIA SYRINGE INTRAVENOUS
Status: DISCONTINUED | OUTPATIENT
Start: 2025-01-13 | End: 2025-01-13 | Stop reason: HOSPADM

## 2025-01-13 RX ORDER — HEPARIN SODIUM 10000 [USP'U]/100ML
0-5000 INJECTION, SOLUTION INTRAVENOUS CONTINUOUS
Status: DISCONTINUED | OUTPATIENT
Start: 2025-01-13 | End: 2025-01-13

## 2025-01-13 RX ORDER — IBUPROFEN 600 MG/1
600 TABLET, FILM COATED ORAL ONCE
Status: COMPLETED | OUTPATIENT
Start: 2025-01-13 | End: 2025-01-13

## 2025-01-13 RX ORDER — KETOROLAC TROMETHAMINE 15 MG/ML
15 INJECTION, SOLUTION INTRAMUSCULAR; INTRAVENOUS ONCE
Status: COMPLETED | OUTPATIENT
Start: 2025-01-13 | End: 2025-01-13

## 2025-01-13 RX ORDER — IOPAMIDOL 755 MG/ML
500 INJECTION, SOLUTION INTRAVASCULAR ONCE
Status: COMPLETED | OUTPATIENT
Start: 2025-01-13 | End: 2025-01-13

## 2025-01-13 RX ORDER — HYDROCODONE BITARTRATE AND ACETAMINOPHEN 5; 325 MG/1; MG/1
1 TABLET ORAL EVERY 6 HOURS PRN
Qty: 7 TABLET | Refills: 0 | Status: SHIPPED | OUTPATIENT
Start: 2025-01-13

## 2025-01-13 RX ORDER — HYDROCODONE BITARTRATE AND ACETAMINOPHEN 5; 325 MG/1; MG/1
2 TABLET ORAL ONCE
Status: COMPLETED | OUTPATIENT
Start: 2025-01-13 | End: 2025-01-13

## 2025-01-13 RX ORDER — ONDANSETRON 2 MG/ML
4 INJECTION INTRAMUSCULAR; INTRAVENOUS
Status: DISCONTINUED | OUTPATIENT
Start: 2025-01-13 | End: 2025-01-13 | Stop reason: HOSPADM

## 2025-01-13 RX ORDER — CEFTRIAXONE 2 G/1
2 INJECTION, POWDER, FOR SOLUTION INTRAMUSCULAR; INTRAVENOUS ONCE
Status: COMPLETED | OUTPATIENT
Start: 2025-01-13 | End: 2025-01-13

## 2025-01-13 RX ORDER — AZITHROMYCIN 250 MG/1
TABLET, FILM COATED ORAL
Qty: 6 TABLET | Refills: 0 | Status: SHIPPED | OUTPATIENT
Start: 2025-01-13 | End: 2025-01-18

## 2025-01-13 RX ORDER — AMOXICILLIN 500 MG/1
1000 CAPSULE ORAL 3 TIMES DAILY
Qty: 30 CAPSULE | Refills: 0 | Status: SHIPPED | OUTPATIENT
Start: 2025-01-13 | End: 2025-01-18

## 2025-01-13 RX ORDER — AZITHROMYCIN 500 MG/5ML
500 INJECTION, POWDER, LYOPHILIZED, FOR SOLUTION INTRAVENOUS ONCE
Status: COMPLETED | OUTPATIENT
Start: 2025-01-13 | End: 2025-01-13

## 2025-01-13 RX ADMIN — KETOROLAC TROMETHAMINE 15 MG: 15 INJECTION, SOLUTION INTRAMUSCULAR; INTRAVENOUS at 07:51

## 2025-01-13 RX ADMIN — SODIUM CHLORIDE 100 ML: 9 INJECTION, SOLUTION INTRAVENOUS at 09:53

## 2025-01-13 RX ADMIN — CEFTRIAXONE 2 G: 2 INJECTION, POWDER, FOR SOLUTION INTRAMUSCULAR; INTRAVENOUS at 11:07

## 2025-01-13 RX ADMIN — IBUPROFEN 600 MG: 600 TABLET, FILM COATED ORAL at 06:56

## 2025-01-13 RX ADMIN — AZITHROMYCIN MONOHYDRATE 500 MG: 500 INJECTION, POWDER, LYOPHILIZED, FOR SOLUTION INTRAVENOUS at 11:54

## 2025-01-13 RX ADMIN — SODIUM CHLORIDE 1000 ML: 9 INJECTION, SOLUTION INTRAVENOUS at 10:27

## 2025-01-13 RX ADMIN — HYDROCODONE BITARTRATE AND ACETAMINOPHEN 2 TABLET: 5; 325 TABLET ORAL at 12:40

## 2025-01-13 RX ADMIN — HYDROMORPHONE HYDROCHLORIDE 0.2 MG: 0.2 INJECTION, SOLUTION INTRAMUSCULAR; INTRAVENOUS; SUBCUTANEOUS at 09:20

## 2025-01-13 RX ADMIN — IOPAMIDOL 100 ML: 755 INJECTION, SOLUTION INTRAVENOUS at 09:53

## 2025-01-13 RX ADMIN — RIVAROXABAN 15 MG: 15 TABLET, FILM COATED ORAL at 14:07

## 2025-01-13 RX ADMIN — HEPARIN SODIUM 1800 UNITS/HR: 10000 INJECTION, SOLUTION INTRAVENOUS at 11:18

## 2025-01-13 ASSESSMENT — ACTIVITIES OF DAILY LIVING (ADL)
ADLS_ACUITY_SCORE: 46

## 2025-01-13 ASSESSMENT — COLUMBIA-SUICIDE SEVERITY RATING SCALE - C-SSRS
1. IN THE PAST MONTH, HAVE YOU WISHED YOU WERE DEAD OR WISHED YOU COULD GO TO SLEEP AND NOT WAKE UP?: NO
6. HAVE YOU EVER DONE ANYTHING, STARTED TO DO ANYTHING, OR PREPARED TO DO ANYTHING TO END YOUR LIFE?: NO
2. HAVE YOU ACTUALLY HAD ANY THOUGHTS OF KILLING YOURSELF IN THE PAST MONTH?: NO

## 2025-01-13 NOTE — ED NOTES
"[facility]  ED Nurse Handoff Report    ED Chief complaint: Back Pain  . ED Diagnosis:   Final diagnoses:   None       Allergies: No Known Allergies    Code Status: Full Code    Activity level - Baseline/Home:  independent.  Activity Level - Current:   independent.   Lift room needed: No.   Bariatric: No   Needed: No   Isolation: No.   Infection: Not Applicable.     Respiratory status: Room air    Vital Signs (within 30 minutes):   Vitals:    01/13/25 0654 01/13/25 0920 01/13/25 1030   BP: (!) 162/112 (!) 175/107 (!) 154/105   Pulse: (!) 135 (!) 123 120   Resp: 20     Temp: 100.3  F (37.9  C)     TempSrc: Temporal     SpO2: 97% 96% 96%   Weight: 103.4 kg (227 lb 15.3 oz)     Height: 1.727 m (5' 8\")         Cardiac Rhythm:  ,      Pain level:    Patient confused: No.   Patient Falls Risk: activity supervised, mobility aid in reach, and room door open.   Elimination Status: Has voided     Patient Report - Initial Complaint: back pain/fever.   Focused Assessment:  Neptali Ospina is a 54 year old male who presents to the ED for evaluation of back pain. The patient reports that a couple of days ago he developed a fever and pain under his right lung that radiates to his lower right back. He notes that the pain is more significant in the front rather than the back. His pain comes and goes and is worsened when he sits down or takes a deep breath. Patient includes that he has a history of pneumonia, which presented similarly. Denies chest pain, shortness of breath, cough, vomiting, or urinary symptoms. He does endorse loose stools, but denies black or bloody stool. Patient includes history of v-tach, which he got an ablation for, along with history of cholecystectomy. Patient still has his appendix.    Physical Exam  General: Alert, no acute distress  HEENT:  Moist mucous membranes.  Conjunctiva normal.   CV:  Tachycardic, regular rhythm, no m/r/g, skin warm and well perfused  Pulm:  CTAB, " no wheezes/ronchi/rales.  No acute distress, breathing comfortably  GI:  Soft, RUQ tenderness, nondistended.  No rebound or guarding.   MSK:  Moving all extremities.  No focal areas of edema, erythema, no CVA tenderness  Skin:  WWP, no rashes, no lower extremity edema, skin color normal, no diaphoresis        Abnormal Results:   Labs Ordered and Resulted from Time of ED Arrival to Time of ED Departure   ROUTINE UA WITH MICROSCOPIC REFLEX TO CULTURE - Abnormal       Result Value    Color Urine Yellow      Appearance Urine Clear      Glucose Urine Negative      Bilirubin Urine Negative      Ketones Urine Negative      Specific Gravity Urine 1.034      Blood Urine Negative      pH Urine 5.5      Protein Albumin Urine 100 (*)     Urobilinogen Urine Normal      Nitrite Urine Negative      Leukocyte Esterase Urine Negative      Mucus Urine Present (*)     RBC Urine 2      WBC Urine 4     COMPREHENSIVE METABOLIC PANEL - Abnormal    Sodium 139      Potassium 4.1      Carbon Dioxide (CO2) 22      Anion Gap 15      Urea Nitrogen 13.7      Creatinine 0.92      GFR Estimate >90      Calcium 9.7      Chloride 102      Glucose 135 (*)     Alkaline Phosphatase 70      AST 17      ALT 19      Protein Total 8.1      Albumin 4.5      Bilirubin Total 0.6     D DIMER QUANTITATIVE - Abnormal    D-Dimer Quantitative 2.98 (*)    CBC WITH PLATELETS AND DIFFERENTIAL - Abnormal    WBC Count 14.3 (*)     RBC Count 5.06      Hemoglobin 15.0      Hematocrit 44.8      MCV 89      MCH 29.6      MCHC 33.5      RDW 12.6      Platelet Count 263      % Neutrophils 72      % Lymphocytes 15      % Monocytes 11      % Eosinophils 1      % Basophils 0      % Immature Granulocytes 1      NRBCs per 100 WBC 0      Absolute Neutrophils 10.3 (*)     Absolute Lymphocytes 2.2      Absolute Monocytes 1.5 (*)     Absolute Eosinophils 0.1      Absolute Basophils 0.0      Absolute Immature Granulocytes 0.1      Absolute NRBCs 0.0     INFLUENZA A/B, RSV AND  SARS-COV2 PCR - Normal    Influenza A PCR Negative      Influenza B PCR Negative      RSV PCR Negative      SARS CoV2 PCR Negative     LACTIC ACID WHOLE BLOOD WITH 1X REPEAT IN 2 HR WHEN >2 - Normal    Lactic Acid, Initial 2.0     LIPASE - Normal    Lipase 31     PROCALCITONIN   TROPONIN T, HIGH SENSITIVITY   NT PROBNP INPATIENT        CT Chest (PE) Abdomen Pelvis w Contrast   Final Result   Abnormal   IMPRESSION:   1.  Segmental pulmonary embolism is seen in the upper lobe branch of the left pulmonary artery. No evidence of right-sided heart strain.   2.  Small right pleural effusion. Multiple bandlike consolidative opacities are seen in the lower lungs, right greater than left. A few patchy groundglass opacities are also seen in the right lower lobe. Findings likely due to subsegmental atelectasis    but superimposed pneumonia cannot be excluded. Suggest clinical correlation.   3.  5 mm hyperdense nodule is noted near the surgical bed in close proximity to the suspected remnant cystic duct. Findings could reflect a prominent vessel but residual stone cannot be excluded. Suggest further characterization with right upper quadrant    ultrasound.   4.  Mild, diffuse wall thickening urinary bladder, likely due to sequela of outlet obstruction given prostatomegaly.         [Critical Result: New diagnosis of pulmonary embolism]      Finding was identified on 1/13/2025 10:12 AM CST.       Dr. Fountain was contacted by me on 1/13/2025 10:31 AM CST and verbalized understanding of the critical result.      US Abdomen Limited    (Results Pending)       Treatments provided: see above see mar   Family Comments: none   OBS brochure/video discussed/provided to patient:  Yes  ED Medications:   Medications   ondansetron (ZOFRAN) injection 4 mg (4 mg Intravenous Not Given 1/13/25 7267)   HYDROmorphone (DILAUDID) injection 0.2 mg (0.2 mg Intravenous $Given 1/13/25 3834)   sodium chloride 0.9% BOLUS 1,000 mL (1,000 mLs Intravenous $New  Bag 1/13/25 1027)   cefTRIAXone (ROCEPHIN) 2 g vial to attach to  ml bag for ADULTS or NS 50 ml bag for PEDS (has no administration in time range)   azithromycin (ZITHROMAX) 500 mg in  mL intermittent infusion (has no administration in time range)   heparin ANTICOAGULANT loading dose for  HIGH INTENSITY TREATMENT* Give BEFORE starting heparin infusion (has no administration in time range)   heparin 25,000 units in 0.45% NaCl 250 mL ANTICOAGULANT infusion (has no administration in time range)   ibuprofen (ADVIL/MOTRIN) tablet 600 mg (600 mg Oral $Given 1/13/25 0656)   ketorolac (TORADOL) injection 15 mg (15 mg Intravenous $Given 1/13/25 0751)   CT SCAN FLUSH (100 mLs Intravenous $Given 1/13/25 0953)   iopamidol (ISOVUE-370) solution 500 mL (100 mLs Intravenous $Given 1/13/25 0953)       Drips infusing:  Yes  For the majority of the shift this patient was Green.   Interventions performed were none .    Sepsis treatment initiated: No    Cares/treatment/interventions/medications to be completed following ED care: see above     ED Nurse Name: Rupa Bravo RN  10:53 AM

## 2025-01-13 NOTE — ED PROVIDER NOTES
Emergency Department Note      History of Present Illness     Chief Complaint   Back Pain    HPI   Neptali Ospina is a 54 year old male who presents to the ED for evaluation of back pain. The patient reports that a couple of days ago he developed a fever and pain under his right lung that radiates to his lower right back. He notes that the pain is more significant in the front rather than the back. His pain comes and goes and is worsened when he sits down or takes a deep breath. Patient includes that he has a history of pneumonia, which presented similarly. Denies chest pain, shortness of breath, cough, vomiting, or urinary symptoms. He does endorse loose stools, but denies black or bloody stool. Patient includes history of v-tach, which he got an ablation for, along with history of cholecystectomy. Patient still has his appendix.    Independent Historian   None    Review of External Notes   None    Past Medical History     Medical History and Problem List   HSV-1 infection  Nonalcoholic steatohepatitis  Obstructive sleep apnea  Paroxysmal ventricular tachycardia  Strabismus  Thyroid nodule  Vitamin D deficiency  White coat syndrome  Anxiety  Obesity  Adiposity   Hyperlipidemia     Medications   Lexapro  Zestril  Ativan  Zofran  Senna  Viagra  Zocor  Zepbound  Aspirin 81 mg    Surgical History   Colonoscopy with polypectomy  Heart catheterization with possible intervention  Robot-assisted laparoscopic cholecystectomy  EP ablation VT  PE tubes  Tonsillectomy and adenoidectomy  Vasectomy    Physical Exam     Patient Vitals for the past 24 hrs:   BP Temp Temp src Pulse Resp SpO2 Height Weight   01/13/25 1245 (!) 135/94 -- -- 107 29 95 % -- --   01/13/25 1230 (!) 135/92 -- -- 106 20 95 % -- --   01/13/25 1140 (!) 141/92 -- -- 113 16 94 % -- --   01/13/25 1130 (!) 153/91 -- -- 112 12 94 % -- --   01/13/25 1110 (!) 146/89 -- -- 105 19 93 % -- --   01/13/25 1030 (!) 154/105 -- -- 120 -- 96 % -- --   01/13/25 0920 (!)  "175/107 -- -- (!) 123 -- 96 % -- --   01/13/25 0654 (!) 162/112 100.3  F (37.9  C) Temporal (!) 135 20 97 % 1.727 m (5' 8\") 103.4 kg (227 lb 15.3 oz)     Physical Exam  General: Alert, no acute distress  HEENT:  Moist mucous membranes.  Conjunctiva normal.   CV:  Tachycardic, regular rhythm, no m/r/g, skin warm and well perfused  Pulm:  CTAB, no wheezes/ronchi/rales.  No acute distress, breathing comfortably  GI:  Soft, RUQ tenderness, nondistended.  No rebound or guarding.   MSK:  Moving all extremities.  No focal areas of edema, erythema, no CVA tenderness  Skin:  WWP, no rashes, no lower extremity edema, skin color normal, no diaphoresis    Diagnostics     Lab Results   Labs Ordered and Resulted from Time of ED Arrival to Time of ED Departure   ROUTINE UA WITH MICROSCOPIC REFLEX TO CULTURE - Abnormal       Result Value    Color Urine Yellow      Appearance Urine Clear      Glucose Urine Negative      Bilirubin Urine Negative      Ketones Urine Negative      Specific Gravity Urine 1.034      Blood Urine Negative      pH Urine 5.5      Protein Albumin Urine 100 (*)     Urobilinogen Urine Normal      Nitrite Urine Negative      Leukocyte Esterase Urine Negative      Mucus Urine Present (*)     RBC Urine 2      WBC Urine 4     COMPREHENSIVE METABOLIC PANEL - Abnormal    Sodium 139      Potassium 4.1      Carbon Dioxide (CO2) 22      Anion Gap 15      Urea Nitrogen 13.7      Creatinine 0.92      GFR Estimate >90      Calcium 9.7      Chloride 102      Glucose 135 (*)     Alkaline Phosphatase 70      AST 17      ALT 19      Protein Total 8.1      Albumin 4.5      Bilirubin Total 0.6     D DIMER QUANTITATIVE - Abnormal    D-Dimer Quantitative 2.98 (*)    CBC WITH PLATELETS AND DIFFERENTIAL - Abnormal    WBC Count 14.3 (*)     RBC Count 5.06      Hemoglobin 15.0      Hematocrit 44.8      MCV 89      MCH 29.6      MCHC 33.5      RDW 12.6      Platelet Count 263      % Neutrophils 72      % Lymphocytes 15      % Monocytes " 11      % Eosinophils 1      % Basophils 0      % Immature Granulocytes 1      NRBCs per 100 WBC 0      Absolute Neutrophils 10.3 (*)     Absolute Lymphocytes 2.2      Absolute Monocytes 1.5 (*)     Absolute Eosinophils 0.1      Absolute Basophils 0.0      Absolute Immature Granulocytes 0.1      Absolute NRBCs 0.0     INFLUENZA A/B, RSV AND SARS-COV2 PCR - Normal    Influenza A PCR Negative      Influenza B PCR Negative      RSV PCR Negative      SARS CoV2 PCR Negative     LACTIC ACID WHOLE BLOOD WITH 1X REPEAT IN 2 HR WHEN >2 - Normal    Lactic Acid, Initial 2.0     LIPASE - Normal    Lipase 31     PROCALCITONIN - Normal    Procalcitonin 0.11     TROPONIN T, HIGH SENSITIVITY - Normal    Troponin T, High Sensitivity 8     NT PROBNP INPATIENT - Normal    N terminal Pro BNP Inpatient 55     TROPONIN T, HIGH SENSITIVITY - Normal    Troponin T, High Sensitivity 7       Imaging   US Abdomen Limited   Final Result   IMPRESSION:   1.  Diffuse hepatic steatosis.   2.  Postsurgical changes status post cholecystectomy. No visualized cholelithiasis is seen in the gallbladder fossa.            CT Chest (PE) Abdomen Pelvis w Contrast   Final Result   Abnormal   IMPRESSION:   1.  Segmental pulmonary embolism is seen in the upper lobe branch of the left pulmonary artery. No evidence of right-sided heart strain.   2.  Small right pleural effusion. Multiple bandlike consolidative opacities are seen in the lower lungs, right greater than left. A few patchy groundglass opacities are also seen in the right lower lobe. Findings likely due to subsegmental atelectasis    but superimposed pneumonia cannot be excluded. Suggest clinical correlation.   3.  5 mm hyperdense nodule is noted near the surgical bed in close proximity to the suspected remnant cystic duct. Findings could reflect a prominent vessel but residual stone cannot be excluded. Suggest further characterization with right upper quadrant    ultrasound.   4.  Mild, diffuse wall  thickening urinary bladder, likely due to sequela of outlet obstruction given prostatomegaly.         [Critical Result: New diagnosis of pulmonary embolism]      Finding was identified on 1/13/2025 10:12 AM CST.       Dr. Fountain was contacted by me on 1/13/2025 10:31 AM CST and verbalized understanding of the critical result.        EKG   ECG taken at 0740, ECG read at 0745  Sinus tachycardia  Possible left atrial enlargement  Anterior infarct, age undetermined    No significant change as compared to prior, dated 1/25/24.  Rate 129 bpm. NH interval 170 ms. QRS duration 64 ms. QT/QTc 282/413 ms. P-R-T axes 35 17 49.    Independent Interpretation   None    ED Course      Medications Administered   Medications   ondansetron (ZOFRAN) injection 4 mg (4 mg Intravenous Not Given 1/13/25 0751)   HYDROmorphone (DILAUDID) injection 0.2 mg (0.2 mg Intravenous $Given 1/13/25 0920)   rivaroxaban ANTICOAGULANT (XARELTO) tablet 15 mg (has no administration in time range)   ibuprofen (ADVIL/MOTRIN) tablet 600 mg (600 mg Oral $Given 1/13/25 0656)   ketorolac (TORADOL) injection 15 mg (15 mg Intravenous $Given 1/13/25 0751)   CT SCAN FLUSH (100 mLs Intravenous $Given 1/13/25 0953)   iopamidol (ISOVUE-370) solution 500 mL (100 mLs Intravenous $Given 1/13/25 0953)   sodium chloride 0.9% BOLUS 1,000 mL (1,000 mLs Intravenous $New Bag 1/13/25 1027)   cefTRIAXone (ROCEPHIN) 2 g vial to attach to  ml bag for ADULTS or NS 50 ml bag for PEDS (2 g Intravenous $New Bag 1/13/25 1107)   azithromycin (ZITHROMAX) 500 mg in  mL intermittent infusion (500 mg Intravenous $New Bag 1/13/25 1154)   heparin ANTICOAGULANT loading dose for  HIGH INTENSITY TREATMENT* Give BEFORE starting heparin infusion (8,000 Units Intravenous $Given 1/13/25 1117)   HYDROcodone-acetaminophen (NORCO) 5-325 MG per tablet 2 tablet (2 tablets Oral $Given 1/13/25 1240)     Procedures   None      Discussion of Management   Radiologist, Dr. Lees    ED Course   ED  Course as of 01/13/25 1341   Mon Jan 13, 2025   0763 I obtained history and examined the patient as noted above.    1029 I consulted with radiologist, Dr. Lees, regarding the results of the CT PE.   1036 I rechecked and updated the patient. He just got back yesterday from 17 hour flight. No known personal or family history of blood clots. No swelling.   1236 I rechecked and updated the patient.      Additional Documentation  None    Medical Decision Making / Diagnosis     CMS Diagnoses: None    MIPS    CT for PE was ordered because the patient had an abnormal d-dimer.    PANCHO Ospina is a 54 year old male with history of cholecystectomy presenting to the emergency department for evaluation of right flank pain radiating to the right upper quadrant, worse with deep breathing.  Patient reports similar presentation with pneumonia.  Has mild RUQ tenderness on exam.  I did consider broad differential including hepatobiliary pathology, pancreatitis, PUD, gastritis, GERD, pneumonia, PE, nephrolithiasis, pyelonephritis amongst other things.  He has low-grade fever here and tachycardic, otherwise hemodynamically stable.  He is not hypoxic or in respiratory distress.  Lab studies notable for leukocytosis, normal lactic acid, negative limited viral testing.  EKG obtained shows no acute ischemic appearing changes or signs of malignant dysrhythmia.  High-sensitivity troponin 2-hour delta troponin are normal making atypical ACS unlikely.  UA without evidence of infection or significant hematuria.  CT was obtained given elevated dimer showed segmental PE in the left upper lobe branch of the left pulmonary artery without evidence of heart strain.  Additionally, small pleural effusion on the right and opacities in the lower lung fields noted likely pneumonia given patient's fever and elevated white blood cell count.  No prior history of PE - does report recent 17 hour flight which may have provoked this VTE, otherwise  denies extremity swelling.  Hyperdense nodule noted near patient's prior surgical bed evaluated with right upper quadrant ultrasound which was otherwise unremarkable and not concerning for residual stone.  LFTs are otherwise normal.  Patient is overall well-appearing and hemodynamically stable.  He is not hypoxic or in respiratory distress.  PESI score is class 2 (low risk).  Offered hospital observation given the above findings but with shared decision making, patient elected to be discharged home with oral antibiotics and oral anticoagulants.  With shared decision making we did settle on Xarelto DOAC with first dose given here in the ER.  Recommend close follow-up with PCP for repeat chest x-ray in the coming weeks to ensure resolution of patient's pneumonia.  Xarelto starter pack and oral antibiotics ordered for community-acquired pneumonia.  Patient is comfortable with the overall plan.  Discussed signs/symptoms that should prompt his urgent return to the emergency department.  All questions were answered prior to discharge.     Disposition   The patient was discharged.     Diagnosis     ICD-10-CM    1. Community acquired pneumonia of right lower lobe of lung  J18.9       2. Single subsegmental pulmonary embolism without acute cor pulmonale (H)  I26.93       3. Hepatic steatosis  K76.0          Discharge Medications   New Prescriptions    AMOXICILLIN (AMOXIL) 500 MG CAPSULE    Take 2 capsules (1,000 mg) by mouth 3 times daily for 5 days.    AZITHROMYCIN (ZITHROMAX) 250 MG TABLET    Take 2 tablets (500 mg) by mouth daily for 1 day, THEN 1 tablet (250 mg) daily for 4 days.    RIVAROXABAN ANTICOAGULANT 15 & 20 MG TBPK STARTER THERAPY PACK    Take 15 mg by mouth 2 times daily (with meals) for 21 days, THEN 20 mg daily with food for 9 days.     Scribe Disclosure:  JARRELL CURRAN, am serving as a scribe at 7:34 AM on 1/13/2025 to document services personally performed by Wolfgang Fountain MD based on my  observations and the provider's statements to me.      Wolfgang Fountain MD  01/13/25 [id number]

## 2025-01-13 NOTE — PHARMACY-ADMISSION MEDICATION HISTORY
Pharmacy Intern Admission Medication History    Admission medication history is complete. The information provided in this note is only as accurate as the sources available at the time of the update.    Information Source(s): Patient, Family member, and CareEverywhere/SureScripts via in-person    Pertinent Information: Patient mentioned that he's in a post Zepbound trial.     Changes made to PTA medication list:  Added: None  Deleted: Fish oil, Flonase, Zofran, Senna, Zepbound   Changed: None    Allergies reviewed with patient and updates made in EHR: yes    Medication History Completed By: Mely Kim RPH 1/13/2025 11:53 AM    PTA Med List   Medication Sig Last Dose/Taking    escitalopram (LEXAPRO) 10 MG tablet Take 1 tablet (10 mg) by mouth daily 1/12/2025 Evening    lisinopril (ZESTRIL) 20 MG tablet Take 1 tablet (20 mg) by mouth daily 1/13/2025 Morning    LORazepam (ATIVAN) 0.5 MG tablet Take 1-4 tablets (0.5-2 mg) by mouth every 6 hours as needed for anxiety (air travel). Past Week    Multiple Vitamin (MULTI-VITAMIN) per tablet Take 1 tablet by mouth daily. 1/13/2025 Morning    sildenafil (VIAGRA) 50 MG tablet Take 1 tab (50 mg) daily as needed 1 hour prior to sexual activity; reduce to 25 mg if side effects occur, can increase to maximum of 100 mg if tolerating well but incomplete response. Take on empty stomach, avoid grapefruit/grapefruit juice and alcohol. Do not take with nitrate medications. Unknown    simvastatin (ZOCOR) 20 MG tablet TAKE ONE TABLET BY MOUTH EVERY NIGHT AT BEDTIME 1/12/2025 Evening

## 2025-01-13 NOTE — DISCHARGE INSTRUCTIONS
Discharge Instructions  Pneumonia    You were seen today for a chest infection or inflammation. If your provider decided this was due to a bacterial infection, you may need an antibiotic. Sometimes these are caused by a virus, and then an antibiotic will not help.     Generally, every Emergency Department visit should have a follow-up clinic visit with either a primary or a specialty clinic/provider. Please follow-up as instructed by your emergency provider today.    Return to the Emergency Department if:  Your breathing gets much worse.  You are very weak, or feel much more ill.  You develop new symptoms, such as chest pain.  You cough up blood.  You are vomiting (throwing up) enough that you cannot keep fluids or your medicine down.    What can I do to help myself?  Fill any prescriptions the provider gave you and take them right away--especially antibiotics. Be sure to finish the whole antibiotic prescription.  You may be given a prescription for an inhaler, which can help loosen tight air passages.  Use this as needed, but not more often than directed. Inhalers work much better when used with a spacer.   You may be given a prescription for a steroid to reduce inflammation. Used long-term, these can have side effects, but for short-term use they are safe. You may notice restlessness or increased appetite.      You may use non-prescription cough or cold medicines. Cough medicines may help, but don t make the cough go away completely.   Avoid smoke, because this can make your symptoms worse. If you smoke, this may be a good time to quit! Consider using nicotine lozenges, gum, or patches to reduce cravings.   If you have a fever, Tylenol  (acetaminophen), Motrin  (ibuprofen), or Advil  (ibuprofen) may help bring fever down and may help you feel more comfortable. Be sure to read and follow the package directions, and ask your provider if you have questions.  Be sure to get your flu shot each year.  For certain ages,  the pneumonia shot can help prevent pneumonia.  If you were given a prescription for medicine here today, be sure to read all of the information (including the package insert) that comes with your prescription.  This will include important information about the medicine, its side effects, and any warnings that you need to know about.  The pharmacist who fills the prescription can provide more information and answer questions you may have about the medicine.  If you have questions or concerns that the pharmacist cannot address, please call or return to the Emergency Department.     Remember that you can always come back to the Emergency Department if you are not able to see your regular provider in the amount of time listed above, if you get any new symptoms, or if there is anything that worries you.        Opioid Medication Information    You have been given a prescription for an opioid (narcotic) pain medicine and/or have received a pain medicine while here in the Emergency Department. These medicines can make you drowsy or impaired. You must not drive, operate dangerous equipment, or engage in any other dangerous activities while taking these medications. If you drive while taking these medications, you could be arrested for driving under the influence (DUI). Do not drink any alcohol while you are taking these medications.     Opioid pain medications can cause addiction. If you have a history of chemical dependency of any type, you are at a higher risk of becoming addicted to pain medications.  Only take these prescribed medications to treat your pain when all other options have been tried. Take it for as short a time and as few doses as possible. Store your pain pills in a secure place, as they are frequently stolen and provide a dangerous opportunity for children or visitors in your house to start abusing these powerful medications. We will not replace any lost or stolen medicine.    If you do not finish your  medication, it is a good idea to get rid of it but please do not flush it down the toilet. Please dispose of the remaining medication at a local pharmacy or law enforcement facility. The [place] [employer] has additional information on medication disposal: https://www.pca.[email or web address].[place]/living-green/managing-unwanted-medications.      Many prescription pain medications contain Tylenol  (acetaminophen), including Vicodin , Tylenol #3 , Norco , Lortab , and Percocet .  You should not take any extra pills of Tylenol  if you are using these prescription medications or you can get very sick.  Do not ever take more than 3000 mg of acetaminophen in any 24 hour period.    All opioids tend to cause constipation. Drink plenty of water and eat foods that have a lot of fiber, such as fruits, vegetables, prune juice, apple juice and high fiber cereal.  Take a laxative if you don t move your bowels at least every other day. Miralax , Milk of Magnesia, Colace , or Senna  can be used to keep you regular.

## 2025-01-16 ENCOUNTER — TELEPHONE (OUTPATIENT)
Dept: FAMILY MEDICINE | Facility: CLINIC | Age: 55
End: 2025-01-16

## 2025-01-16 ENCOUNTER — OFFICE VISIT (OUTPATIENT)
Dept: FAMILY MEDICINE | Facility: CLINIC | Age: 55
End: 2025-01-16
Payer: COMMERCIAL

## 2025-01-16 VITALS
SYSTOLIC BLOOD PRESSURE: 139 MMHG | WEIGHT: 220 LBS | TEMPERATURE: 97.8 F | HEIGHT: 68 IN | RESPIRATION RATE: 16 BRPM | HEART RATE: 97 BPM | DIASTOLIC BLOOD PRESSURE: 83 MMHG | BODY MASS INDEX: 33.34 KG/M2 | OXYGEN SATURATION: 100 %

## 2025-01-16 DIAGNOSIS — K40.20 NON-RECURRENT BILATERAL INGUINAL HERNIA WITHOUT OBSTRUCTION OR GANGRENE: ICD-10-CM

## 2025-01-16 DIAGNOSIS — N40.0 PROSTATE ENLARGEMENT: ICD-10-CM

## 2025-01-16 DIAGNOSIS — N52.9 ERECTILE DYSFUNCTION, UNSPECIFIED ERECTILE DYSFUNCTION TYPE: ICD-10-CM

## 2025-01-16 DIAGNOSIS — E66.811 CLASS 1 DRUG-INDUCED OBESITY WITH SERIOUS COMORBIDITY AND BODY MASS INDEX (BMI) OF 33.0 TO 33.9 IN ADULT: ICD-10-CM

## 2025-01-16 DIAGNOSIS — G47.33 OSA ON CPAP: ICD-10-CM

## 2025-01-16 DIAGNOSIS — J18.9 COMMUNITY ACQUIRED PNEUMONIA OF RIGHT LOWER LOBE OF LUNG: Primary | ICD-10-CM

## 2025-01-16 DIAGNOSIS — E66.1 CLASS 1 DRUG-INDUCED OBESITY WITH SERIOUS COMORBIDITY AND BODY MASS INDEX (BMI) OF 33.0 TO 33.9 IN ADULT: ICD-10-CM

## 2025-01-16 DIAGNOSIS — K76.0 HEPATIC STEATOSIS: ICD-10-CM

## 2025-01-16 DIAGNOSIS — I26.93 SINGLE SUBSEGMENTAL PULMONARY EMBOLISM WITHOUT ACUTE COR PULMONALE (H): ICD-10-CM

## 2025-01-16 DIAGNOSIS — E66.01 MORBID OBESITY (H): ICD-10-CM

## 2025-01-16 DIAGNOSIS — I47.29 PAROXYSMAL VENTRICULAR TACHYCARDIA (H): ICD-10-CM

## 2025-01-16 LAB
ALBUMIN UR-MCNC: 30 MG/DL
APPEARANCE UR: CLEAR
BACTERIA #/AREA URNS HPF: ABNORMAL /HPF
BASOPHILS # BLD AUTO: 0 10E3/UL (ref 0–0.2)
BASOPHILS NFR BLD AUTO: 0 %
BILIRUB UR QL STRIP: NEGATIVE
CAOX CRY #/AREA URNS HPF: ABNORMAL /HPF
COLOR UR AUTO: YELLOW
EOSINOPHIL # BLD AUTO: 0.1 10E3/UL (ref 0–0.7)
EOSINOPHIL NFR BLD AUTO: 1 %
ERYTHROCYTE [DISTWIDTH] IN BLOOD BY AUTOMATED COUNT: 12.1 % (ref 10–15)
GLUCOSE UR STRIP-MCNC: NEGATIVE MG/DL
GRAN CASTS #/AREA URNS LPF: ABNORMAL /LPF
HCT VFR BLD AUTO: 44.7 % (ref 40–53)
HGB BLD-MCNC: 14.9 G/DL (ref 13.3–17.7)
HGB UR QL STRIP: NEGATIVE
HYALINE CASTS #/AREA URNS LPF: ABNORMAL /LPF
IMM GRANULOCYTES # BLD: 0.1 10E3/UL
IMM GRANULOCYTES NFR BLD: 1 %
KETONES UR STRIP-MCNC: 15 MG/DL
LEUKOCYTE ESTERASE UR QL STRIP: NEGATIVE
LYMPHOCYTES # BLD AUTO: 1.3 10E3/UL (ref 0.8–5.3)
LYMPHOCYTES NFR BLD AUTO: 15 %
MCH RBC QN AUTO: 29.4 PG (ref 26.5–33)
MCHC RBC AUTO-ENTMCNC: 33.3 G/DL (ref 31.5–36.5)
MCV RBC AUTO: 88 FL (ref 78–100)
MONOCYTES # BLD AUTO: 0.6 10E3/UL (ref 0–1.3)
MONOCYTES NFR BLD AUTO: 7 %
MUCOUS THREADS #/AREA URNS LPF: PRESENT /LPF
NEUTROPHILS # BLD AUTO: 6.5 10E3/UL (ref 1.6–8.3)
NEUTROPHILS NFR BLD AUTO: 76 %
NITRATE UR QL: NEGATIVE
PH UR STRIP: 5.5 [PH] (ref 5–7)
PLATELET # BLD AUTO: 281 10E3/UL (ref 150–450)
RBC # BLD AUTO: 5.07 10E6/UL (ref 4.4–5.9)
RBC #/AREA URNS AUTO: ABNORMAL /HPF
SP GR UR STRIP: >=1.03 (ref 1–1.03)
SQUAMOUS #/AREA URNS AUTO: ABNORMAL /LPF
UROBILINOGEN UR STRIP-ACNC: 0.2 E.U./DL
WBC # BLD AUTO: 8.6 10E3/UL (ref 4–11)
WBC #/AREA URNS AUTO: ABNORMAL /HPF

## 2025-01-16 RX ORDER — TIRZEPATIDE 2.5 MG/.5ML
2.5 INJECTION, SOLUTION SUBCUTANEOUS
Qty: 2 ML | Refills: 0 | Status: SHIPPED | OUTPATIENT
Start: 2025-01-16 | End: 2025-02-07

## 2025-01-16 RX ORDER — TIRZEPATIDE 5 MG/.5ML
5 INJECTION, SOLUTION SUBCUTANEOUS
Qty: 2 ML | Refills: 0 | Status: SHIPPED | OUTPATIENT
Start: 2025-02-14 | End: 2025-03-08

## 2025-01-16 NOTE — TELEPHONE ENCOUNTER
Routing to Rebekah Gage PA-C to review and advise. If patient is agreeable to paying out of pocket, are you willing to send Wegovy to compounding pharmacy? We are unable to compound Zepbound.     See routing comment from PA team.         Sanaz CONDE RN  [facility]

## 2025-01-16 NOTE — TELEPHONE ENCOUNTER
Please call patient and let him know that the Zepbound (and all weight loss injectables are an exclusion in his plan). This means we cannot even send an appeal letter.  He could try calling insurance to find out since he has sleep apnea if they would consider coverage for that.  We could also try either the Direct Pema vials (he could look into this it is a mail order that come directly from the  but I spoke with [facility] and they said it was still quite pricey 400-500 dollars a month).  We could also try compounding but it would have to the semaglutide (Wegovy) as we can no longer compound the tirzepatide.

## 2025-01-16 NOTE — TELEPHONE ENCOUNTER
Called patient and advised of below.  He thanks us and states he is not surprised.  Advised to call back if wants to go with one of the other options.  Patient agrees with plan.  Brittanie Leung RN

## 2025-01-16 NOTE — TELEPHONE ENCOUNTER
Retail Pharmacy Prior Authorization Team   Phone: [phone]    INSURANCE CLOSED OUT REQUEST DUE TO DRUG NOT COVERED - PLAN EXCLUSION

## 2025-01-16 NOTE — PROGRESS NOTES
Assessment & Plan     Community acquired pneumonia of right lower lobe of lung  Complete antibiotics as prescribed.   Recheck x-ray chest in 1 month.  - XR Chest 2 Views; Future      Single subsegmental pulmonary embolism without acute cor pulmonale (H)  First blood clot for patient and none noted in the family.  Referral to hematology placed.  Continue rivaroxaban for minimum of 3 months, likely 6 months. I will defer to hematology team.  - Adult Oncology/Hematology  Referral; Future  - rivaroxaban ANTICOAGULANT (XARELTO) 20 MG TABS tablet; Take 1 tablet (20 mg) by mouth daily (with dinner).    Hepatic steatosis  Noted on imaging. He does work out and is active. He had good results with both Wegovy and Zepbound but is unable to afford them out of pocket (insurance no longer covering them).  We will attempt to send Zepbound today since it is now approved for treatment of sleep apnea. Could consider compounded semaglutide or direct from [facility] Zepbound vials.  Referral to nutrition placed for patient.  - Adult Nutrition  Referral; Future  - Comprehensive metabolic panel (BMP + Alb, Alk Phos, ALT, AST, Total. Bili, TP); Future  - CBC with platelets and differential; Future  - ZEPBOUND 2.5 MG/0.5ML prefilled pen; Inject 0.5 mLs (2.5 mg) subcutaneously every 7 days for 4 doses.  - ZEPBOUND 5 MG/0.5ML prefilled pen; Inject 0.5 mLs (5 mg) subcutaneously every 7 days for 4 doses.  - Comprehensive metabolic panel (BMP + Alb, Alk Phos, ALT, AST, Total. Bili, TP)  - CBC with platelets and differential    Morbid obesity (H)/Class 1 drug-induced obesity with serious comorbidity and body mass index (BMI) of 33.0 to 33.9 in adult  He does work out and is active. He had good results with both Wegovy and Zepbound but is unable to afford them out of pocket (insurance no longer covering them).  We will attempt to send Zepbound today since it is now approved for treatment of sleep apnea. Could consider compounded  semaglutide or direct from [facility] Zepbound vials.  Referral to nutrition placed for patient.  - Lipid panel reflex to direct LDL Non-fasting; Future  - Adult Nutrition  Referral; Future  - Comprehensive metabolic panel (BMP + Alb, Alk Phos, ALT, AST, Total. Bili, TP); Future  - CBC with platelets and differential; Future  - ZEPBOUND 2.5 MG/0.5ML prefilled pen; Inject 0.5 mLs (2.5 mg) subcutaneously every 7 days for 4 doses.  - ZEPBOUND 5 MG/0.5ML prefilled pen; Inject 0.5 mLs (5 mg) subcutaneously every 7 days for 4 doses.  - Lipid panel reflex to direct LDL Non-fasting  - Comprehensive metabolic panel (BMP + Alb, Alk Phos, ALT, AST, Total. Bili, TP)  - CBC with platelets and differential    Prostate enlargement  Recheck UA today. He denies any symptoms of weakened stream or urinary frequency. Prostate enlargement was noted on CT in the ER.  - UA Macroscopic with reflex to Microscopic and Culture - Lab Collect; Future  - UA Macroscopic with reflex to Microscopic and Culture - Lab Collect  - UA Microscopic with Reflex to Culture  - UA Macroscopic with reflex to Microscopic and Culture - Lab Collect; Future    Non-recurrent bilateral inguinal hernia without obstruction or gangrene  Noted on CT in the ER. Discussed possibility of general surgery referral. He will discuss with his wife. He is not feeling these and they are small. He lifts a lot and this along with his weight places him a greater risk for progression of the hernia.    DANIELLE on CPAP  Zepbound has now been approved for sleep apnea. Sent to pharmacy today to see if covered under this diagnosis.  - ZEPBOUND 2.5 MG/0.5ML prefilled pen; Inject 0.5 mLs (2.5 mg) subcutaneously every 7 days for 4 doses.  - ZEPBOUND 5 MG/0.5ML prefilled pen; Inject 0.5 mLs (5 mg) subcutaneously every 7 days for 4 doses.    Erectile dysfunction, unspecified erectile dysfunction type  Ordered by PCP  - Testosterone, total    Paroxysmal ventricular tachycardia (H)   History of  "this in the past but status post ablation in 2021. Has been discharged from cardiology care as he has had no issues.    MED REC REQUIRED  Post Medication Reconciliation Status:  Discharge medications reconciled and changed, see notes/orders  BMI  Estimated body mass index is 33.45 kg/m  as calculated from the following:    Height as of this encounter: 1.727 m (5' 8\").    Weight as of this encounter: 99.8 kg (220 lb).   Weight management plan: Patient is already working out, interested in dietary recommendations and nutrition referral.    The longitudinal plan of care for the diagnosis(es)/condition(s) as documented were addressed during this visit. Due to the added complexity in care, I will continue to support Neptali in the subsequent management and with ongoing continuity of care.    Review of external notes as documented elsewhere in note  Review of the result(s) of each unique test - labs and imaging from the ER reviewed  Ordering of each unique test  Prescription drug management  50 minutes spent by me on the date of the encounter doing chart review, history and exam, documentation and further activities per the note    Subjective   Neptali is a 54 year old, presenting for the following health issues:  ER F/U ([facility] 1/13/25)      1/16/2025    11:10 AM   Additional Questions   Roomed by Ruby   Accompanied by Self     HPI     ED/UC Followup:    Facility:  [facility] ED  Date of visit: 1/13/2025  Reason for visit: Pulmonary embolism/back pain  Current Status: Improving    Patient is a 54-year-old male who presents today for follow-up ER.  Patient seen and evaluated in the ER on 1/13/2025 due to fever, chest and upper back pain.  Patient had workup including influenza/COVID/RSV which were all negative.  EKG showed no signs of ischemia and troponin x 2 was normal.  BNP was normal.  D-dimer was elevated and CT chest abdomen pelvis showed a pulmonary embolus as well as signs of pneumonia.  There was " "also a nodule noted in the right upper abdomen that could have been a residual stone.  Ultrasound of the abdomen was recommended for further evaluation.  This showed no residual gallstones, postsurgical changes of a cholecystectomy and diffuse haptic steatosis.  Patient was started on antibiotics as well as rivaroxaban for the pneumonia and blood clot respectively.  He was also given a small amount of hydrocodone acetaminophen for the pain.  Patient did have a recent long flight that could have precipitated the blood clot along with the pneumonia as well.          Objective    /83 (BP Location: Right arm, Patient Position: Sitting, Cuff Size: Adult Large)   Pulse 97   Temp 97.8  F (36.6  C) (Oral)   Resp 16   Ht 1.727 m (5' 8\")   Wt 99.8 kg (220 lb)   SpO2 100%   BMI 33.45 kg/m    Body mass index is 33.45 kg/m .  Physical Exam   GENERAL: No acute distress  HEENT: Normocephalic  CARDIAC: Regular rate and rhythm. No murmurs.  PULMONARY: Crackles noted right lower and middle lobes. Otherwise lungs are clear to auscultation.  NEURO: Alert and non-focal      Results for orders placed or performed in visit on 01/16/25 (from the past 24 hours)   CBC with platelets and differential    Narrative    The following orders were created for panel order CBC with platelets and differential.  Procedure                               Abnormality         Status                     ---------                               -----------         ------                     CBC with platelets and d...[[id number]]                      Final result                 Please view results for these tests on the individual orders.   UA Macroscopic with reflex to Microscopic and Culture - Lab Collect    Specimen: Urine, Clean Catch   Result Value Ref Range    Color Urine Yellow Colorless, Straw, Light Yellow, Yellow    Appearance Urine Clear Clear    Glucose Urine Negative Negative mg/dL    Bilirubin Urine Negative Negative    Ketones Urine " 15 (A) Negative mg/dL    Specific Gravity Urine >=1.030 1.003 - 1.035    Blood Urine Negative Negative    pH Urine 5.5 5.0 - 7.0    Protein Albumin Urine 30 (A) Negative mg/dL    Urobilinogen Urine 0.2 0.2, 1.0 E.U./dL    Nitrite Urine Negative Negative    Leukocyte Esterase Urine Negative Negative   CBC with platelets and differential   Result Value Ref Range    WBC Count 8.6 4.0 - 11.0 10e3/uL    RBC Count 5.07 4.40 - 5.90 10e6/uL    Hemoglobin 14.9 13.3 - 17.7 g/dL    Hematocrit 44.7 40.0 - 53.0 %    MCV 88 78 - 100 fL    MCH 29.4 26.5 - 33.0 pg    MCHC 33.3 31.5 - 36.5 g/dL    RDW 12.1 10.0 - 15.0 %    Platelet Count 281 150 - 450 10e3/uL    % Neutrophils 76 %    % Lymphocytes 15 %    % Monocytes 7 %    % Eosinophils 1 %    % Basophils 0 %    % Immature Granulocytes 1 %    Absolute Neutrophils 6.5 1.6 - 8.3 10e3/uL    Absolute Lymphocytes 1.3 0.8 - 5.3 10e3/uL    Absolute Monocytes 0.6 0.0 - 1.3 10e3/uL    Absolute Eosinophils 0.1 0.0 - 0.7 10e3/uL    Absolute Basophils 0.0 0.0 - 0.2 10e3/uL    Absolute Immature Granulocytes 0.1 <=0.4 10e3/uL   UA Microscopic with Reflex to Culture   Result Value Ref Range    Bacteria Urine Moderate (A) None Seen /HPF    RBC Urine 2-5 (A) 0-2 /HPF /HPF    WBC Urine 0-5 0-5 /HPF /HPF    Squamous Epithelials Urine Few (A) None Seen /LPF    Mucus Urine Present (A) None Seen /LPF    Calcium Oxalate Crystals Urine Few (A) None Seen /HPF    Hyaline Casts Urine 0-2 (A) None Seen /LPF    Granular Casts Urine 0-2 (A) None Seen /LPF    Narrative    Urine Culture not indicated           Signed Electronically by: Rebekah Gage PA-C

## 2025-01-20 DIAGNOSIS — I10 ESSENTIAL HYPERTENSION: ICD-10-CM

## 2025-01-21 LAB — TESTOST SERPL-MCNC: 71 NG/DL (ref 240–950)

## 2025-01-21 RX ORDER — LISINOPRIL 20 MG/1
20 TABLET ORAL DAILY
Qty: 90 TABLET | Refills: 3 | Status: SHIPPED | OUTPATIENT
Start: 2025-01-21

## 2025-01-23 DIAGNOSIS — R79.89 LOW SERUM TESTOSTERONE LEVEL: Primary | ICD-10-CM

## 2025-02-12 ENCOUNTER — DOCUMENTATION ONLY (OUTPATIENT)
Dept: ANTICOAGULATION | Facility: CLINIC | Age: 55
End: 2025-02-12
Payer: COMMERCIAL

## 2025-02-12 NOTE — PROGRESS NOTES
Anticoagulant Therapeutic Duplication    Duplicate orders identified: same medication but different dose, form, frequency or route    Duplicate orders appropriate-has starter pack order and ongoing therapy order     Active anticoagulant: rivaroxaban (Xarelto)    Plan made per [facility] anticoagulation protocol.    Yin Thornton RN  2/12/2025

## 2025-02-25 ENCOUNTER — TRANSFERRED RECORDS (OUTPATIENT)
Dept: FAMILY MEDICINE | Facility: CLINIC | Age: 55
End: 2025-02-25
Payer: COMMERCIAL

## 2025-03-10 DIAGNOSIS — F41.9 ANXIETY: ICD-10-CM

## 2025-03-10 DIAGNOSIS — E78.2 MIXED HYPERLIPIDEMIA: ICD-10-CM

## 2025-03-10 RX ORDER — SIMVASTATIN 20 MG
TABLET ORAL
Qty: 90 TABLET | Refills: 0 | Status: SHIPPED | OUTPATIENT
Start: 2025-03-10

## 2025-03-11 RX ORDER — ESCITALOPRAM OXALATE 10 MG/1
10 TABLET ORAL DAILY
Qty: 90 TABLET | Refills: 0 | Status: SHIPPED | OUTPATIENT
Start: 2025-03-11

## 2025-03-12 ENCOUNTER — OFFICE VISIT (OUTPATIENT)
Dept: DERMATOLOGY | Facility: CLINIC | Age: 55
End: 2025-03-12
Attending: NURSE PRACTITIONER
Payer: COMMERCIAL

## 2025-03-12 DIAGNOSIS — L82.1 SEBORRHEIC KERATOSES: ICD-10-CM

## 2025-03-12 DIAGNOSIS — D18.01 CHERRY ANGIOMA: ICD-10-CM

## 2025-03-12 DIAGNOSIS — Z12.83 ENCOUNTER FOR SCREENING FOR MALIGNANT NEOPLASM OF SKIN: ICD-10-CM

## 2025-03-12 DIAGNOSIS — D22.9 MULTIPLE BENIGN NEVI: Primary | ICD-10-CM

## 2025-03-12 DIAGNOSIS — L81.4 LENTIGINES: ICD-10-CM

## 2025-03-12 NOTE — PROGRESS NOTES
[facility] Dermatology Note  Encounter Date: Mar 12, 2025  Office Visit     Reviewed patients past medical history and pertinent chart review prior to patients visit today.     Dermatology Problem List:  1. DN, mild atypia, right lower back - s/p shave biopsy 9/23/2021   2.  Benign intradermal nevus on the mid chest biopsied 2/20/2023  ____________________________________________    Assessment & Plan:     # Benign skin findings including: seborrheic keratoses, cherry angioma, lentigines and benign nevi.   - No further intervention required. Patient to report changes.   - Patient reassured of the benign nature of these lesions.    #Signs and Symptoms of non-melanoma skin cancer and ABCDEs of melanoma reviewed with patient. Patient encouraged to perform monthly self skin exams and educated on how to perform them. UV precautions reviewed with patient. Patient was asked about new or changing moles/lesions on body.     #Reviewed Sunscreen: Apply 20 minutes prior to going outdoors and reapply every two hours, when wet or sweating. We recommend using an SPF 30 or higher, and to use one that is water resistant.       Follow-up:  1-2 years for follow up full body skin exam, prn for new or changing lesions or new concerns    Ruth Velarde CNP  Dermatology     ____________________________________________    CC: Skin Check (1 year FBSC/Concerns None)    HPI:  Mr. Neptali Ospina is a(n) 54 year old male who presents today as a return patient for a full body skin cancer screening. Patient has no specific concerns today.     Patient is otherwise feeling well, without additional skin concerns.     Physical Exam:  Vitals: There were no vitals taken for this visit.  SKIN: Total skin excluding the genitalia areas was performed. The exam included the head/face, neck, both arms, chest, back, abdomen, both legs, digits, mons pubis, buttock and nails.   -right mid back, 6 x 5 mm irregular shaped macule with regular  reticulated pattern on dermoscopy  -right lower back, Well healed scar without signs of recurrent pigmentation.   -several 1-2mm red dome shaped symmetric papules scattered on the trunk  -multiple tan/brown flat round macules and raised papules scattered throughout trunk, extremities and head. No worrisome features for malignancy noted on examination.  -scattered tan, homogenous macules scattered on sun exposed areas of trunk, extremities and face.   -scattered waxy, stuck on tan/brown papules and patches on the trunk     - No other lesions of concern on areas examined.     Medications:  Current Outpatient Medications   Medication Sig Dispense Refill    escitalopram (LEXAPRO) 10 MG tablet Take 1 tablet (10 mg) by mouth daily. 90 tablet 0    HYDROcodone-acetaminophen (NORCO) 5-325 MG tablet Take 1 tablet by mouth every 6 hours as needed for severe pain. 7 tablet 0    lisinopril (ZESTRIL) 20 MG tablet Take 1 tablet (20 mg) by mouth daily. 90 tablet 3    LORazepam (ATIVAN) 0.5 MG tablet Take 1-4 tablets (0.5-2 mg) by mouth every 6 hours as needed for anxiety (air travel). 12 tablet 0    Multiple Vitamin (MULTI-VITAMIN) per tablet Take 1 tablet by mouth daily. 90 tablet 3    rivaroxaban ANTICOAGULANT (XARELTO) 20 MG TABS tablet Take 1 tablet (20 mg) by mouth daily (with dinner). 30 tablet 4    sildenafil (VIAGRA) 50 MG tablet Take 1 tab (50 mg) daily as needed 1 hour prior to sexual activity; reduce to 25 mg if side effects occur, can increase to maximum of 100 mg if tolerating well but incomplete response. Take on empty stomach, avoid grapefruit/grapefruit juice and alcohol. Do not take with nitrate medications. 12 tablet 5    simvastatin (ZOCOR) 20 MG tablet TAKE ONE TABLET BY MOUTH EVERY NIGHT AT BEDTIME 90 tablet 0    Rivaroxaban ANTICOAGULANT 15 & 20 MG TBPK Starter Therapy Pack Take 15 mg by mouth 2 times daily (with meals) for 21 days, THEN 20 mg daily with food for 9 days. 51 each 0     No current  facility-administered medications for this visit.      Past Medical History:   Patient Active Problem List   Diagnosis    DANIELLE on CPAP    Vitamin D deficiency    Left thyroid nodule    Anxiety    Morbid obesity (H)    Paroxysmal ventricular tachycardia (H)    Nonalcoholic fatty liver    TSH elevation    Glaucoma suspect of both eyes     Past Medical History:   Diagnosis Date    High triglycerides     HSV-1 infection 3/13/2018    DARLING (nonalcoholic steatohepatitis)     12/10     DANIELLE on CPAP     Palpitations     Paroxysmal ventricular tachycardia (H)     Strabismus     Thyroid nodule 02/2012    biopsy thyroglossal ductal cyst.  see Endo    Vitamin D deficiency     White coat syndrome without diagnosis of hypertension 11/22/2013    Home readings of 106/65        CC No referring provider defined for this encounter. on close of this encounter.

## 2025-03-12 NOTE — PATIENT INSTRUCTIONS
Proper skin care from [facility]:    -Eliminate harsh soaps as they strip the natural oils from the skin, often resulting in dry itchy skin ( i.e. Dial, Zest, [place] Spring)  -Use mild soaps such as Cetaphil or Dove Sensitive Skin in the shower. You do not need to use soap on arms, legs, and trunk every time you shower unless visibly soiled.   -Avoid hot or cold showers.  -After showering, lightly dry off and apply moisturizing within 2-3 minutes. This will help trap moisture in the skin.   -Aggressive use of a moisturizer at least 1-2 times a day to the entire body (including -Vanicream, Cetaphil, Aquaphor or Cerave) and moisturize hands after every washing.  -We recommend using moisturizers that come in a tub that needs to be scooped out, not a pump. This has more of an oil base. It will hold moisture in your skin much better than a water base moisturizer. The above recommended are non-pore clogging.      Wear a sunscreen with at least SPF 30 on your face, ears, neck and V of the chest daily. Wear sunscreen on other areas of the body if those areas are exposed to the sun throughout the day. Sunscreens can contain physical and/or chemical blockers. Physical blockers are less likely to clog pores, these include zinc oxide and titanium dioxide. Reapply every two hour and after swimming.     Sunscreen examples: https://www.ewg.org/sunscreen/    UV radiation  UVA radiation remains constant throughout the day and throughout the year. It is a longer wavelength than UVB and therefore penetrates deeper into the skin leading to immediate and delayed tanning, photoaging, and skin cancer. 70-80% of UVA and UVB radiation occurs between the hours of 10am-2pm.  UVB radiation  UVB radiation causes the most harmful effects and is more significant during the summer months. However, snow and ice can reflect UVB radiation leading to skin damage during the winter months as well. UVB radiation is responsible for tanning,  burning, inflammation, delayed erythema (pinkness), pigmentation (brown spots), and skin cancer.     I recommend self monthly full body exams and yearly full body exams with a dermatology provider. If you develop a new or changing lesion please follow up for examination. Most skin cancers are pink and scaly or pink and pearly. However, we do see blue/brown/black skin cancers.  Consider the ABCDEs of melanoma when giving yourself your monthly full body exam ( don't forget the groin, buttocks, feet, toes, etc). A-asymmetry, B-borders, C-color, D-diameter, E-elevation or evolving. If you see any of these changes please follow up in clinic. If you cannot see your back I recommend purchasing a hand held mirror to use with a larger wall mirror.       Checking for Skin Cancer  You can find cancer early by checking your skin each month. There are 3 kinds of skin cancer. They are melanoma, basal cell carcinoma, and squamous cell carcinoma. Doing monthly skin checks is the best way to find new marks or skin changes. Follow the instructions below for checking your skin.   The ABCDEs of checking moles for melanoma   Check your moles or growths for signs of melanoma using ABCDE:   Asymmetry: the sides of the mole or growth don t match  Border: the edges are ragged, notched, or blurred  Color: the color within the mole or growth varies  Diameter: the mole or growth is larger than 6 mm (size of a pencil eraser)  Evolving: the size, shape, or color of the mole or growth is changing (evolving is not shown in the images below)    Checking for other types of skin cancer  Basal cell carcinoma or squamous cell carcinoma have symptoms such as:     A spot or mole that looks different from all other marks on your skin  Changes in how an area feels, such as itching, tenderness, or pain  Changes in the skin's surface, such as oozing, bleeding, or scaliness  A sore that does not heal  New swelling or redness beyond the border of a  mole    Who s at risk?  Anyone can get skin cancer. But you are at greater risk if you have:   Fair skin, light-colored hair, or light-colored eyes  Many moles or abnormal moles on your skin  A history of sunburns from sunlight or tanning beds  A family history of skin cancer  A history of exposure to radiation or chemicals  A weakened immune system  If you have had skin cancer in the past, you are at risk for recurring skin cancer.   How to check your skin  Do your monthly skin checkups in front of a full-length mirror. Check all parts of your body, including your:   Head (ears, face, neck, and scalp)  Torso (front, back, and sides)  Arms (tops, undersides, upper, and lower armpits)  Hands (palms, backs, and fingers, including under the nails)  Buttocks and genitals  Legs (front, back, and sides)  Feet (tops, soles, toes, including under the nails, and between toes)  If you have a lot of moles, take digital photos of them each month. Make sure to take photos both up close and from a distance. These can help you see if any moles change over time.   Most skin changes are not cancer. But if you see any changes in your skin, call your doctor right away. Only he or she can diagnose a problem. If you have skin cancer, seeing your doctor can be the first step toward getting the treatment that could save your life.   [employer] last reviewed this educational content on 4/1/2019 2000-2020 [employer]. [address] [place] [address], [place] [address]. All rights reserved. This information is not intended as a substitute for professional medical care. Always follow your healthcare professional's instructions.       When should I call my doctor?  If you are worsening or not improving, please, contact us or seek urgent care as noted below.     Who should I call with questions (adults)?    [facility] [phone]  For urgent needs outside of business hours call the [facility] at  [phone] and ask for the dermatology resident on call to be paged  If this is a medical emergency and you are unable to reach an ER, Call [phone]      If you need a prescription refill, please contact your pharmacy. Refills are approved or denied by our Physicians during normal business hours, Monday through Friday.  Per office policy, refills will not be granted if you have not been seen within the past year (or sooner depending on the condition).

## 2025-03-12 NOTE — LETTER
3/12/2025      Neptali Ospina  [id number] [facility] [id number]      Dear Colleague,    Thank you for referring your patient, Neptali Ospina, to the [facility]. Please see a copy of my visit note below.    [facility] Dermatology Note  Encounter Date: Mar 12, 2025  Office Visit     Reviewed patients past medical history and pertinent chart review prior to patients visit today.     Dermatology Problem List:  1. DN, mild atypia, right lower back - s/p shave biopsy 9/23/2021   2.  Benign intradermal nevus on the mid chest biopsied 2/20/2023  ____________________________________________    Assessment & Plan:     # Benign skin findings including: seborrheic keratoses, cherry angioma, lentigines and benign nevi.   - No further intervention required. Patient to report changes.   - Patient reassured of the benign nature of these lesions.    #Signs and Symptoms of non-melanoma skin cancer and ABCDEs of melanoma reviewed with patient. Patient encouraged to perform monthly self skin exams and educated on how to perform them. UV precautions reviewed with patient. Patient was asked about new or changing moles/lesions on body.     #Reviewed Sunscreen: Apply 20 minutes prior to going outdoors and reapply every two hours, when wet or sweating. We recommend using an SPF 30 or higher, and to use one that is water resistant.       Follow-up:  1-2 years for follow up full body skin exam, prn for new or changing lesions or new concerns    Ruth Velarde CNP  Dermatology     ____________________________________________    CC: Skin Check (1 year FBSC/Concerns None)    HPI:  Mr. Neptali Ospina is a(n) 54 year old male who presents today as a return patient for a full body skin cancer screening. Patient has no specific concerns today.     Patient is otherwise feeling well, without additional skin concerns.     Physical Exam:  Vitals: There were no vitals taken for this visit.  SKIN:  Total skin excluding the genitalia areas was performed. The exam included the head/face, neck, both arms, chest, back, abdomen, both legs, digits, mons pubis, buttock and nails.   -right mid back, 6 x 5 mm irregular shaped macule with regular reticulated pattern on dermoscopy  -right lower back, Well healed scar without signs of recurrent pigmentation.   -several 1-2mm red dome shaped symmetric papules scattered on the trunk  -multiple tan/brown flat round macules and raised papules scattered throughout trunk, extremities and head. No worrisome features for malignancy noted on examination.  -scattered tan, homogenous macules scattered on sun exposed areas of trunk, extremities and face.   -scattered waxy, stuck on tan/brown papules and patches on the trunk     - No other lesions of concern on areas examined.     Medications:  Current Outpatient Medications   Medication Sig Dispense Refill     escitalopram (LEXAPRO) 10 MG tablet Take 1 tablet (10 mg) by mouth daily. 90 tablet 0     HYDROcodone-acetaminophen (NORCO) 5-325 MG tablet Take 1 tablet by mouth every 6 hours as needed for severe pain. 7 tablet 0     lisinopril (ZESTRIL) 20 MG tablet Take 1 tablet (20 mg) by mouth daily. 90 tablet 3     LORazepam (ATIVAN) 0.5 MG tablet Take 1-4 tablets (0.5-2 mg) by mouth every 6 hours as needed for anxiety (air travel). 12 tablet 0     Multiple Vitamin (MULTI-VITAMIN) per tablet Take 1 tablet by mouth daily. 90 tablet 3     rivaroxaban ANTICOAGULANT (XARELTO) 20 MG TABS tablet Take 1 tablet (20 mg) by mouth daily (with dinner). 30 tablet 4     sildenafil (VIAGRA) 50 MG tablet Take 1 tab (50 mg) daily as needed 1 hour prior to sexual activity; reduce to 25 mg if side effects occur, can increase to maximum of 100 mg if tolerating well but incomplete response. Take on empty stomach, avoid grapefruit/grapefruit juice and alcohol. Do not take with nitrate medications. 12 tablet 5     simvastatin (ZOCOR) 20 MG tablet TAKE ONE  TABLET BY MOUTH EVERY NIGHT AT BEDTIME 90 tablet 0     Rivaroxaban ANTICOAGULANT 15 & 20 MG TBPK Starter Therapy Pack Take 15 mg by mouth 2 times daily (with meals) for 21 days, THEN 20 mg daily with food for 9 days. 51 each 0     No current facility-administered medications for this visit.      Past Medical History:   Patient Active Problem List   Diagnosis     DANIELLE on CPAP     Vitamin D deficiency     Left thyroid nodule     Anxiety     Morbid obesity (H)     Paroxysmal ventricular tachycardia (H)     Nonalcoholic fatty liver     TSH elevation     Glaucoma suspect of both eyes     Past Medical History:   Diagnosis Date     High triglycerides      HSV-1 infection 3/13/2018     DARLING (nonalcoholic steatohepatitis)     12/10      DANIELLE on CPAP      Palpitations      Paroxysmal ventricular tachycardia (H)      Strabismus      Thyroid nodule 02/2012    biopsy thyroglossal ductal cyst.  see Endo     Vitamin D deficiency      White coat syndrome without diagnosis of hypertension 11/22/2013    Home readings of 106/65        CC No referring provider defined for this encounter. on close of this encounter.      Again, thank you for allowing me to participate in the care of your patient.        Sincerely,        RONALD Parks CNP    Electronically signed

## 2025-03-18 ENCOUNTER — DOCUMENTATION ONLY (OUTPATIENT)
Dept: ANTICOAGULATION | Facility: CLINIC | Age: 55
End: 2025-03-18
Payer: COMMERCIAL

## 2025-03-18 NOTE — PROGRESS NOTES
Anticoagulant Therapeutic Duplication    Duplicate orders identified: same medication but different dose, form, frequency or route    The duplicate anticoagulant order(s) has been discontinued    Active anticoagulant: rivaroxaban (Xarelto)    Plan made per [facility] anticoagulation protocol.    Mora Valentine RN  3/18/2025

## 2025-03-19 NOTE — PROGRESS NOTES
[facility]  [address] [place] [id number]  Main: [phone], Fax: [phone]    Patient seen at: [facility] at [address] [place] [address]    Outpatient Visit Note:    Patient: Neptali Ospina  MRN: [id number]  : 1970  GUDELIA: 2025  Location of this writer at the time of this clinic visit was conducted: [facility].  Location of the patient at the time of this clinic visit was conducted: [facility].     Reason for visit:  Pulmonary embolism found on 2025.     HPI:  Neptali is a 54 year old male with a history of nonalcoholic steatohepatitis, obstructive sleep apnea, anxiety, obesity on Zepbound, paroxysmal ventricular tachycardia, and hyperlipidemia, who is found to have pulmonary embolism on 2025 one day after he returned from [place], referred by Rebekah Gage PA-C of [facility] for consultation.     On 1/3/2025, he and his family flew from [place] to [place] via [place]. [place] was about 4 hours flight and [place] to [place] was about 6 hours flight. During the last few days of his [place] trip, he noticed some right sided back pain that he thought is related to pneumonia as his symptoms were similar to his experience with pneumonia. He recalls that he still did all the activities with his family.     On 2025, he returned to [place] from [place] via [place], then [place] and then to [place]. Flight from [place] to [place] was 6 hours, from [place] to [place] was 2 hours and from [place] to [place] was 2 hours.     He reportedly started to have right sided chest pain that radiates to his lower right back along with some fever for about 2 days prior to his presentation to the [facility] Emergency Department on 2025 for  "evaluation. A D-Dimer was done, which was elevated at 2.98. Thus a CTA chest was done showing \"1.  Segmental pulmonary embolism is seen in the upper lobe branch of the left pulmonary artery. No evidence of right-sided heart strain. 2.  Small right pleural effusion. Multiple bandlike consolidative opacities are seen in the lower lungs, right greater than left. A few patchy groundglass opacities are also seen in the right lower lobe. Findings likely due to subsegmental atelectasis but superimposed pneumonia cannot be excluded. Suggest clinical correlation. 3.  5 mm hyperdense nodule is noted near the surgical bed in close proximity to the suspected remnant cystic duct. Findings could reflect a prominent vessel but residual stone cannot be excluded. Suggest further characterization with right upper quadrant ultrasound. 4.  Mild, diffuse wall thickening urinary bladder, likely due to sequela of outlet obstruction given prostatomegaly.\" An abdominal ultrasound was done also which showed diffuse hepatic steatosis as well as post surgical changes after cholecystectomy. Influenza, RSV, and COVID-19 tests were all negative. He was placed on rivaroxaban and was discharged home. He was also given a course of antibiotics for presumed community acquired pneumonia.     1/16/2025, he was seen by Rebekah Gage PA-C for follow up and was referred to our clinic for consultation.     Currently, he is on rivaroxaban at 20 mg PO Qday dosing. He reports that his fever and back/chest pain resolved in 4-5 days after he was placed on antibiotics and rivaroxaban. Currently he denies any chest pain or fever. He has no complaint of shortness of breath. Throughout this time, he denies noticing any lower extremity swelling or pain.     He is planning to go to [place] from 6/12/2025 to 7/1/2025 with one of his daughters.     ROS:  Denies any bleeding complications. Specifically, no frequent epistaxis. No issues with oral mucosal bleeding. Denies " any hematuria or blood in stools. Denies any shortness of breath. No chest pain. No cough. No fever.    Medications:  Current Outpatient Medications   Medication Sig Dispense Refill    escitalopram (LEXAPRO) 10 MG tablet Take 1 tablet (10 mg) by mouth daily. 90 tablet 0    HYDROcodone-acetaminophen (NORCO) 5-325 MG tablet Take 1 tablet by mouth every 6 hours as needed for severe pain. 7 tablet 0    lisinopril (ZESTRIL) 20 MG tablet Take 1 tablet (20 mg) by mouth daily. 90 tablet 3    LORazepam (ATIVAN) 0.5 MG tablet Take 1-4 tablets (0.5-2 mg) by mouth every 6 hours as needed for anxiety (air travel). 12 tablet 0    Multiple Vitamin (MULTI-VITAMIN) per tablet Take 1 tablet by mouth daily. 90 tablet 3    rivaroxaban ANTICOAGULANT (XARELTO) 20 MG TABS tablet Take 1 tablet (20 mg) by mouth daily (with dinner). 30 tablet 4    sildenafil (VIAGRA) 50 MG tablet Take 1 tab (50 mg) daily as needed 1 hour prior to sexual activity; reduce to 25 mg if side effects occur, can increase to maximum of 100 mg if tolerating well but incomplete response. Take on empty stomach, avoid grapefruit/grapefruit juice and alcohol. Do not take with nitrate medications. 12 tablet 5    simvastatin (ZOCOR) 20 MG tablet TAKE ONE TABLET BY MOUTH EVERY NIGHT AT BEDTIME 90 tablet 0     No current facility-administered medications for this visit.     Allergies:   No Known Allergies    PmHx:  Past Medical History:   Diagnosis Date    High triglycerides     HSV-1 infection 3/13/2018    DARLING (nonalcoholic steatohepatitis)     12/10     DANIELLE on CPAP     Palpitations     Paroxysmal ventricular tachycardia (H)     Strabismus     Thyroid nodule 02/2012    biopsy thyroglossal ductal cyst.  see Endo    Vitamin D deficiency     White coat syndrome without diagnosis of hypertension 11/22/2013    Home readings of 106/65        Social History:   He is  with 4 children.     Family History:  He denies any family history of DVT/PE.  He has a total of 4  "sisters, all without any history of venous thromboembolism.  Parents with no history of venous thrombosis.   He has a total of 4 children age from 23, 22, 18 and 17. All without any history of venous thromboembolism.     Objective:  Vitals: BP (!) 138/92 (BP Location: Left arm, Patient Position: Sitting)   Pulse 90   Temp 98.4  F (36.9  C) (Tympanic)   Ht 1.727 m (5' 7.99\")   Wt 103.9 kg (229 lb)   SpO2 100%   BMI 34.83 kg/m    Exam:   He has no obvious swelling or discoloration of his lower extremities.     Labs:  Component      Latest Ref Rng 1/16/2025  12:06 PM   WBC      4.0 - 11.0 10e3/uL 8.6    RBC Count      4.40 - 5.90 10e6/uL 5.07    Hemoglobin      13.3 - 17.7 g/dL 14.9    Hematocrit      40.0 - 53.0 % 44.7    MCV      78 - 100 fL 88    MCH      26.5 - 33.0 pg 29.4    MCHC      31.5 - 36.5 g/dL 33.3    RDW      10.0 - 15.0 % 12.1    Platelet Count      150 - 450 10e3/uL 281    % Neutrophils      % 76    % Lymphocytes      % 15    % Monocytes      % 7    % Eosinophils      % 1    % Basophils      % 0    % Immature Granulocytes      % 1    Absolute Neutrophils      1.6 - 8.3 10e3/uL 6.5    Absolute Lymphocytes      0.8 - 5.3 10e3/uL 1.3    Absolute Monocytes      0.0 - 1.3 10e3/uL 0.6    Absolute Eosinophils      0.0 - 0.7 10e3/uL 0.1    Absolute Basophils      0.0 - 0.2 10e3/uL 0.0    Absolute Immature Granulocytes      <=0.4 10e3/uL 0.1    Sodium      135 - 145 mmol/L 141    Potassium      3.4 - 5.3 mmol/L 4.6    Carbon Dioxide (CO2)      22 - 29 mmol/L 25    Anion Gap      7 - 15 mmol/L 14    Urea Nitrogen      6.0 - 20.0 mg/dL 15.3    Creatinine      0.67 - 1.17 mg/dL 0.87    GFR Estimate      >60 mL/min/1.73m2 >90    Calcium      8.8 - 10.4 mg/dL 9.6    Chloride      98 - 107 mmol/L 102    Glucose      70 - 99 mg/dL 100 (H)    Alkaline Phosphatase      40 - 150 U/L 62    AST      0 - 45 U/L 27    ALT      0 - 70 U/L 25    Protein Total      6.4 - 8.3 g/dL 8.0    Albumin      3.5 - 5.2 g/dL 4.2  "   Bilirubin Total      <=1.2 mg/dL 0.4    Patient Fasting? Unknown       Imaging:  Reviewed and are as described above.     Assessment:  In summary, Neptali is a 54 year old male with a history of nonalcoholic steatohepatitis, obstructive sleep apnea, anxiety, obesity on Zepbound, paroxysmal ventricular tachycardia, and hyperlipidemia, who is found to have pulmonary embolism on 1/13/2025 one day after he returned from [place], referred by Rebekah Gage PA-C of [facility] for consultation.     Neptali's pulmonary embolic event found on 1/13/2025 was likely provoked by his long distance flight to [place] on 1/3/2025. Furthermore, he did have evidence of pneumonia and a fever with imaging study found on 1/13/2025, which could be also a provoking factor leading up to his pulmonary embolic event.     Diagnosis:  Provoked pulmonary embolism found on 1/13/2025. Multifactorial from long distance flight and pneumonia.     Plan:  Today, I spent quite a bit of time to educate Neptali on DVT/PE, provoked vs unprovoked venous thromboembolic event, discuss the differences between venous and arterial thrombosis, and discuss the general approach in regard to anticoagulation therapy management and duration. I also answered all his questions to his satisfaction.     I explain to him that his pulmonary embolic event was likely a provoked event as explained above. I explain that in accordance to current American Society of Hematology (BILLY) guidelines, 3-6 months of anticoagulation therapy is recommended. As mentioned above, he is planning a trip to [facility] in June 2025 and his wife would like him to stay on anticoagulation therapy until after that trip, which I have no opposition of. Thus I will have him stay on anticoagulation therapy with rivaroxaban at 20 mg PO Qday throught 7/13/2025 and then discontinue thereafter.     We discussed prevention strategies today. I explain that he should be placed on episodic  pharmacological DVT/PE prophylaxis at times of increase risk of venous thrombosis. This can simply be done by having him take a dose of rivaroxaban at 10 mg PO Q 24 hours as needed just 30-60 minutes prior to his long distance flight or car travelling for > 4 hours. I explain that his primary care provider can provide this as needed prescription for him in the future.     We also briefly discuss about thrombophilia workup. I explain to him that I do not feel that a thrombophilia workup is entirely necessary as his pulmonary embolic event was a provoked event and he has no family history of DVT/PE. However, I am not entirely oppose to have this done either if the patient insists. After our discussion, the patient is comfortable not to pursue testing at this time.     The patient is provided with our clinic's contact information and is instructed to call if he should have any further questions or concerns.     At this time, I have no further plans to see him back on a routine basis.     Thank you for letting us to participates in this patient's care.         Tevin Valle PA-C, MPAS  Physician Assistant  [facility].     45 minutes spent by me on the date of the encounter doing chart review, history and exam, documentation and further activities per the note    Time IN: 12:57  Time OUT: 13:35

## 2025-03-25 ENCOUNTER — OFFICE VISIT (OUTPATIENT)
Dept: FAMILY MEDICINE | Facility: CLINIC | Age: 55
End: 2025-03-25
Payer: COMMERCIAL

## 2025-03-25 VITALS
OXYGEN SATURATION: 100 % | RESPIRATION RATE: 13 BRPM | WEIGHT: 229 LBS | BODY MASS INDEX: 34.71 KG/M2 | SYSTOLIC BLOOD PRESSURE: 136 MMHG | DIASTOLIC BLOOD PRESSURE: 89 MMHG | TEMPERATURE: 98.1 F | HEART RATE: 93 BPM | HEIGHT: 68 IN

## 2025-03-25 DIAGNOSIS — F41.9 ANXIETY: ICD-10-CM

## 2025-03-25 DIAGNOSIS — Z12.5 SCREENING FOR PROSTATE CANCER: ICD-10-CM

## 2025-03-25 DIAGNOSIS — E66.811 OBESITY (BMI 30.0-34.9): ICD-10-CM

## 2025-03-25 DIAGNOSIS — E78.2 MIXED HYPERLIPIDEMIA: ICD-10-CM

## 2025-03-25 DIAGNOSIS — I10 ESSENTIAL HYPERTENSION: ICD-10-CM

## 2025-03-25 DIAGNOSIS — I26.93 SINGLE SUBSEGMENTAL PULMONARY EMBOLISM WITHOUT ACUTE COR PULMONALE (H): ICD-10-CM

## 2025-03-25 DIAGNOSIS — Z13.1 SCREENING FOR DIABETES MELLITUS: ICD-10-CM

## 2025-03-25 DIAGNOSIS — R79.89 LOW SERUM TESTOSTERONE LEVEL: ICD-10-CM

## 2025-03-25 DIAGNOSIS — F40.243 ANXIETY WITH FLYING: ICD-10-CM

## 2025-03-25 DIAGNOSIS — K40.20 NON-RECURRENT BILATERAL INGUINAL HERNIA WITHOUT OBSTRUCTION OR GANGRENE: ICD-10-CM

## 2025-03-25 DIAGNOSIS — Z00.00 ROUTINE GENERAL MEDICAL EXAMINATION AT A HEALTH CARE FACILITY: Primary | ICD-10-CM

## 2025-03-25 DIAGNOSIS — K76.0 HEPATIC STEATOSIS: ICD-10-CM

## 2025-03-25 DIAGNOSIS — N52.9 ERECTILE DYSFUNCTION, UNSPECIFIED ERECTILE DYSFUNCTION TYPE: ICD-10-CM

## 2025-03-25 PROBLEM — I47.29 PAROXYSMAL VENTRICULAR TACHYCARDIA (H): Status: ACTIVE | Noted: 2021-12-09

## 2025-03-25 PROBLEM — I47.20 PAROXYSMAL VENTRICULAR TACHYCARDIA (H): Status: ACTIVE | Noted: 2021-12-09

## 2025-03-25 LAB
EST. AVERAGE GLUCOSE BLD GHB EST-MCNC: 103 MG/DL
HBA1C MFR BLD: 5.2 % (ref 0–5.6)

## 2025-03-25 PROCEDURE — 83036 HEMOGLOBIN GLYCOSYLATED A1C: CPT | Performed by: PHYSICIAN ASSISTANT

## 2025-03-25 PROCEDURE — 3075F SYST BP GE 130 - 139MM HG: CPT | Performed by: PHYSICIAN ASSISTANT

## 2025-03-25 PROCEDURE — 90471 IMMUNIZATION ADMIN: CPT | Performed by: PHYSICIAN ASSISTANT

## 2025-03-25 PROCEDURE — 3079F DIAST BP 80-89 MM HG: CPT | Performed by: PHYSICIAN ASSISTANT

## 2025-03-25 PROCEDURE — 99214 OFFICE O/P EST MOD 30 MIN: CPT | Mod: 25 | Performed by: PHYSICIAN ASSISTANT

## 2025-03-25 PROCEDURE — 99396 PREV VISIT EST AGE 40-64: CPT | Mod: 25 | Performed by: PHYSICIAN ASSISTANT

## 2025-03-25 PROCEDURE — G0103 PSA SCREENING: HCPCS | Performed by: PHYSICIAN ASSISTANT

## 2025-03-25 PROCEDURE — 1126F AMNT PAIN NOTED NONE PRSNT: CPT | Performed by: PHYSICIAN ASSISTANT

## 2025-03-25 PROCEDURE — 90715 TDAP VACCINE 7 YRS/> IM: CPT | Performed by: PHYSICIAN ASSISTANT

## 2025-03-25 PROCEDURE — G2211 COMPLEX E/M VISIT ADD ON: HCPCS | Performed by: PHYSICIAN ASSISTANT

## 2025-03-25 PROCEDURE — 36415 COLL VENOUS BLD VENIPUNCTURE: CPT | Performed by: PHYSICIAN ASSISTANT

## 2025-03-25 RX ORDER — LORAZEPAM 0.5 MG/1
.5-2 TABLET ORAL EVERY 6 HOURS PRN
Qty: 12 TABLET | Refills: 0 | Status: SHIPPED | OUTPATIENT
Start: 2025-03-25

## 2025-03-25 RX ORDER — ESCITALOPRAM OXALATE 10 MG/1
10 TABLET ORAL DAILY
Qty: 90 TABLET | Refills: 2 | Status: SHIPPED | OUTPATIENT
Start: 2025-03-25

## 2025-03-25 RX ORDER — SILDENAFIL 50 MG/1
TABLET, FILM COATED ORAL
Qty: 12 TABLET | Refills: 5 | Status: SHIPPED | OUTPATIENT
Start: 2025-03-25

## 2025-03-25 RX ORDER — SIMVASTATIN 20 MG
TABLET ORAL
Qty: 90 TABLET | Refills: 2 | Status: SHIPPED | OUTPATIENT
Start: 2025-03-25

## 2025-03-25 SDOH — HEALTH STABILITY: PHYSICAL HEALTH: ON AVERAGE, HOW MANY DAYS PER WEEK DO YOU ENGAGE IN MODERATE TO STRENUOUS EXERCISE (LIKE A BRISK WALK)?: 6 DAYS

## 2025-03-25 SDOH — HEALTH STABILITY: PHYSICAL HEALTH: ON AVERAGE, HOW MANY MINUTES DO YOU ENGAGE IN EXERCISE AT THIS LEVEL?: 90 MIN

## 2025-03-25 ASSESSMENT — SOCIAL DETERMINANTS OF HEALTH (SDOH): HOW OFTEN DO YOU GET TOGETHER WITH FRIENDS OR RELATIVES?: ONCE A WEEK

## 2025-03-25 ASSESSMENT — PAIN SCALES - GENERAL: PAINLEVEL_OUTOF10: NO PAIN (0)

## 2025-03-25 NOTE — PATIENT INSTRUCTIONS
Patient Education   Preventive Care Advice   This is general advice given by our system to help you stay healthy. However, your care team may have specific advice just for you. Please talk to your care team about your preventive care needs.  Nutrition  Eat 5 or more servings of fruits and vegetables each day.  Try wheat bread, brown rice and whole grain pasta (instead of white bread, rice, and pasta).  Get enough calcium and vitamin D. Check the label on foods and aim for 100% of the RDA (recommended daily allowance).  Lifestyle  Exercise at least 150 minutes each week  (30 minutes a day, 5 days a week).  Do muscle strengthening activities 2 days a week. These help control your weight and prevent disease.  No smoking.  Wear sunscreen to prevent skin cancer.  Have a dental exam and cleaning every 6 months.  Yearly exams  See your health care team every year to talk about:  Any changes in your health.  Any medicines your care team has prescribed.  Preventive care, family planning, and ways to prevent chronic diseases.  Shots (vaccines)   HPV shots (up to age 26), if you've never had them before.  Hepatitis B shots (up to age 59), if you've never had them before.  COVID-19 shot: Get this shot when it's due.  Flu shot: Get a flu shot every year.  Tetanus shot: Get a tetanus shot every 10 years.  Pneumococcal, hepatitis A, and RSV shots: Ask your care team if you need these based on your risk.  Shingles shot (for age 50 and up)  General health tests  Diabetes screening:  Starting at age 35, Get screened for diabetes at least every 3 years.  If you are younger than age 35, ask your care team if you should be screened for diabetes.  Cholesterol test: At age 39, start having a cholesterol test every 5 years, or more often if advised.  Bone density scan (DEXA): At age 50, ask your care team if you should have this scan for osteoporosis (brittle bones).  Hepatitis C: Get tested at least once in your life.  STIs (sexually  transmitted infections)  Before age 24: Ask your care team if you should be screened for STIs.  After age 24: Get screened for STIs if you're at risk. You are at risk for STIs (including HIV) if:  You are sexually active with more than one person.  You don't use condoms every time.  You or a partner was diagnosed with a sexually transmitted infection.  If you are at risk for HIV, ask about PrEP medicine to prevent HIV.  Get tested for HIV at least once in your life, whether you are at risk for HIV or not.  Cancer screening tests  Cervical cancer screening: If you have a cervix, begin getting regular cervical cancer screening tests starting at age 21.  Breast cancer scan (mammogram): If you've ever had breasts, begin having regular mammograms starting at age 40. This is a scan to check for breast cancer.  Colon cancer screening: It is important to start screening for colon cancer at age 45.  Have a colonoscopy test every 10 years (or more often if you're at risk) Or, ask your provider about stool tests like a FIT test every year or Cologuard test every 3 years.  To learn more about your testing options, visit:   .  For help making a decision, visit:   https://bit.ly/jz03628.  Prostate cancer screening test: If you have a prostate, ask your care team if a prostate cancer screening test (PSA) at age 55 is right for you.  Lung cancer screening: If you are a current or former smoker ages 50 to 80, ask your care team if ongoing lung cancer screenings are right for you.  For informational purposes only. Not to replace the advice of your health care provider. Copyright   2023 Thelma Apontador. All rights reserved. Clinically reviewed by the [facility]. [employer] [id number] - REV 01/24.

## 2025-03-25 NOTE — PROGRESS NOTES
Preventive Care Visit  [facility]  Padmini Thompson PA-C, Family Medicine  Mar 25, 2025      Assessment & Plan     Routine general medical examination at a health care facility    Anxiety with flying  Uses only with flying. He has upcoming flight to [place].  - LORazepam (ATIVAN) 0.5 MG tablet; Take 1-4 tablets (0.5-2 mg) by mouth every 6 hours as needed for anxiety (air travel).    Screening for diabetes mellitus  - Hemoglobin A1c; Future  - Hemoglobin A1c    Anxiety  Chronic, controlled. Continue.  - escitalopram (LEXAPRO) 10 MG tablet; Take 1 tablet (10 mg) by mouth daily.    Mixed hyperlipidemia  Tolerating medication well. Continue.  - simvastatin (ZOCOR) 20 MG tablet; TAKE ONE TABLET BY MOUTH EVERY NIGHT AT BEDTIME    Erectile dysfunction, unspecified erectile dysfunction type  - sildenafil (VIAGRA) 50 MG tablet; Take 1 tab (50 mg) daily as needed 1 hour prior to sexual activity; reduce to 25 mg if side effects occur, can increase to maximum of 100 mg if tolerating well but incomplete response. Take on empty stomach, avoid grapefruit/grapefruit juice and alcohol. Do not take with nitrate medications.    Low serum testosterone level  Repeating today, he is fasting.  - Testosterone, total    Hepatic steatosis  Noted on imaging. FIB-4 score low. Encourage healthy lifestyle.    Essential hypertension  BP always higher in clinic due to anxiety, normal at home. Continue lisinopril.    Screening for prostate cancer  - PSA, screen; Future  - PSA, screen    Non-recurrent bilateral inguinal hernia without obstruction or gangrene  Noted on CT in the ER. Discussed possibility of general surgery referral. He will discuss with his wife. He is not feeling these and they are small. He lifts a lot and this along with his weight places him a greater risk for progression of the hernia.     Single subsegmental pulmonary embolism without acute cor pulmonale (H)  First blood clot for patient and none noted in  the family.  Hematology visit scheduled 4/7/25  He does have upcoming flight to [place] in a few months - recommend he discuss this with hematology. Encourage compression socks, frequent ambulation during flight.  Continue rivaroxaban for now, will defer to hematology team for duration of anticoagulant.    Obesity (BMI 30.0-34.9)  Frustrated that insurance will not cover GLP-1 medication. He will continue to work on lifestyle.      Counseling  Appropriate preventive services were discussed with this patient.  Checklist reviewing preventive services available has been given to the patient.    The longitudinal plan of care for the diagnosis(es)/condition(s) as documented were addressed during this visit. Due to the added complexity in care, I will continue to support Neptali in the subsequent management and with ongoing continuity of care.      Subjective   Neptali is a 54 year old, presenting for the following:  Physical        3/25/2025    10:08 AM   Additional Questions   Roomed by Shraddha Fair [facility]        Pt is fasting in case of labs.    No additional concerns.    HPI    Pulmonary embolism 1/13/25  He is on xarelto  Hematology visit scheduled 4/7/25    Going to [facility] with daughter for a few weeks in June    HTN: taking lisinopril 20 mg daily. Doing well, no side effects or concerns. He checks blood pressure at home regularly and it is well controlled in 120s/70s. No chest pain, shortness of breath, swelling in the extremities, palpitations, dizziness, headaches, blurred vision. BP is usually higher in clinic due to anxiety.     History of sustained monomorphic VT, s/p ablation at [facility] (12/2021). He saw cardiology on 1/21/22 and does not need further follow-up unless concerns. He feels really good. Denies palpitations, dizziness, lightheadedness, chest pain, shortness of breath.     Hyperlipidemia Follow-Up     Are you regularly taking any medication or supplement to lower your cholesterol?   Yes- simvastatin  Are you  having muscle aches or other side effects that you think could be caused by your cholesterol lowering medication?  No    Anxiety Follow-Up  How are you doing with your anxiety since your last visit? stable  Are you having other symptoms that might be associated with anxiety? No  Have you had a significant life event? No        Are you feeling depressed? No  Do you have any concerns with your use of alcohol or other drugs? No    He feels like his anxiety is stable and would like to stay on same dose of lexparo. He does have flight anxiety and is traveling internationally soon. He would like some ativan for the flight. He has used ativan for flights in the past and it works okay for him but still has some anxiety. He does not mix with alcohol or drive while taking the medication.        11/2/2020     3:25 PM   PHQ   PHQ-9 Total Score 0   Q9: Thoughts of better off dead/self-harm past 2 weeks Not at all         11/2/2020     3:25 PM 2/12/2022    11:45 AM 1/8/2023     1:02 PM   WALDO-7 SCORE   Total Score  0 (minimal anxiety) 7 (mild anxiety)   Total Score 6 0 7       Prostate enlargement  Recheck UA today. He denies any symptoms of weakened stream or urinary frequency. Prostate enlargement was noted on CT in the ER.    Advance Care Planning  Patient does not have a Health Care Directive: Discussed advance care planning with patient; however, patient declined at this time.      3/25/2025   General Health   How would you rate your overall physical health? Good   Feel stress (tense, anxious, or unable to sleep) Not at all         3/25/2025   Nutrition   Three or more servings of calcium each day? Yes   Diet: Regular (no restrictions)   How many servings of fruit and vegetables per day? (!) 0-1   How many sweetened beverages each day? 0-1         3/25/2025   Exercise   Days per week of moderate/strenous exercise 6 days   Average minutes spent exercising at this level 90 min         3/25/2025   Social Factors   Frequency of  gathering with friends or relatives Once a week   Worry food won't last until get money to buy more No   Food not last or not have enough money for food? No   Do you have housing? (Housing is defined as stable permanent housing and does not include staying ouside in a car, in a tent, in an abandoned building, in an overnight shelter, or couch-surfing.) Yes   Are you worried about losing your housing? No   Lack of transportation? No   Unable to get utilities (heat,electricity)? No         3/25/2025   Fall Risk   Fallen 2 or more times in the past year? No    Trouble with walking or balance? No        Proxy-reported          3/25/2025   Dental   Dentist two times every year? Yes             Today's PHQ-2 Score:       1/16/2025    10:54 AM   PHQ-2 ( 1999 [employer])   Q1: Little interest or pleasure in doing things 0   Q2: Feeling down, depressed or hopeless 0   PHQ-2 Score 0    Q1: Little interest or pleasure in doing things Not at all   Q2: Feeling down, depressed or hopeless Not at all   PHQ-2 Score 0       Patient-reported         3/25/2025   Substance Use   Alcohol more than 3/day or more than 7/wk No   Do you use any other substances recreationally? No     Social History     Tobacco Use    Smoking status: Former     Types: Cigarettes    Smokeless tobacco: Never    Tobacco comments:     Quit smoking 2007   Vaping Use    Vaping status: Never Used   Substance Use Topics    Alcohol use: Yes     Comment: rare    Drug use: No           3/25/2025   STI Screening   New sexual partner(s) since last STI/HIV test? No   ASCVD Risk   The 10-year ASCVD risk score (Randa WEBBER, et al., 2019) is: 7.1%    Values used to calculate the score:      Age: 54 years      Sex: Male      Is Non- : No      Diabetic: No      Tobacco smoker: No      Systolic Blood Pressure: 136 mmHg      Is BP treated: Yes      HDL Cholesterol: 36 mg/dL      Total Cholesterol: 177 mg/dL           Reviewed and updated as needed this  visit by Provider   Tobacco  Allergies  Meds  Problems  Med Hx  Surg Hx  Fam Hx            Past Medical History:   Diagnosis Date    High triglycerides     HSV-1 infection 3/13/2018    DARLING (nonalcoholic steatohepatitis)     12/10     DANIELLE on CPAP     Palpitations     Paroxysmal ventricular tachycardia (H)     Strabismus     Thyroid nodule 02/2012    biopsy thyroglossal ductal cyst.  see Endo    Vitamin D deficiency     White coat syndrome without diagnosis of hypertension 11/22/2013    Home readings of 106/65      Past Surgical History:   Procedure Laterality Date    COLONOSCOPY N/A 1/27/2022    Procedure: COLONOSCOPY, WITH POLYPECTOMY using forceps;  Surgeon: Po Melendrez MD;  Location: [facility]    CV HEART CATHETERIZATION WITH POSSIBLE INTERVENTION N/A 12/10/2021    Procedure: Heart Catheterization with Possible Intervention;  Surgeon: uQyen Lomas MD;  Location:  HEART CARDIAC CATH LAB    DAVINCI LAPAROSCOPIC CHOLECYSTECTOMY WITHOUT GRAMS N/A 2/6/2024    Procedure: CHOLECYSTECTOMY, ROBOT-ASSISTED, LAPAROSCOPIC;  Surgeon: Jorge Lowery MD;  Location:  OR    EP ABLATION VT/PVC N/A 12/14/2021    Procedure: EP Ablation VT;  Surgeon: Zhen Saavedra MD;  Location:  HEART CARDIAC CATH LAB    PE TUBES      TONSILLECTOMY & ADENOIDECTOMY      VASECTOMY       Lab work is in process  Labs reviewed in EPIC  BP Readings from Last 3 Encounters:   03/25/25 136/89   01/16/25 139/83   01/13/25 130/73    Wt Readings from Last 3 Encounters:   03/25/25 103.9 kg (229 lb)   01/16/25 99.8 kg (220 lb)   01/13/25 103.4 kg (227 lb 15.3 oz)                  Patient Active Problem List   Diagnosis    DANIELLE on CPAP    Vitamin D deficiency    Left thyroid nodule    Anxiety    Obesity (BMI 30.0-34.9)    Paroxysmal ventricular tachycardia (H)    Hepatic steatosis    TSH elevation    Glaucoma suspect of both eyes    Non-recurrent bilateral inguinal hernia without obstruction or gangrene    Single subsegmental pulmonary  embolism without acute cor pulmonale (H)     Past Surgical History:   Procedure Laterality Date    COLONOSCOPY N/A 2022    Procedure: COLONOSCOPY, WITH POLYPECTOMY using forceps;  Surgeon: Po Melendrez MD;  Location: [facility]    CV HEART CATHETERIZATION WITH POSSIBLE INTERVENTION N/A 12/10/2021    Procedure: Heart Catheterization with Possible Intervention;  Surgeon: Quyen Lomas MD;  Location:  HEART CARDIAC CATH LAB    DAVINCI LAPAROSCOPIC CHOLECYSTECTOMY WITHOUT GRAMS N/A 2024    Procedure: CHOLECYSTECTOMY, ROBOT-ASSISTED, LAPAROSCOPIC;  Surgeon: Jorge Lowery MD;  Location: [facility]    EP ABLATION VT/PVC N/A 2021    Procedure: EP Ablation VT;  Surgeon: Zhen Saavedra MD;  Location:  HEART CARDIAC CATH LAB    PE TUBES      TONSILLECTOMY & ADENOIDECTOMY      VASECTOMY         Social History     Tobacco Use    Smoking status: Former     Types: Cigarettes    Smokeless tobacco: Never    Tobacco comments:     Quit smoking    Substance Use Topics    Alcohol use: Yes     Comment: rare     Family History   Problem Relation Age of Onset    C.A.D. Mother     Cancer Mother         brain tumor    Circulatory Father         sleep apnea,   in sleep 58    Sleep Apnea Father     Gynecology Sister         x2    Colon Cancer No family hx of          Current Outpatient Medications   Medication Sig Dispense Refill    escitalopram (LEXAPRO) 10 MG tablet Take 1 tablet (10 mg) by mouth daily. 90 tablet 2    lisinopril (ZESTRIL) 20 MG tablet Take 1 tablet (20 mg) by mouth daily. 90 tablet 3    LORazepam (ATIVAN) 0.5 MG tablet Take 1-4 tablets (0.5-2 mg) by mouth every 6 hours as needed for anxiety (air travel). 12 tablet 0    Multiple Vitamin (MULTI-VITAMIN) per tablet Take 1 tablet by mouth daily. 90 tablet 3    rivaroxaban ANTICOAGULANT (XARELTO) 20 MG TABS tablet Take 1 tablet (20 mg) by mouth daily (with dinner). 30 tablet 4    sildenafil (VIAGRA) 50 MG tablet Take 1 tab (50 mg) daily as needed 1  "hour prior to sexual activity; reduce to 25 mg if side effects occur, can increase to maximum of 100 mg if tolerating well but incomplete response. Take on empty stomach, avoid grapefruit/grapefruit juice and alcohol. Do not take with nitrate medications. 12 tablet 5    simvastatin (ZOCOR) 20 MG tablet TAKE ONE TABLET BY MOUTH EVERY NIGHT AT BEDTIME 90 tablet 2     No Known Allergies  Recent Labs   Lab Test 03/25/25  1111 01/16/25  1206 01/13/25  0927 04/26/24  0902 01/25/24  1508 01/05/24  0803 01/24/23  0836 11/16/21  0927 11/02/20  1628   A1C 5.2  --   --   --   --  5.2 5.4   < > 5.4   LDL  --  116*  --   --   --  107* 96   < > 96   HDL  --  36*  --   --   --  35* 35*   < > 37*   TRIG  --  123  --   --   --  174* 262*   < > 235*   ALT  --  25 19  --  41 61 68*  68*   < > 84*   CR  --  0.87 0.92  --  0.89 0.90 0.98   < > 0.87   GFRESTIMATED  --  >90 >90  --  >90 >90 >90   < > >90   GFRESTBLACK  --   --   --   --   --   --   --   --  >90   POTASSIUM  --  4.6 4.1  --  4.3 4.3 4.2   < > 4.1   TSH  --   --   --  3.12  --  4.43*  --    < > 2.70    < > = values in this interval not displayed.          Review of Systems  Constitutional, neuro, ENT, endocrine, pulmonary, cardiac, gastrointestinal, genitourinary, musculoskeletal, integument and psychiatric systems are negative, except as otherwise noted.     Objective    Exam  /89 (BP Location: Right arm, Patient Position: Sitting, Cuff Size: Adult Large)   Pulse 93   Temp 98.1  F (36.7  C) (Oral)   Resp 13   Ht 1.727 m (5' 8\")   Wt 103.9 kg (229 lb)   SpO2 100%   BMI 34.82 kg/m     Estimated body mass index is 34.82 kg/m  as calculated from the following:    Height as of this encounter: 1.727 m (5' 8\").    Weight as of this encounter: 103.9 kg (229 lb).    Physical Exam  GENERAL: alert and no distress  EYES: Eyes grossly normal to inspection, PERRL and conjunctivae and sclerae normal  HENT: ear canals and TM's normal, nose and mouth without ulcers or " lesions  NECK: no adenopathy, no asymmetry, masses, or scars  RESP: lungs clear to auscultation - no rales, rhonchi or wheezes  CV: regular rate and rhythm, normal S1 S2, no S3 or S4, no murmur, click or rub, no peripheral edema  ABDOMEN: soft, nontender, no hepatosplenomegaly, no masses and bowel sounds normal  MS: no gross musculoskeletal defects noted, no edema  NEURO: Normal strength and tone, mentation intact and speech normal  PSYCH: mentation appears normal, affect normal/bright        Signed Electronically by: Padmini Thompson PA-C

## 2025-03-26 LAB — PSA SERPL DL<=0.01 NG/ML-MCNC: 0.86 NG/ML (ref 0–3.5)

## 2025-03-30 LAB — TESTOST SERPL-MCNC: 264 NG/DL (ref 240–950)

## 2025-04-03 DIAGNOSIS — R79.89 LOW SERUM TESTOSTERONE LEVEL: Primary | ICD-10-CM

## 2025-04-07 ENCOUNTER — OFFICE VISIT (OUTPATIENT)
Dept: HEMATOLOGY | Facility: CLINIC | Age: 55
End: 2025-04-07
Attending: PHYSICIAN ASSISTANT
Payer: COMMERCIAL

## 2025-04-07 VITALS
HEART RATE: 90 BPM | HEIGHT: 68 IN | DIASTOLIC BLOOD PRESSURE: 92 MMHG | SYSTOLIC BLOOD PRESSURE: 138 MMHG | OXYGEN SATURATION: 100 % | TEMPERATURE: 98.4 F | BODY MASS INDEX: 34.71 KG/M2 | WEIGHT: 229 LBS

## 2025-04-07 DIAGNOSIS — I26.93 SINGLE SUBSEGMENTAL PULMONARY EMBOLISM WITHOUT ACUTE COR PULMONALE (H): ICD-10-CM

## 2025-04-07 PROCEDURE — 99213 OFFICE O/P EST LOW 20 MIN: CPT | Performed by: PHYSICIAN ASSISTANT

## 2025-04-07 PROCEDURE — 99204 OFFICE O/P NEW MOD 45 MIN: CPT | Performed by: PHYSICIAN ASSISTANT

## 2025-04-07 PROCEDURE — 3080F DIAST BP >= 90 MM HG: CPT | Performed by: PHYSICIAN ASSISTANT

## 2025-04-07 PROCEDURE — 3075F SYST BP GE 130 - 139MM HG: CPT | Performed by: PHYSICIAN ASSISTANT

## 2025-04-07 NOTE — LETTER
[facility]  [address] [place] [id number]  Main: [phone], Fax: [phone]    Patient seen at: [facility] at [address] [place] [address]    Outpatient Visit Note:    Patient: Neptali Ospina  MRN: [id number]  : 1970  GUDELIA: 2025  Location of this writer at the time of this clinic visit was conducted: [facility], [facility].  Location of the patient at the time of this clinic visit was conducted: [facility].     Reason for visit:  Pulmonary embolism found on 2025.     HPI:  Neptali is a 54 year old male with a history of nonalcoholic steatohepatitis, obstructive sleep apnea, anxiety, obesity on Zepbound, paroxysmal ventricular tachycardia, and hyperlipidemia, who is found to have pulmonary embolism on 2025 one day after he returned from [place], referred by Rebekah Gage PA-C of [facility] for consultation.     On 1/3/2025, he and his family flew from [place] to [place] via [place]. [place] to [place] was about 4 hours flight and [place] to [place] was about 6 hours flight. During the last few days of his [place] trip, he noticed some right sided back pain that he thought is related to pneumonia as his symptoms were similar to his experience with pneumonia. He recalls that he still did all the activities with his family.     On 2025, he returned to [place] from [place] via [place], then [place] and then to [place]. Flight from [place] to [place] was 6 hours, from [place] to [place] was 2 hours and from [place] to [place] was 2 hours.     He reportedly started to have right sided chest pain that radiates to his lower right back along with some fever for about 2 days prior to his presentation to the [facility] Emergency Department on  "1/13/2025 for evaluation. A D-Dimer was done, which was elevated at 2.98. Thus a CTA chest was done showing \"1.  Segmental pulmonary embolism is seen in the upper lobe branch of the left pulmonary artery. No evidence of right-sided heart strain. 2.  Small right pleural effusion. Multiple bandlike consolidative opacities are seen in the lower lungs, right greater than left. A few patchy groundglass opacities are also seen in the right lower lobe. Findings likely due to subsegmental atelectasis but superimposed pneumonia cannot be excluded. Suggest clinical correlation. 3.  5 mm hyperdense nodule is noted near the surgical bed in close proximity to the suspected remnant cystic duct. Findings could reflect a prominent vessel but residual stone cannot be excluded. Suggest further characterization with right upper quadrant ultrasound. 4.  Mild, diffuse wall thickening urinary bladder, likely due to sequela of outlet obstruction given prostatomegaly.\" An abdominal ultrasound was done also which showed diffuse hepatic steatosis as well as post surgical changes after cholecystectomy. Influenza, RSV, and COVID-19 tests were all negative. He was placed on rivaroxaban and was discharged home. He was also given a course of antibiotics for presumed community acquired pneumonia.     1/16/2025, he was seen by Rebekah Gage PA-C for follow up and was referred to our clinic for consultation.     Currently, he is on rivaroxaban at 20 mg PO Qday dosing. He reports that his fever and back/chest pain resolved in 4-5 days after he was placed on antibiotics and rivaroxaban. Currently he denies any chest pain or fever. He has no complaint of shortness of breath. Throughout this time, he denies noticing any lower extremity swelling or pain.     He is planning to go to [place] from 6/12/2025 to 7/1/2025 with one of his daughters.     ROS:  Denies any bleeding complications. Specifically, no frequent epistaxis. No issues with oral mucosal " bleeding. Denies any hematuria or blood in stools. Denies any shortness of breath. No chest pain. No cough. No fever.    Medications:  Current Outpatient Medications   Medication Sig Dispense Refill     escitalopram (LEXAPRO) 10 MG tablet Take 1 tablet (10 mg) by mouth daily. 90 tablet 0     HYDROcodone-acetaminophen (NORCO) 5-325 MG tablet Take 1 tablet by mouth every 6 hours as needed for severe pain. 7 tablet 0     lisinopril (ZESTRIL) 20 MG tablet Take 1 tablet (20 mg) by mouth daily. 90 tablet 3     LORazepam (ATIVAN) 0.5 MG tablet Take 1-4 tablets (0.5-2 mg) by mouth every 6 hours as needed for anxiety (air travel). 12 tablet 0     Multiple Vitamin (MULTI-VITAMIN) per tablet Take 1 tablet by mouth daily. 90 tablet 3     rivaroxaban ANTICOAGULANT (XARELTO) 20 MG TABS tablet Take 1 tablet (20 mg) by mouth daily (with dinner). 30 tablet 4     sildenafil (VIAGRA) 50 MG tablet Take 1 tab (50 mg) daily as needed 1 hour prior to sexual activity; reduce to 25 mg if side effects occur, can increase to maximum of 100 mg if tolerating well but incomplete response. Take on empty stomach, avoid grapefruit/grapefruit juice and alcohol. Do not take with nitrate medications. 12 tablet 5     simvastatin (ZOCOR) 20 MG tablet TAKE ONE TABLET BY MOUTH EVERY NIGHT AT BEDTIME 90 tablet 0     No current facility-administered medications for this visit.     Allergies:   No Known Allergies    PmHx:  Past Medical History:   Diagnosis Date     High triglycerides      HSV-1 infection 3/13/2018     DARLING (nonalcoholic steatohepatitis)     12/10      DANIELLE on CPAP      Palpitations      Paroxysmal ventricular tachycardia (H)      Strabismus      Thyroid nodule 02/2012    biopsy thyroglossal ductal cyst.  see Endo     Vitamin D deficiency      White coat syndrome without diagnosis of hypertension 11/22/2013    Home readings of 106/65        Social History:   He is  with 4 children.     Family History:  He denies any family history of  "DVT/PE.  He has a total of 4 sisters, all without any history of venous thromboembolism.  Parents with no history of venous thrombosis.   He has a total of 4 children age from 23, 22, 18 and 17. All without any history of venous thromboembolism.     Objective:  Vitals: BP (!) 138/92 (BP Location: Left arm, Patient Position: Sitting)   Pulse 90   Temp 98.4  F (36.9  C) (Tympanic)   Ht 1.727 m (5' 7.99\")   Wt 103.9 kg (229 lb)   SpO2 100%   BMI 34.83 kg/m    Exam:   He has no obvious swelling or discoloration of his lower extremities.     Labs:  Component      Latest Ref Rn 1/16/2025  12:06 PM   WBC      4.0 - 11.0 10e3/uL 8.6    RBC Count      4.40 - 5.90 10e6/uL 5.07    Hemoglobin      13.3 - 17.7 g/dL 14.9    Hematocrit      40.0 - 53.0 % 44.7    MCV      78 - 100 fL 88    MCH      26.5 - 33.0 pg 29.4    MCHC      31.5 - 36.5 g/dL 33.3    RDW      10.0 - 15.0 % 12.1    Platelet Count      150 - 450 10e3/uL 281    % Neutrophils      % 76    % Lymphocytes      % 15    % Monocytes      % 7    % Eosinophils      % 1    % Basophils      % 0    % Immature Granulocytes      % 1    Absolute Neutrophils      1.6 - 8.3 10e3/uL 6.5    Absolute Lymphocytes      0.8 - 5.3 10e3/uL 1.3    Absolute Monocytes      0.0 - 1.3 10e3/uL 0.6    Absolute Eosinophils      0.0 - 0.7 10e3/uL 0.1    Absolute Basophils      0.0 - 0.2 10e3/uL 0.0    Absolute Immature Granulocytes      <=0.4 10e3/uL 0.1    Sodium      135 - 145 mmol/L 141    Potassium      3.4 - 5.3 mmol/L 4.6    Carbon Dioxide (CO2)      22 - 29 mmol/L 25    Anion Gap      7 - 15 mmol/L 14    Urea Nitrogen      6.0 - 20.0 mg/dL 15.3    Creatinine      0.67 - 1.17 mg/dL 0.87    GFR Estimate      >60 mL/min/1.73m2 >90    Calcium      8.8 - 10.4 mg/dL 9.6    Chloride      98 - 107 mmol/L 102    Glucose      70 - 99 mg/dL 100 (H)    Alkaline Phosphatase      40 - 150 U/L 62    AST      0 - 45 U/L 27    ALT      0 - 70 U/L 25    Protein Total      6.4 - 8.3 g/dL 8.0  "   Albumin      3.5 - 5.2 g/dL 4.2    Bilirubin Total      <=1.2 mg/dL 0.4    Patient Fasting? Unknown       Imaging:  Reviewed and are as described above.     Assessment:  In summary, Neptali is a 54 year old male with a history of nonalcoholic steatohepatitis, obstructive sleep apnea, anxiety, obesity on Zepbound, paroxysmal ventricular tachycardia, and hyperlipidemia, who is found to have pulmonary embolism on 1/13/2025 one day after he returned from [place], referred by Rebekah Gage PA-C of [facility] for consultation.     Neptali's pulmonary embolic event found on 1/13/2025 was likely provoked by his long distance flight to [place] on 1/3/2025. Furthermore, he did have evidence of pneumonia and a fever with imaging study found on 1/13/2025, which could be also a provoking factor leading up to his pulmonary embolic event.     Diagnosis:  Provoked pulmonary embolism found on 1/13/2025. Multifactorial from long distance flight and pneumonia.     Plan:  Today, I spent quite a bit of time to educate Neptali on DVT/PE, provoked vs unprovoked venous thromboembolic event, discuss the differences between venous and arterial thrombosis, and discuss the general approach in regard to anticoagulation therapy management and duration. I also answered all his questions to his satisfaction.     I explain to him that his pulmonary embolic event was likely a provoked event as explained above. I explain that in accordance to current American Society of Hematology (BILLY) guidelines, 3-6 months of anticoagulation therapy is recommended. As mentioned above, he is planning a trip to [facility] in June 2025 and his wife would like him to stay on anticoagulation therapy until after that trip, which I have no opposition of. Thus I will have him stay on anticoagulation therapy with rivaroxaban at 20 mg PO Qday throught 7/13/2025 and then discontinue thereafter.     We discussed prevention strategies today. I explain that he  should be placed on episodic pharmacological DVT/PE prophylaxis at times of increase risk of venous thrombosis. This can simply be done by having him take a dose of rivaroxaban at 10 mg PO Q 24 hours as needed just 30-60 minutes prior to his long distance flight or car travelling for > 4 hours. I explain that his primary care provider can provide this as needed prescription for him in the future.     We also briefly discuss about thrombophilia workup. I explain to him that I do not feel that a thrombophilia workup is entirely necessary as his pulmonary embolic event was a provoked event and he has no family history of DVT/PE. However, I am not entirely oppose to have this done either if the patient insists. After our discussion, the patient is comfortable not to pursue testing at this time.     The patient is provided with our clinic's contact information and is instructed to call if he should have any further questions or concerns.     At this time, I have no further plans to see him back on a routine basis.     Thank you for letting us to participates in this patient's care.         Tevin Valle PA-C, MPAS  Physician Assistant  [facility].     45 minutes spent by me on the date of the encounter doing chart review, history and exam, documentation and further activities per the note    Time IN: 12:57  Time OUT: 13:35

## 2025-05-19 ENCOUNTER — MYC MEDICAL ADVICE (OUTPATIENT)
Dept: FAMILY MEDICINE | Facility: CLINIC | Age: 55
End: 2025-05-19
Payer: COMMERCIAL

## 2025-05-19 DIAGNOSIS — E66.811 OBESITY (BMI 30.0-34.9): ICD-10-CM

## 2025-05-19 DIAGNOSIS — K76.0 HEPATIC STEATOSIS: ICD-10-CM

## 2025-05-19 DIAGNOSIS — G47.33 OSA ON CPAP: ICD-10-CM

## 2025-05-20 DIAGNOSIS — E66.811 OBESITY (BMI 30.0-34.9): ICD-10-CM

## 2025-05-20 DIAGNOSIS — G47.33 OSA ON CPAP: ICD-10-CM

## 2025-05-20 DIAGNOSIS — K76.0 HEPATIC STEATOSIS: ICD-10-CM

## 2025-05-20 NOTE — TELEPHONE ENCOUNTER
Routing to Padmini. Pt wanting to be prescribed highest dose of Zepbound and split it up due to cost. See MCM and advise.     Mine Mckenzie RN   [facility]

## 2025-05-21 RX ORDER — TIRZEPATIDE 2.5 MG/.5ML
INJECTION, SOLUTION SUBCUTANEOUS
Qty: 2 ML | Refills: 0 | OUTPATIENT
Start: 2025-05-21

## 2025-05-21 NOTE — TELEPHONE ENCOUNTER
Dose was increased, refusing refill request. Already sent rx for higher dose.    Padmini Thompson PA-C

## 2025-06-06 ENCOUNTER — RESULTS FOLLOW-UP (OUTPATIENT)
Dept: INTERNAL MEDICINE | Facility: CLINIC | Age: 55
End: 2025-06-06

## 2025-07-01 DIAGNOSIS — K76.0 HEPATIC STEATOSIS: ICD-10-CM

## 2025-07-01 DIAGNOSIS — E66.811 OBESITY (BMI 30.0-34.9): ICD-10-CM

## 2025-07-01 DIAGNOSIS — G47.33 OSA ON CPAP: ICD-10-CM

## 2025-07-01 RX ORDER — TIRZEPATIDE 7.5 MG/.5ML
INJECTION, SOLUTION SUBCUTANEOUS
Qty: 2 ML | Refills: 0 | Status: SHIPPED | OUTPATIENT
Start: 2025-07-01

## 2025-08-17 ENCOUNTER — MYC MEDICAL ADVICE (OUTPATIENT)
Dept: FAMILY MEDICINE | Facility: CLINIC | Age: 55
End: 2025-08-17
Payer: COMMERCIAL

## (undated) DEVICE — PREP CHLORAPREP 26ML TINTED HI-LITE ORANGE 930815

## (undated) DEVICE — SOL WATER IRRIG 1000ML BOTTLE 2F7114

## (undated) DEVICE — ENDO TROCAR FIRST ENTRY KII FIOS Z-THRD 05X100MM CTF03

## (undated) DEVICE — CATH EP CATH EP REPRO DAIG RSPN FX CRV DX EP C

## (undated) DEVICE — DAVINCI HOT SHEARS TIP COVER  400180

## (undated) DEVICE — CATH ANGIO JUDKINS JL4 6FRX100CM INFINITI 534620T

## (undated) DEVICE — TOTE ANGIO CORP PC15AT SAN32CC83O

## (undated) DEVICE — LUBRICANT INST ELECTROLUBE EL101

## (undated) DEVICE — POUCH TISSUE RETRIEVAL ROBOTIC 8MM 5.1" INTRO TRS-ROBO-8

## (undated) DEVICE — PACK EP SRG PROC LF DISP SAN32EPFSR

## (undated) DEVICE — SU VICRYL 0 UR-6 27" J603H

## (undated) DEVICE — KIT ENDO TURNOVER/PROCEDURE W/CLEAN A SCOPE LINERS 103888

## (undated) DEVICE — DEFIB PRO-PADZ LVP LQD GEL ADULT 8900-2105-01

## (undated) DEVICE — INTRODUCER SHEATH GREEN 6.5FRX11CM .038IN PSI-6F-11-038ACT

## (undated) DEVICE — DAVINCI XI DRAPE ARM 470015

## (undated) DEVICE — GLOVE BIOGEL PI MICRO INDICATOR UNDERGLOVE SZ 7.5 48975

## (undated) DEVICE — PACK LAP CHOLE SLC15LCFSD

## (undated) DEVICE — INTRO CATH 12CM 8.5FR FST-CATH

## (undated) DEVICE — CATH RF CARD ABL BID JJ THERMO

## (undated) DEVICE — ANTIFOG SOLUTION SEE SHARP 150M TROCAR SWABS 30978

## (undated) DEVICE — INTRODUCER CATH VASC 5FRX10CM  MPIS-501-NT-U-SST

## (undated) DEVICE — KIT HAND CONTROL ANGIOTOUCH ACIST 65CM AT-P65

## (undated) DEVICE — DAVINCI XI CAUTERY HOOK 470183

## (undated) DEVICE — LIGHT HANDLE X2

## (undated) DEVICE — INTRO SHEATH 6FRX10CM PINNACLE RSS602

## (undated) DEVICE — SU MONOCRYL 4-0 PS-2 18" UND Y496G

## (undated) DEVICE — DRSG BANDAID 1X3" FABRIC CURITY LATEX FREE KC44101

## (undated) DEVICE — SYR ANGIO NAMIC CNTRSTINJ ROT ADPT RSVR PALM PD THB GRP FNGR

## (undated) DEVICE — DAVINCI XI DRAPE COLUMN 470341

## (undated) DEVICE — PATCH CARTO 3 EXTERNAL REFERENCE 3D MAPPING CREFP6

## (undated) DEVICE — DRAPE BREAST/CHEST 29420

## (undated) DEVICE — SYR 10ML FINGER CONTROL W/O NDL 309695

## (undated) DEVICE — MANIFOLD KIT ANGIO AUTOMATED 014613

## (undated) DEVICE — DAVINCI XI GRASPER FENESTRATED TIP UP 8MM 470347

## (undated) DEVICE — ESU GROUND PAD UNIVERSAL W/O CORD

## (undated) DEVICE — DAVINCI XI FCP BIPOLAR FENESTRATED 470205

## (undated) DEVICE — DAVINCI XI OBTURATOR BLADELESS 8MM 470359

## (undated) DEVICE — DRSG STERI STRIP 1/2X4" R1547

## (undated) DEVICE — DAVINCI XI MONOPOLAR SCISSORS HOT SHEARS 8MM 470179

## (undated) DEVICE — TUBE SET SMARKABLATE IRRIGATION

## (undated) DEVICE — DRAPE SHEET REV FOLD 3/4 9349

## (undated) DEVICE — LINEN TOWEL PACK X5 5464

## (undated) DEVICE — CATH ANGIO INFINITI 3DRC 6FRX100CM 534676T

## (undated) DEVICE — CLIP ENDO HEMO-LOC GREEN MED/LG 544230

## (undated) DEVICE — CLIP ENDO HEMO-LOC PURPLE LG 544240

## (undated) DEVICE — SMART CAPNOLINE H PLUS, ADULT/INTERMEDIATE O2, LONG

## (undated) DEVICE — DAVINCI XI CLIP APPLIER MED HEM-O-LOK GREEN 470327

## (undated) DEVICE — GLOVE BIOGEL PI MICRO SZ 7.5 48575

## (undated) DEVICE — DECANTER BAG 2002S

## (undated) DEVICE — DAVINCI XI SEAL UNIVERSAL 5-8MM 470361

## (undated) RX ORDER — HYDROMORPHONE HYDROCHLORIDE 1 MG/ML
INJECTION, SOLUTION INTRAMUSCULAR; INTRAVENOUS; SUBCUTANEOUS
Status: DISPENSED
Start: 2024-02-06

## (undated) RX ORDER — FENTANYL CITRATE 50 UG/ML
INJECTION, SOLUTION INTRAMUSCULAR; INTRAVENOUS
Status: DISPENSED
Start: 2021-12-10

## (undated) RX ORDER — HEPARIN SODIUM 1000 [USP'U]/ML
INJECTION, SOLUTION INTRAVENOUS; SUBCUTANEOUS
Status: DISPENSED
Start: 2021-12-10

## (undated) RX ORDER — FENTANYL CITRATE 50 UG/ML
INJECTION, SOLUTION INTRAMUSCULAR; INTRAVENOUS
Status: DISPENSED
Start: 2021-12-14

## (undated) RX ORDER — ONDANSETRON 2 MG/ML
INJECTION INTRAMUSCULAR; INTRAVENOUS
Status: DISPENSED
Start: 2024-02-06

## (undated) RX ORDER — BUPIVACAINE HYDROCHLORIDE 2.5 MG/ML
INJECTION, SOLUTION EPIDURAL; INFILTRATION; INTRACAUDAL
Status: DISPENSED
Start: 2024-02-06

## (undated) RX ORDER — GLYCOPYRROLATE 0.2 MG/ML
INJECTION, SOLUTION INTRAMUSCULAR; INTRAVENOUS
Status: DISPENSED
Start: 2024-02-06

## (undated) RX ORDER — PROPOFOL 10 MG/ML
INJECTION, EMULSION INTRAVENOUS
Status: DISPENSED
Start: 2024-02-06

## (undated) RX ORDER — FENTANYL CITRATE 50 UG/ML
INJECTION, SOLUTION INTRAMUSCULAR; INTRAVENOUS
Status: DISPENSED
Start: 2024-02-06

## (undated) RX ORDER — DEXAMETHASONE SODIUM PHOSPHATE 4 MG/ML
INJECTION, SOLUTION INTRA-ARTICULAR; INTRALESIONAL; INTRAMUSCULAR; INTRAVENOUS; SOFT TISSUE
Status: DISPENSED
Start: 2024-02-06

## (undated) RX ORDER — HEPARIN SODIUM 200 [USP'U]/100ML
INJECTION, SOLUTION INTRAVENOUS
Status: DISPENSED
Start: 2021-12-10

## (undated) RX ORDER — LIDOCAINE HYDROCHLORIDE 10 MG/ML
INJECTION, SOLUTION EPIDURAL; INFILTRATION; INTRACAUDAL; PERINEURAL
Status: DISPENSED
Start: 2021-12-10

## (undated) RX ORDER — LIDOCAINE HYDROCHLORIDE 10 MG/ML
INJECTION, SOLUTION EPIDURAL; INFILTRATION; INTRACAUDAL; PERINEURAL
Status: DISPENSED
Start: 2021-12-14

## (undated) RX ORDER — HEPARIN SODIUM 1000 [USP'U]/ML
INJECTION, SOLUTION INTRAVENOUS; SUBCUTANEOUS
Status: DISPENSED
Start: 2021-12-14